# Patient Record
Sex: MALE | Race: WHITE | NOT HISPANIC OR LATINO | Employment: FULL TIME | URBAN - METROPOLITAN AREA
[De-identification: names, ages, dates, MRNs, and addresses within clinical notes are randomized per-mention and may not be internally consistent; named-entity substitution may affect disease eponyms.]

---

## 2017-11-28 ENCOUNTER — HOSPITAL ENCOUNTER (INPATIENT)
Facility: MEDICAL CENTER | Age: 37
LOS: 3 days | DRG: 815 | End: 2017-12-01
Attending: EMERGENCY MEDICINE | Admitting: SURGERY
Payer: COMMERCIAL

## 2017-11-28 ENCOUNTER — RESOLUTE PROFESSIONAL BILLING HOSPITAL PROF FEE (OUTPATIENT)
Dept: HOSPITALIST | Facility: MEDICAL CENTER | Age: 37
End: 2017-11-28
Payer: COMMERCIAL

## 2017-11-28 ENCOUNTER — APPOINTMENT (OUTPATIENT)
Dept: RADIOLOGY | Facility: MEDICAL CENTER | Age: 37
DRG: 815 | End: 2017-11-28
Attending: EMERGENCY MEDICINE
Payer: COMMERCIAL

## 2017-11-28 DIAGNOSIS — D56.3 THALASSEMIA MINOR: ICD-10-CM

## 2017-11-28 DIAGNOSIS — S36.039A LACERATION OF SPLEEN, INITIAL ENCOUNTER: ICD-10-CM

## 2017-11-28 PROBLEM — T14.90XA TRAUMA: Status: ACTIVE | Noted: 2017-11-28

## 2017-11-28 PROBLEM — Z86.2 H/O THALASSEMIA MINOR: Status: ACTIVE | Noted: 2017-11-28

## 2017-11-28 PROBLEM — E87.6 HYPOKALEMIA: Status: ACTIVE | Noted: 2017-11-28

## 2017-11-28 PROBLEM — Z53.09 CONTRAINDICATION TO DEEP VEIN THROMBOSIS (DVT) PROPHYLAXIS: Status: ACTIVE | Noted: 2017-11-28

## 2017-11-28 PROBLEM — R74.8 ABNORMAL LIVER ENZYMES: Status: ACTIVE | Noted: 2017-11-28

## 2017-11-28 LAB
ALBUMIN SERPL BCP-MCNC: 4.4 G/DL (ref 3.2–4.9)
ALBUMIN/GLOB SERPL: 1.6 G/DL
ALP SERPL-CCNC: 53 U/L (ref 30–99)
ALT SERPL-CCNC: 169 U/L (ref 2–50)
ANION GAP SERPL CALC-SCNC: 7 MMOL/L (ref 0–11.9)
APTT PPP: 29.7 SEC (ref 24.7–36)
AST SERPL-CCNC: 135 U/L (ref 12–45)
BASOPHILS # BLD AUTO: 0.5 % (ref 0–1.8)
BASOPHILS # BLD: 0.04 K/UL (ref 0–0.12)
BILIRUB SERPL-MCNC: 3.4 MG/DL (ref 0.1–1.5)
BUN SERPL-MCNC: 14 MG/DL (ref 8–22)
CALCIUM SERPL-MCNC: 9.1 MG/DL (ref 8.5–10.5)
CHLORIDE SERPL-SCNC: 99 MMOL/L (ref 96–112)
CO2 SERPL-SCNC: 27 MMOL/L (ref 20–33)
CREAT SERPL-MCNC: 0.82 MG/DL (ref 0.5–1.4)
EKG IMPRESSION: NORMAL
EOSINOPHIL # BLD AUTO: 0.09 K/UL (ref 0–0.51)
EOSINOPHIL NFR BLD: 1.1 % (ref 0–6.9)
ERYTHROCYTE [DISTWIDTH] IN BLOOD BY AUTOMATED COUNT: 44.4 FL (ref 35.9–50)
GFR SERPL CREATININE-BSD FRML MDRD: >60 ML/MIN/1.73 M 2
GLOBULIN SER CALC-MCNC: 2.7 G/DL (ref 1.9–3.5)
GLUCOSE SERPL-MCNC: 102 MG/DL (ref 65–99)
HCT VFR BLD AUTO: 33.5 % (ref 42–52)
HGB BLD-MCNC: 10.5 G/DL (ref 14–18)
IMM GRANULOCYTES # BLD AUTO: 0.08 K/UL (ref 0–0.11)
IMM GRANULOCYTES NFR BLD AUTO: 1 % (ref 0–0.9)
INR PPP: 1.15 (ref 0.87–1.13)
LYMPHOCYTES # BLD AUTO: 1.78 K/UL (ref 1–4.8)
LYMPHOCYTES NFR BLD: 21.3 % (ref 22–41)
MCH RBC QN AUTO: 22.8 PG (ref 27–33)
MCHC RBC AUTO-ENTMCNC: 31.3 G/DL (ref 33.7–35.3)
MCV RBC AUTO: 72.7 FL (ref 81.4–97.8)
MONOCYTES # BLD AUTO: 0.81 K/UL (ref 0–0.85)
MONOCYTES NFR BLD AUTO: 9.7 % (ref 0–13.4)
NEUTROPHILS # BLD AUTO: 5.54 K/UL (ref 1.82–7.42)
NEUTROPHILS NFR BLD: 66.4 % (ref 44–72)
NRBC # BLD AUTO: 0.08 K/UL
NRBC BLD AUTO-RTO: 1 /100 WBC
PLATELET # BLD AUTO: 181 K/UL (ref 164–446)
POTASSIUM SERPL-SCNC: 3.5 MMOL/L (ref 3.6–5.5)
PROT SERPL-MCNC: 7.1 G/DL (ref 6–8.2)
PROTHROMBIN TIME: 14.4 SEC (ref 12–14.6)
RBC # BLD AUTO: 4.61 M/UL (ref 4.7–6.1)
SODIUM SERPL-SCNC: 133 MMOL/L (ref 135–145)
WBC # BLD AUTO: 8.3 K/UL (ref 4.8–10.8)

## 2017-11-28 PROCEDURE — 85025 COMPLETE CBC W/AUTO DIFF WBC: CPT

## 2017-11-28 PROCEDURE — 700102 HCHG RX REV CODE 250 W/ 637 OVERRIDE(OP): Performed by: NURSE PRACTITIONER

## 2017-11-28 PROCEDURE — 93005 ELECTROCARDIOGRAM TRACING: CPT

## 2017-11-28 PROCEDURE — 85610 PROTHROMBIN TIME: CPT

## 2017-11-28 PROCEDURE — 85730 THROMBOPLASTIN TIME PARTIAL: CPT

## 2017-11-28 PROCEDURE — 700111 HCHG RX REV CODE 636 W/ 250 OVERRIDE (IP): Performed by: EMERGENCY MEDICINE

## 2017-11-28 PROCEDURE — 700101 HCHG RX REV CODE 250: Performed by: NURSE PRACTITIONER

## 2017-11-28 PROCEDURE — 71275 CT ANGIOGRAPHY CHEST: CPT

## 2017-11-28 PROCEDURE — 700105 HCHG RX REV CODE 258: Performed by: NURSE PRACTITIONER

## 2017-11-28 PROCEDURE — 770022 HCHG ROOM/CARE - ICU (200)

## 2017-11-28 PROCEDURE — 93005 ELECTROCARDIOGRAM TRACING: CPT | Performed by: EMERGENCY MEDICINE

## 2017-11-28 PROCEDURE — 74177 CT ABD & PELVIS W/CONTRAST: CPT

## 2017-11-28 PROCEDURE — 99291 CRITICAL CARE FIRST HOUR: CPT

## 2017-11-28 PROCEDURE — 80053 COMPREHEN METABOLIC PANEL: CPT

## 2017-11-28 PROCEDURE — 700117 HCHG RX CONTRAST REV CODE 255: Performed by: EMERGENCY MEDICINE

## 2017-11-28 PROCEDURE — A9270 NON-COVERED ITEM OR SERVICE: HCPCS | Performed by: NURSE PRACTITIONER

## 2017-11-28 PROCEDURE — 36415 COLL VENOUS BLD VENIPUNCTURE: CPT

## 2017-11-28 RX ORDER — BACITRACIN ZINC AND POLYMYXIN B SULFATE 500; 1000 [USP'U]/G; [USP'U]/G
OINTMENT TOPICAL 3 TIMES DAILY
Status: DISCONTINUED | OUTPATIENT
Start: 2017-11-28 | End: 2017-12-01 | Stop reason: HOSPADM

## 2017-11-28 RX ORDER — BISACODYL 10 MG
10 SUPPOSITORY, RECTAL RECTAL
Status: DISCONTINUED | OUTPATIENT
Start: 2017-11-28 | End: 2017-12-01 | Stop reason: HOSPADM

## 2017-11-28 RX ORDER — ONDANSETRON 4 MG/1
4 TABLET, ORALLY DISINTEGRATING ORAL ONCE
Status: DISCONTINUED | OUTPATIENT
Start: 2017-11-28 | End: 2017-11-28

## 2017-11-28 RX ORDER — OXYCODONE HYDROCHLORIDE 10 MG/1
10 TABLET ORAL
Status: DISCONTINUED | OUTPATIENT
Start: 2017-11-28 | End: 2017-12-01 | Stop reason: HOSPADM

## 2017-11-28 RX ORDER — POLYETHYLENE GLYCOL 3350 17 G/17G
1 POWDER, FOR SOLUTION ORAL 2 TIMES DAILY
Status: DISCONTINUED | OUTPATIENT
Start: 2017-11-28 | End: 2017-12-01 | Stop reason: HOSPADM

## 2017-11-28 RX ORDER — CHLORHEXIDINE GLUCONATE ORAL RINSE 1.2 MG/ML
15 SOLUTION DENTAL EVERY 12 HOURS
Status: DISCONTINUED | OUTPATIENT
Start: 2017-11-28 | End: 2017-11-29

## 2017-11-28 RX ORDER — FELODIPINE 5 MG/1
5 TABLET, EXTENDED RELEASE ORAL DAILY
COMMUNITY
End: 2019-03-23

## 2017-11-28 RX ORDER — FAMOTIDINE 20 MG/1
20 TABLET, FILM COATED ORAL 2 TIMES DAILY
Status: DISCONTINUED | OUTPATIENT
Start: 2017-11-28 | End: 2017-11-30

## 2017-11-28 RX ORDER — ONDANSETRON 2 MG/ML
4 INJECTION INTRAMUSCULAR; INTRAVENOUS EVERY 4 HOURS PRN
Status: DISCONTINUED | OUTPATIENT
Start: 2017-11-28 | End: 2017-12-01 | Stop reason: HOSPADM

## 2017-11-28 RX ORDER — ENEMA 19; 7 G/133ML; G/133ML
1 ENEMA RECTAL
Status: DISCONTINUED | OUTPATIENT
Start: 2017-11-28 | End: 2017-12-01 | Stop reason: HOSPADM

## 2017-11-28 RX ORDER — MORPHINE SULFATE 4 MG/ML
2 INJECTION, SOLUTION INTRAMUSCULAR; INTRAVENOUS
Status: DISCONTINUED | OUTPATIENT
Start: 2017-11-28 | End: 2017-12-01 | Stop reason: HOSPADM

## 2017-11-28 RX ORDER — MORPHINE SULFATE 4 MG/ML
4 INJECTION, SOLUTION INTRAMUSCULAR; INTRAVENOUS ONCE
Status: COMPLETED | OUTPATIENT
Start: 2017-11-28 | End: 2017-11-28

## 2017-11-28 RX ORDER — AMOXICILLIN 250 MG
1 CAPSULE ORAL
Status: DISCONTINUED | OUTPATIENT
Start: 2017-11-28 | End: 2017-12-01 | Stop reason: HOSPADM

## 2017-11-28 RX ORDER — OXYCODONE HYDROCHLORIDE 5 MG/1
5 TABLET ORAL
Status: DISCONTINUED | OUTPATIENT
Start: 2017-11-28 | End: 2017-12-01 | Stop reason: HOSPADM

## 2017-11-28 RX ORDER — DOCUSATE SODIUM 100 MG/1
100 CAPSULE, LIQUID FILLED ORAL 2 TIMES DAILY
Status: DISCONTINUED | OUTPATIENT
Start: 2017-11-28 | End: 2017-12-01 | Stop reason: HOSPADM

## 2017-11-28 RX ORDER — SODIUM CHLORIDE, SODIUM LACTATE, POTASSIUM CHLORIDE, CALCIUM CHLORIDE 600; 310; 30; 20 MG/100ML; MG/100ML; MG/100ML; MG/100ML
INJECTION, SOLUTION INTRAVENOUS CONTINUOUS
Status: DISCONTINUED | OUTPATIENT
Start: 2017-11-28 | End: 2017-11-29

## 2017-11-28 RX ORDER — ACETAMINOPHEN 500 MG
1000 TABLET ORAL EVERY 6 HOURS
Status: DISCONTINUED | OUTPATIENT
Start: 2017-11-29 | End: 2017-12-01 | Stop reason: HOSPADM

## 2017-11-28 RX ORDER — AMOXICILLIN 250 MG
1 CAPSULE ORAL NIGHTLY
Status: DISCONTINUED | OUTPATIENT
Start: 2017-11-28 | End: 2017-12-01 | Stop reason: HOSPADM

## 2017-11-28 RX ORDER — LOSARTAN POTASSIUM 100 MG/1
100 TABLET ORAL DAILY
COMMUNITY
End: 2019-03-23

## 2017-11-28 RX ORDER — POTASSIUM CHLORIDE 20 MEQ/1
20 TABLET, EXTENDED RELEASE ORAL DAILY
Status: COMPLETED | OUTPATIENT
Start: 2017-11-29 | End: 2017-11-30

## 2017-11-28 RX ADMIN — ACETAMINOPHEN 1000 MG: 500 TABLET ORAL at 23:39

## 2017-11-28 RX ADMIN — MORPHINE SULFATE 4 MG: 4 INJECTION INTRAVENOUS at 23:40

## 2017-11-28 RX ADMIN — BACITRACIN ZINC AND POLYMYXIN B SULFATE 1 EACH: 500; 10000 OINTMENT TOPICAL at 23:39

## 2017-11-28 RX ADMIN — OXYCODONE HYDROCHLORIDE 5 MG: 5 TABLET ORAL at 22:12

## 2017-11-28 RX ADMIN — IOHEXOL 100 ML: 350 INJECTION, SOLUTION INTRAVENOUS at 20:41

## 2017-11-28 RX ADMIN — SODIUM CHLORIDE, POTASSIUM CHLORIDE, SODIUM LACTATE AND CALCIUM CHLORIDE: 600; 310; 30; 20 INJECTION, SOLUTION INTRAVENOUS at 22:15

## 2017-11-28 RX ADMIN — FAMOTIDINE 20 MG: 20 TABLET, FILM COATED ORAL at 23:39

## 2017-11-28 ASSESSMENT — PAIN SCALES - GENERAL
PAINLEVEL_OUTOF10: 3
PAINLEVEL_OUTOF10: 4
PAINLEVEL_OUTOF10: 7

## 2017-11-28 ASSESSMENT — LIFESTYLE VARIABLES
HOW MANY TIMES IN THE PAST YEAR HAVE YOU HAD 5 OR MORE DRINKS IN A DAY: 1
EVER HAD A DRINK FIRST THING IN THE MORNING TO STEADY YOUR NERVES TO GET RID OF A HANGOVER: NO
AVERAGE NUMBER OF DAYS PER WEEK YOU HAVE A DRINK CONTAINING ALCOHOL: 12
TOTAL SCORE: 1
CONSUMPTION TOTAL: POSITIVE
TOTAL SCORE: 1
HAVE YOU EVER FELT YOU SHOULD CUT DOWN ON YOUR DRINKING: YES
ALCOHOL_USE: YES
EVER FELT BAD OR GUILTY ABOUT YOUR DRINKING: NO
ON A TYPICAL DAY WHEN YOU DRINK ALCOHOL HOW MANY DRINKS DO YOU HAVE: 2
TOTAL SCORE: 1
DO YOU DRINK ALCOHOL: NO
HAVE PEOPLE ANNOYED YOU BY CRITICIZING YOUR DRINKING: NO
EVER_SMOKED: YES

## 2017-11-28 ASSESSMENT — ENCOUNTER SYMPTOMS
NAUSEA: 1
VOMITING: 0

## 2017-11-28 ASSESSMENT — PATIENT HEALTH QUESTIONNAIRE - PHQ9
1. LITTLE INTEREST OR PLEASURE IN DOING THINGS: NOT AT ALL
SUM OF ALL RESPONSES TO PHQ QUESTIONS 1-9: 0
SUM OF ALL RESPONSES TO PHQ9 QUESTIONS 1 AND 2: 0

## 2017-11-28 NOTE — LETTER
[facility]   [address], [place] [address]  Phone: Dept: [phone] - Fax:        [facility] Progress Report and Disability Certification  Date of Service: 11/28/2017   No Show:  No  Date / Time of Next Visit:     Claim Information   Patient Name: River Olson  Claim Number:     Employer: [employer] Date of Injury: 11/21/2017     Insurer / TPA: [employer] ID / SSN:    Occupation:  Diagnosis: Diagnoses of Laceration of spleen, initial encounter and Thalassemia minor were pertinent to this visit.    Medical Information   Related to Industrial Injury? Yes    Subjective Complaints:  Patient presents complaining of left lower chest pain, upper abdominal pain from a fall on Tuesday off of a train. He stated the pain gets significantly worse on Saturday. Patient went to an occupational care where they're concerned about a pulmonary embolism sent the patient to the emergency department for evaluation.   Objective Findings: Patient is tender about the left upper quadrant region lower chest area.   Pre-Existing Condition(s): None   Assessment:   Initial Visit    Status: Additional Care Required  Permanent Disability:No    Plan:   Comments:patient will be admitted to the ICU for observation of care and possible treatment is necessary    Diagnostics: CT    Comments:  Patient has a splenic laceration secondary to the fall occurred on Tuesday.    Disability Information   Status:   Comments:unable to determine at this time    From:     Through:   Restrictions are:     Physical Restrictions   Sitting:    Standing:    Stooping:    Bending:      Squatting:    Walking:    Climbing:    Pushing:      Pulling:    Other:    Reaching Above Shoulder (L):   Reaching Above Shoulder (R):       Reaching Below Shoulder (L):    Reaching Below Shoulder (R):      Not to exceed Weight Limits   Carrying(hrs):   Weight Limit(lb):   Lifting(hrs):   Weight  Limit(lb):     Comments:         Repetitive Actions   Hands: i.e. Fine Manipulations from Grasping:     Feet: i.e. Operating Foot Controls:     Driving / Operate Machinery:     Physician Name: Tyson Hernandez Physician Signature: TYSON Nunes M.D. e-Signature:  , Medical Director   Clinic Name / Location: [address] [address]  [phone]     Clinic Phone Number: Dept: [phone]   Appointment Time:  Visit Start Time:    Check-In Time:  5:47 PM Visit Discharge Time:    Original-Treating Physician or Chiropractor    Page 2-Insurer/TPA    Page 3-Employer    Page 4-Employee

## 2017-11-29 PROBLEM — D62 ANEMIA ASSOCIATED WITH ACUTE BLOOD LOSS: Status: ACTIVE | Noted: 2017-11-29

## 2017-11-29 LAB
ALBUMIN SERPL BCP-MCNC: 3.8 G/DL (ref 3.2–4.9)
ALBUMIN/GLOB SERPL: 1.4 G/DL
ALP SERPL-CCNC: 56 U/L (ref 30–99)
ALT SERPL-CCNC: 140 U/L (ref 2–50)
ANION GAP SERPL CALC-SCNC: 7 MMOL/L (ref 0–11.9)
AST SERPL-CCNC: 111 U/L (ref 12–45)
BASOPHILS # BLD AUTO: 0.3 % (ref 0–1.8)
BASOPHILS # BLD AUTO: 0.5 % (ref 0–1.8)
BASOPHILS # BLD AUTO: 0.7 % (ref 0–1.8)
BASOPHILS # BLD AUTO: 0.7 % (ref 0–1.8)
BASOPHILS # BLD: 0.02 K/UL (ref 0–0.12)
BASOPHILS # BLD: 0.03 K/UL (ref 0–0.12)
BASOPHILS # BLD: 0.04 K/UL (ref 0–0.12)
BASOPHILS # BLD: 0.05 K/UL (ref 0–0.12)
BILIRUB SERPL-MCNC: 4.6 MG/DL (ref 0.1–1.5)
BUN SERPL-MCNC: 10 MG/DL (ref 8–22)
CALCIUM SERPL-MCNC: 8.9 MG/DL (ref 8.5–10.5)
CHLORIDE SERPL-SCNC: 103 MMOL/L (ref 96–112)
CO2 SERPL-SCNC: 27 MMOL/L (ref 20–33)
CREAT SERPL-MCNC: 0.63 MG/DL (ref 0.5–1.4)
EOSINOPHIL # BLD AUTO: 0.08 K/UL (ref 0–0.51)
EOSINOPHIL # BLD AUTO: 0.1 K/UL (ref 0–0.51)
EOSINOPHIL # BLD AUTO: 0.11 K/UL (ref 0–0.51)
EOSINOPHIL # BLD AUTO: 0.12 K/UL (ref 0–0.51)
EOSINOPHIL NFR BLD: 1.1 % (ref 0–6.9)
EOSINOPHIL NFR BLD: 1.7 % (ref 0–6.9)
EOSINOPHIL NFR BLD: 1.9 % (ref 0–6.9)
EOSINOPHIL NFR BLD: 2.1 % (ref 0–6.9)
ERYTHROCYTE [DISTWIDTH] IN BLOOD BY AUTOMATED COUNT: 43.6 FL (ref 35.9–50)
ERYTHROCYTE [DISTWIDTH] IN BLOOD BY AUTOMATED COUNT: 44.7 FL (ref 35.9–50)
ERYTHROCYTE [DISTWIDTH] IN BLOOD BY AUTOMATED COUNT: 45 FL (ref 35.9–50)
ERYTHROCYTE [DISTWIDTH] IN BLOOD BY AUTOMATED COUNT: 45.2 FL (ref 35.9–50)
GFR SERPL CREATININE-BSD FRML MDRD: >60 ML/MIN/1.73 M 2
GLOBULIN SER CALC-MCNC: 2.8 G/DL (ref 1.9–3.5)
GLUCOSE BLD-MCNC: 141 MG/DL (ref 65–99)
GLUCOSE SERPL-MCNC: 101 MG/DL (ref 65–99)
HCT VFR BLD AUTO: 30.3 % (ref 42–52)
HCT VFR BLD AUTO: 30.7 % (ref 42–52)
HCT VFR BLD AUTO: 30.7 % (ref 42–52)
HCT VFR BLD AUTO: 31.8 % (ref 42–52)
HGB BLD-MCNC: 9.5 G/DL (ref 14–18)
HGB BLD-MCNC: 9.6 G/DL (ref 14–18)
HGB BLD-MCNC: 9.6 G/DL (ref 14–18)
HGB BLD-MCNC: 9.9 G/DL (ref 14–18)
IMM GRANULOCYTES # BLD AUTO: 0.04 K/UL (ref 0–0.11)
IMM GRANULOCYTES # BLD AUTO: 0.05 K/UL (ref 0–0.11)
IMM GRANULOCYTES # BLD AUTO: 0.05 K/UL (ref 0–0.11)
IMM GRANULOCYTES # BLD AUTO: 0.07 K/UL (ref 0–0.11)
IMM GRANULOCYTES NFR BLD AUTO: 0.7 % (ref 0–0.9)
IMM GRANULOCYTES NFR BLD AUTO: 0.9 % (ref 0–0.9)
LYMPHOCYTES # BLD AUTO: 1.34 K/UL (ref 1–4.8)
LYMPHOCYTES # BLD AUTO: 1.47 K/UL (ref 1–4.8)
LYMPHOCYTES # BLD AUTO: 1.7 K/UL (ref 1–4.8)
LYMPHOCYTES # BLD AUTO: 1.77 K/UL (ref 1–4.8)
LYMPHOCYTES NFR BLD: 22.9 % (ref 22–41)
LYMPHOCYTES NFR BLD: 23.8 % (ref 22–41)
LYMPHOCYTES NFR BLD: 25.7 % (ref 22–41)
LYMPHOCYTES NFR BLD: 29.5 % (ref 22–41)
MCH RBC QN AUTO: 22.4 PG (ref 27–33)
MCH RBC QN AUTO: 22.7 PG (ref 27–33)
MCH RBC QN AUTO: 22.7 PG (ref 27–33)
MCH RBC QN AUTO: 22.8 PG (ref 27–33)
MCHC RBC AUTO-ENTMCNC: 31.1 G/DL (ref 33.7–35.3)
MCHC RBC AUTO-ENTMCNC: 31.3 G/DL (ref 33.7–35.3)
MCHC RBC AUTO-ENTMCNC: 31.3 G/DL (ref 33.7–35.3)
MCHC RBC AUTO-ENTMCNC: 31.4 G/DL (ref 33.7–35.3)
MCV RBC AUTO: 71.5 FL (ref 81.4–97.8)
MCV RBC AUTO: 72.7 FL (ref 81.4–97.8)
MCV RBC AUTO: 72.8 FL (ref 81.4–97.8)
MCV RBC AUTO: 72.9 FL (ref 81.4–97.8)
MONOCYTES # BLD AUTO: 0.46 K/UL (ref 0–0.85)
MONOCYTES # BLD AUTO: 0.47 K/UL (ref 0–0.85)
MONOCYTES # BLD AUTO: 0.47 K/UL (ref 0–0.85)
MONOCYTES # BLD AUTO: 0.62 K/UL (ref 0–0.85)
MONOCYTES NFR BLD AUTO: 7.8 % (ref 0–13.4)
MONOCYTES NFR BLD AUTO: 8.2 % (ref 0–13.4)
MONOCYTES NFR BLD AUTO: 8.2 % (ref 0–13.4)
MONOCYTES NFR BLD AUTO: 8.3 % (ref 0–13.4)
NEUTROPHILS # BLD AUTO: 3.41 K/UL (ref 1.82–7.42)
NEUTROPHILS # BLD AUTO: 3.58 K/UL (ref 1.82–7.42)
NEUTROPHILS # BLD AUTO: 3.88 K/UL (ref 1.82–7.42)
NEUTROPHILS # BLD AUTO: 4.84 K/UL (ref 1.82–7.42)
NEUTROPHILS NFR BLD: 59.2 % (ref 44–72)
NEUTROPHILS NFR BLD: 62.6 % (ref 44–72)
NEUTROPHILS NFR BLD: 65.2 % (ref 44–72)
NEUTROPHILS NFR BLD: 66.2 % (ref 44–72)
NRBC # BLD AUTO: 0.05 K/UL
NRBC # BLD AUTO: 0.06 K/UL
NRBC # BLD AUTO: 0.08 K/UL
NRBC # BLD AUTO: 0.08 K/UL
NRBC BLD AUTO-RTO: 0.9 /100 WBC
NRBC BLD AUTO-RTO: 1 /100 WBC
NRBC BLD AUTO-RTO: 1.1 /100 WBC
NRBC BLD AUTO-RTO: 1.4 /100 WBC
PLATELET # BLD AUTO: 119 K/UL (ref 164–446)
PLATELET # BLD AUTO: 141 K/UL (ref 164–446)
PLATELET # BLD AUTO: 170 K/UL (ref 164–446)
PLATELET # BLD AUTO: 170 K/UL (ref 164–446)
POTASSIUM SERPL-SCNC: 4.1 MMOL/L (ref 3.6–5.5)
PROT SERPL-MCNC: 6.6 G/DL (ref 6–8.2)
RBC # BLD AUTO: 4.21 M/UL (ref 4.7–6.1)
RBC # BLD AUTO: 4.22 M/UL (ref 4.7–6.1)
RBC # BLD AUTO: 4.24 M/UL (ref 4.7–6.1)
RBC # BLD AUTO: 4.37 M/UL (ref 4.7–6.1)
SODIUM SERPL-SCNC: 137 MMOL/L (ref 135–145)
WBC # BLD AUTO: 5.7 K/UL (ref 4.8–10.8)
WBC # BLD AUTO: 5.8 K/UL (ref 4.8–10.8)
WBC # BLD AUTO: 5.9 K/UL (ref 4.8–10.8)
WBC # BLD AUTO: 7.4 K/UL (ref 4.8–10.8)

## 2017-11-29 PROCEDURE — 770006 HCHG ROOM/CARE - MED/SURG/GYN SEMI*

## 2017-11-29 PROCEDURE — 700111 HCHG RX REV CODE 636 W/ 250 OVERRIDE (IP): Performed by: SURGERY

## 2017-11-29 PROCEDURE — A9270 NON-COVERED ITEM OR SERVICE: HCPCS | Performed by: NURSE PRACTITIONER

## 2017-11-29 PROCEDURE — 700102 HCHG RX REV CODE 250 W/ 637 OVERRIDE(OP): Performed by: NURSE PRACTITIONER

## 2017-11-29 PROCEDURE — 90471 IMMUNIZATION ADMIN: CPT

## 2017-11-29 PROCEDURE — 700112 HCHG RX REV CODE 229: Performed by: NURSE PRACTITIONER

## 2017-11-29 PROCEDURE — 3E0234Z INTRODUCTION OF SERUM, TOXOID AND VACCINE INTO MUSCLE, PERCUTANEOUS APPROACH: ICD-10-PCS | Performed by: SURGERY

## 2017-11-29 PROCEDURE — 80053 COMPREHEN METABOLIC PANEL: CPT

## 2017-11-29 PROCEDURE — 82962 GLUCOSE BLOOD TEST: CPT

## 2017-11-29 PROCEDURE — 700105 HCHG RX REV CODE 258: Performed by: NURSE PRACTITIONER

## 2017-11-29 PROCEDURE — 90732 PPSV23 VACC 2 YRS+ SUBQ/IM: CPT | Performed by: SURGERY

## 2017-11-29 PROCEDURE — 85025 COMPLETE CBC W/AUTO DIFF WBC: CPT | Mod: 91

## 2017-11-29 PROCEDURE — 99233 SBSQ HOSP IP/OBS HIGH 50: CPT | Performed by: SURGERY

## 2017-11-29 PROCEDURE — 94667 MNPJ CHEST WALL 1ST: CPT

## 2017-11-29 PROCEDURE — 700101 HCHG RX REV CODE 250: Performed by: NURSE PRACTITIONER

## 2017-11-29 RX ORDER — ALBUTEROL SULFATE 90 UG/1
2 AEROSOL, METERED RESPIRATORY (INHALATION) EVERY 4 HOURS PRN
Status: DISCONTINUED | OUTPATIENT
Start: 2017-11-29 | End: 2017-12-01 | Stop reason: HOSPADM

## 2017-11-29 RX ADMIN — STANDARDIZED SENNA CONCENTRATE AND DOCUSATE SODIUM 1 TABLET: 8.6; 5 TABLET, FILM COATED ORAL at 19:54

## 2017-11-29 RX ADMIN — PNEUMOCOCCAL VACCINE POLYVALENT 25 MCG
25; 25; 25; 25; 25; 25; 25; 25; 25; 25; 25; 25; 25; 25; 25; 25; 25; 25; 25; 25; 25; 25; 25 INJECTION, SOLUTION INTRAMUSCULAR; SUBCUTANEOUS at 17:26

## 2017-11-29 RX ADMIN — ACETAMINOPHEN 1000 MG: 500 TABLET ORAL at 12:20

## 2017-11-29 RX ADMIN — FAMOTIDINE 20 MG: 20 TABLET, FILM COATED ORAL at 19:54

## 2017-11-29 RX ADMIN — DOCUSATE SODIUM 100 MG: 100 CAPSULE ORAL at 19:54

## 2017-11-29 RX ADMIN — BACITRACIN ZINC AND POLYMYXIN B SULFATE: 500; 10000 OINTMENT TOPICAL at 09:34

## 2017-11-29 RX ADMIN — OXYCODONE HYDROCHLORIDE 10 MG: 10 TABLET ORAL at 16:23

## 2017-11-29 RX ADMIN — POTASSIUM CHLORIDE 20 MEQ: 1500 TABLET, EXTENDED RELEASE ORAL at 09:33

## 2017-11-29 RX ADMIN — ACETAMINOPHEN 1000 MG: 500 TABLET ORAL at 05:58

## 2017-11-29 RX ADMIN — BACITRACIN ZINC AND POLYMYXIN B SULFATE: 500; 10000 OINTMENT TOPICAL at 15:00

## 2017-11-29 RX ADMIN — BACITRACIN ZINC AND POLYMYXIN B SULFATE 1 EACH: 500; 10000 OINTMENT TOPICAL at 21:00

## 2017-11-29 RX ADMIN — OXYCODONE HYDROCHLORIDE 10 MG: 10 TABLET ORAL at 05:59

## 2017-11-29 RX ADMIN — SODIUM CHLORIDE, POTASSIUM CHLORIDE, SODIUM LACTATE AND CALCIUM CHLORIDE: 600; 310; 30; 20 INJECTION, SOLUTION INTRAVENOUS at 09:35

## 2017-11-29 RX ADMIN — ACETAMINOPHEN 1000 MG: 500 TABLET ORAL at 18:09

## 2017-11-29 RX ADMIN — FAMOTIDINE 20 MG: 20 TABLET, FILM COATED ORAL at 09:33

## 2017-11-29 RX ADMIN — OXYCODONE HYDROCHLORIDE 10 MG: 10 TABLET ORAL at 19:54

## 2017-11-29 ASSESSMENT — PAIN SCALES - GENERAL
PAINLEVEL_OUTOF10: 5
PAINLEVEL_OUTOF10: 4
PAINLEVEL_OUTOF10: 3
PAINLEVEL_OUTOF10: 7
PAINLEVEL_OUTOF10: 3
PAINLEVEL_OUTOF10: 1
PAINLEVEL_OUTOF10: 1
PAINLEVEL_OUTOF10: 2
PAINLEVEL_OUTOF10: 1
PAINLEVEL_OUTOF10: 5
PAINLEVEL_OUTOF10: 3
PAINLEVEL_OUTOF10: 6
PAINLEVEL_OUTOF10: 1
PAINLEVEL_OUTOF10: 3
PAINLEVEL_OUTOF10: 7

## 2017-11-29 ASSESSMENT — COPD QUESTIONNAIRES
HAVE YOU SMOKED AT LEAST 100 CIGARETTES IN YOUR ENTIRE LIFE: YES
COPD SCREENING SCORE: 4
DO YOU EVER COUGH UP ANY MUCUS OR PHLEGM?: YES, EVERY DAY
DURING THE PAST 4 WEEKS HOW MUCH DID YOU FEEL SHORT OF BREATH: NONE/LITTLE OF THE TIME

## 2017-11-29 ASSESSMENT — ENCOUNTER SYMPTOMS
FEVER: 0
DIARRHEA: 0
CHILLS: 0
ROS GI COMMENTS: + FLATUS
SHORTNESS OF BREATH: 0
ABDOMINAL PAIN: 1
DIZZINESS: 0
NAUSEA: 0
VOMITING: 0

## 2017-11-29 ASSESSMENT — LIFESTYLE VARIABLES: EVER_SMOKED: YES

## 2017-11-29 NOTE — ED NOTES
River Olson  37 y.o.  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

## 2017-11-29 NOTE — CARE PLAN
Problem: Respiratory:  Goal: Respiratory status will improve    Intervention: Assess and monitor pulmonary status   11/29/17 0347 11/29/17 0400 11/29/17 0700   Vitals   Respiration --  --  13   Pulse Oximetry --  --  98 %   OTHER   O2 (LPM) 0 --  --    Pre/Post Intervention Post Intervention Assessment --  --    RUL Breath Sounds --  Clear --    RML Breath Sounds --  Clear --    RLL Breath Sounds --  Clear --    NEWTON Breath Sounds --  Clear --    LLL Breath Sounds --  Clear --      Intervention: Administer and titrate oxygen therapy  Patient on RA and tolerating well.

## 2017-11-29 NOTE — CARE PLAN
Problem: Safety  Goal: Will remain free from injury    Intervention: Provide assistance with mobility  Call bell with in reach. Pt encouraged to call for help.      Problem: Pain Management  Goal: Pain level will decrease to patient's comfort goal    Intervention: Follow pain managment plan developed in collaboration with patient and Interdisciplinary Team  Pain assessed Q 2 hours

## 2017-11-29 NOTE — ED PROVIDER NOTES
ED Provider Note    Scribed for Tyson Hernandez M.D. by Jesus Manuel Hdz. 11/28/2017, 6:23 PM.    Primary care provider: Jose Tidwell M.D.  Means of arrival: walk in  History obtained from: patient  History limited by: none    CHIEF COMPLAINT  Chief Complaint   Patient presents with   • Sent by MD     Pt went to follow up at the workman's comp office today for increasing pain, states that he fell off a moving [employer] train last week on tuesday, had blood work and CXR done today, was told to come straight to the ER for elevated d-dimer 283 and concern for blood clot in the lung       HPI  River Olson is a 37 y.o. male who presents to the Emergency Department as a referral from his primary for evaluation of elevated D-dimer and complaining of left sided chest wall pain status post fall from train 7 days ago. Patient describes his pain as 9/10 severity that is worse at night. Patient states that his condition began post fall off an [employer] train when he impacted his left side. He reports that his pain out as 4/10 severity that suddenly increased to 9/10 severity today. He reports associated nausea, difficulty breathing. Today he was evaluated by occupational health and referred to the ED for elevated D Dimer and possible pulmonary embolism. Patient denies shortness of breath, vomiting.    REVIEW OF SYSTEMS  Review of Systems   Gastrointestinal: Positive for nausea. Negative for vomiting.   Musculoskeletal:        Positive chest wall pain.    All other systems reviewed and are negative.  C.    PAST MEDICAL HISTORY   has a past medical history of ASTHMA.    SURGICAL HISTORY  patient denies any surgical history    SOCIAL HISTORY  Social History   Substance Use Topics   • Smoking status: Current Some Day Smoker   • Smokeless tobacco: None noted      Comment: quit 4/2011   • Alcohol use None noted        FAMILY HISTORY  None noted    CURRENT MEDICATIONS  No current facility-administered medications for this  "encounter.     Current Outpatient Prescriptions:   •  LORAZEPAM PO, Take 100 mg by mouth., Disp: , Rfl:   •  hydrochlorothiazide (HYDRODIURIL) 25 MG TABS, Take 25 mg by mouth every day., Disp: , Rfl:   •  indomethacin (INDOCIN) 50 MG CAPS, Take 1 Cap by mouth 3 times a day as needed., Disp: 30 Cap, Rfl: 1  •  hydrocodone-acetaminophen (NORCO) 5-325 MG TABS per tablet, Take 1-2 Tabs by mouth every 6 hours as needed., Disp: 20 Tab, Rfl: 0    ALLERGIES  No Known Allergies    PHYSICAL EXAM  VITAL SIGNS: BP (!) 164/96   Pulse (!) 125   Temp 37.1 °C (98.8 °F)   Resp 16   Ht 1.803 m (5' 11\")   Wt 116.6 kg (257 lb 0.9 oz)   SpO2 96%   BMI 35.85 kg/m²     Constitutional: Well developed, Well nourished, No acute distress, Non-toxic appearance.   HENT: Normocephalic, Atraumatic, Bilateral external ears normal, oropharynx moist, No oral exudates, Nose normal.   Eyes: Pupils are equal round and react to light, extraocular motions are intact, conjunctiva is normal, there are no signs of exudate.   Neck: Supple, no meningeal signs.  Lymphatic: No lymphadenopathy noted.   Cardiovascular: Regular rate and rhythm without murmurs gallops or rubs.   Thorax & Lungs: No respiratory distress. Breathing comfortably. Lungs are clear to auscultation bilaterally, there are no wheezes no rales. Left anterior thoracic tenderness  Abdomen: Soft, Mild left upper quadrant tenderness. nondistended. Bowel sounds are present.   Skin: Warm, Dry, No erythema,   Back: No tenderness, No CVA tenderness.  Musculoskeletal: Good range of motion in all major joints. No tenderness to palpation or major deformities noted. Intact distal pulses, no clubbing, no cyanosis, no edema, Negative homans sign  Neurologic: Alert & oriented x 3, Moving all extremities. No gross abnormalities.    Psychiatric: Affect normal, Judgment normal, Mood normal.     LABS  Results for orders placed or performed during the hospital encounter of 11/28/17   CBC w/ Differential "   Result Value Ref Range    WBC 8.3 4.8 - 10.8 K/uL    RBC 4.61 (L) 4.70 - 6.10 M/uL    Hemoglobin 10.5 (L) 14.0 - 18.0 g/dL    Hematocrit 33.5 (L) 42.0 - 52.0 %    MCV 72.7 (L) 81.4 - 97.8 fL    MCH 22.8 (L) 27.0 - 33.0 pg    MCHC 31.3 (L) 33.7 - 35.3 g/dL    RDW 44.4 35.9 - 50.0 fL    Platelet Count 181 164 - 446 K/uL    Neutrophils-Polys 66.40 44.00 - 72.00 %    Lymphocytes 21.30 (L) 22.00 - 41.00 %    Monocytes 9.70 0.00 - 13.40 %    Eosinophils 1.10 0.00 - 6.90 %    Basophils 0.50 0.00 - 1.80 %    Immature Granulocytes 1.00 (H) 0.00 - 0.90 %    Nucleated RBC 1.00 /100 WBC    Neutrophils (Absolute) 5.54 1.82 - 7.42 K/uL    Lymphs (Absolute) 1.78 1.00 - 4.80 K/uL    Monos (Absolute) 0.81 0.00 - 0.85 K/uL    Eos (Absolute) 0.09 0.00 - 0.51 K/uL    Baso (Absolute) 0.04 0.00 - 0.12 K/uL    Immature Granulocytes (abs) 0.08 0.00 - 0.11 K/uL    NRBC (Absolute) 0.08 K/uL   Complete Metabolic Panel (CMP)   Result Value Ref Range    Sodium 133 (L) 135 - 145 mmol/L    Potassium 3.5 (L) 3.6 - 5.5 mmol/L    Chloride 99 96 - 112 mmol/L    Co2 27 20 - 33 mmol/L    Anion Gap 7.0 0.0 - 11.9    Glucose 102 (H) 65 - 99 mg/dL    Bun 14 8 - 22 mg/dL    Creatinine 0.82 0.50 - 1.40 mg/dL    Calcium 9.1 8.5 - 10.5 mg/dL    AST(SGOT) 135 (H) 12 - 45 U/L    ALT(SGPT) 169 (H) 2 - 50 U/L    Alkaline Phosphatase 53 30 - 99 U/L    Total Bilirubin 3.4 (H) 0.1 - 1.5 mg/dL    Albumin 4.4 3.2 - 4.9 g/dL    Total Protein 7.1 6.0 - 8.2 g/dL    Globulin 2.7 1.9 - 3.5 g/dL    A-G Ratio 1.6 g/dL   PT/INR   Result Value Ref Range    PT 14.4 12.0 - 14.6 sec    INR 1.15 (H) 0.87 - 1.13   APTT   Result Value Ref Range    APTT 29.7 24.7 - 36.0 sec   ESTIMATED GFR   Result Value Ref Range    GFR If African American >60 >60 mL/min/1.73 m 2    GFR If Non African American >60 >60 mL/min/1.73 m 2   EKG (ER)   Result Value Ref Range    Report       [facility] Emergency Dept.    Test Date:  2017-11-28  Pt Name:    AMANDA PERAZA                  Department: ER  MRN:        [id number]                      Room:  Gender:     HALLEY                            Technician: [id number]  :        1980                   Requested By:ER TRIAGE PROTOCOL  Order #:    [id number]                    Reading MD:    Measurements  Intervals                                Axis  Rate:       113                          P:          53  PA:         128                          QRS:        24  QRSD:       114                          T:          2  QT:         356  QTc:        489    Interpretive Statements  SINUS TACHYCARDIA  INCOMPLETE RIGHT BUNDLE BRANCH BLOCK  No previous ECG available for comparison     All labs reviewed by me.    EKG  Interpreted by me as above.    RADIOLOGY  CT-CTA CHEST PULMONARY ARTERY W/ RECONS   Final Result      No CT evidence of pulmonary embolism. 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      Findings were discussed with HOLDEN MONTE on 2017 9:24 PM.      CT-ABDOMEN-PELVIS WITH   Final Result      Small perisplenic hematoma is likely secondary to a posterior superior grade 1 or 2 splenic laceration. Small hemoperitoneum layers in the pelvis      Worsening moderate splenomegaly      No CT evidence of mesenteric injury      Severe for age colonic diverticulosis      Cholecystectomy clips      Findings were discussed with HOLDEN MONTE on 2017 9:16 PM.      The radiologist's interpretation of all radiological studies have been reviewed by me.    COURSE & MEDICAL DECISION MAKING  Pertinent Labs & Imaging studies reviewed. (See chart for details)    6:23 PM - Patient seen and examined at bedside. I informed the patient that a simple bruise will elevate his D dimer. I discussed evaluation with radiology to evaluate any possible blunt trauma to his chest. I do not suspect his condition includes blood clots. He will be evaluated in the ED and if negative will be discharged with care instructions. Patient  decline pain medication at this time. Ordered CT CTA chest pulmonary artery, CBC with differential, CMP, PT/INR, APTT to evaluate his symptoms. The differential diagnoses include but are not limited to: pulmonary contusion, rib fracture vs contusion, splenic rupture    Decision Making:  He presents for evaluation. Clinically am not concerned about a pulmonary embolism. However, he was sent here for that evaluation. I'm more concerned about the possibility of a splenic laceration. CT scans were done. The above fashion. At this point he does appear that he has a grade 1/2 splenic laceration with some hemoperitoneum. 3. Possible patient may have suffered a delayed rupture on Saturday when he started having increasing pain. At this point because of his continued pain and his anemia. He is down to a hemoglobin of 10. The patient does have a history of thalassemia minor, which could also be adding to this. His most recent hemoglobin was 12.2 and 2016. This point, I have spoken to Dr. Jansen will admit the patient to the ICU for further treatment and care.    CRITICAL CARE  The very real possibilty of a deterioration of this patient's condition required the highest level of my preparedness for sudden, emergent intervention.  I provided critical care services, which included medication orders, frequent reevaluations of the patient's condition and response to treatment, ordering and reviewing test results, and discussing the case with various consultants.  The critical care time associated with the care of the patient was 33 minutes. Review chart for interventions. This time is exclusive of any other billable procedures.       DISPOSITION:  Admit to ICU Trauma        FINAL IMPRESSION  1. Laceration of spleen, initial encounter    2. Thalassemia minor          IJesus Manuel (Sheyla), am scribing for, and in the presence of, Tyson Hernandez M.D..    Electronically signed by: Jesus Manuel Heaton), 11/28/2017    RENETTA  Tyson Hernandez M.D. personally performed the services described in this documentation, as scribed by Jesus Manuel Hdz in my presence, and it is both accurate and complete.    The note accurately reflects work and decisions made by me.  Tyson Hernandez  11/28/2017  9:55 PM

## 2017-11-29 NOTE — PROGRESS NOTES
"  Trauma/Surgical Progress Note    Author: Ifeoma Lanza / Darrel Echevarria MD Date & Time created: 11/29/2017   2:11 PM     Interval Events:    H/H stable  Tolerating clear liquids  Minimal abdominal pain  tertiary survey completed   SBART completed  Stable for transfer to surgical floor    Review of Systems   Constitutional: Negative for chills and fever.   Respiratory: Negative for shortness of breath.    Cardiovascular: Negative for chest pain.   Gastrointestinal: Positive for abdominal pain (left upper Quad). Negative for diarrhea, nausea and vomiting.        + flatus   Genitourinary: Negative for dysuria.   Musculoskeletal: Positive for joint pain (nonreporducable right shoulder pain with breathing).   Neurological: Negative for dizziness.     Hemodynamics:  Blood pressure (!) 164/96, pulse (!) 105, temperature 36.3 °C (97.4 °F), resp. rate (!) 48, height 1.803 m (5' 11\"), weight 81.6 kg (179 lb 14.3 oz), SpO2 94 %.     Respiratory:    Respiration: (!) 48, Pulse Oximetry: 94 %, O2 Daily Delivery Respiratory : Room Air with O2 Available     PEP/CPT Method: Positive Airway Pressure Device  RUL Breath Sounds: Clear, RML Breath Sounds: Clear, RLL Breath Sounds: Diminished, NEWTON Breath Sounds: Clear, LLL Breath Sounds: Diminished  Fluids:    Intake/Output Summary (Last 24 hours) at 11/29/17 1411  Last data filed at 11/29/17 1200   Gross per 24 hour   Intake             1660 ml   Output              650 ml   Net             1010 ml     Admit Weight: 116.6 kg (257 lb 0.9 oz)  Current Weight: 81.6 kg (179 lb 14.3 oz)    Physical Exam   Constitutional: He is oriented to person, place, and time. He appears well-developed and well-nourished.   HENT:   Head: Normocephalic.   Eyes:   Slight juandice   Cardiovascular: Normal rate and regular rhythm.    Pulmonary/Chest: Effort normal and breath sounds normal. He exhibits no tenderness.   Abdominal: There is tenderness (left side).   Musculoskeletal: Normal range of motion. He " exhibits no edema or tenderness.   Neurological: He is alert and oriented to person, place, and time.   Skin: Skin is warm and dry.   Psychiatric: He has a normal mood and affect. His behavior is normal. Thought content normal.   Nursing note and vitals reviewed.      Medical Decision Making/Problem List:    Active Hospital Problems    Diagnosis   • Splenic laceration [S36.039A]     Priority: High     11/28 CT imaging - Small perisplenic hematoma is likely secondary to a posterior superior grade 1 or 2 splenic laceration.   - Small hemoperitoneum layers in the pelvis.  - Worsening moderate splenomegaly on CT  11/29 hemoglobin stable  11/30 US      • Anemia associated with acute blood loss [D62]     Priority: Medium     Transfuse 1 unit PRBC if Hb < 7.0  Check iron studies and replace as indicated.     • Hypokalemia [E87.6]     Priority: Medium     Supplement and trend     • Abnormal liver enzymes [R74.8]     Priority: Medium     History of   4/12/10 Abdominal ultrasound hepatic steatosis with focal fatty sparing adjacent to the mac hepatis.  6/8/12 Abdominal ultrasound with hepatic fatty infiltration. Hepatosplenomegaly.Previous cholecystectomy without biliary dilation to suggest obstruction.  11/28/17 The liver enhances normally.  Trend laboratory studies     • Essential hypertension, benign [I10]     Priority: Medium     Chronic condition treated with losartan and felodipine.  11/29 resume home medications when clinically indicated       • Trauma [T14.90XA]     Priority: Low     Fell off a moving [employer] train last week on Tuesday.    Went to workman's comp office today for increasing pain, CXR and blood work completed.    Non Trauma activation.     • H/O thalassemia minor [Z86.2]     Priority: Low     Chronic condition. No outpatient treatment.     • Contraindication to deep vein thrombosis (DVT) prophylaxis [Z53.09]     Priority: Low     Systemic anticoagulation contraindicated secondary to elevated bleeding  "risk.  Consider surveillance venous duplex scanning if pharmacological DVT prophylaxis contraindicated greater than 48 hrs from admission  11/30 initiate lovenox pending AM CBC       Core Measures & Quality Metrics:  Labs reviewed and Medications reviewed  Eckert catheter: No Eckert      DVT Prophylaxis: Contraindicated - High bleeding risk  DVT prophylaxis - mechanical: SCDs  Ulcer prophylaxis: Yes    Assessed for rehab: Patient returned to prior level of function, rehabilitation not indicated at this time    Total Score: 2  ETOH Screening  CAGE Score: 1  Intervention complete date: 11/29/2017  Patient response to intervention: drinks \"two\" drinks a day, denies any concern over abuse potential, chews tobacco, denies drug.   Plan of care: education, tobacco cessation    has not been contacted.Follow up with: PCP  Total ETOH intervention time: 15 - 30 mintues    Discussed patient condition with RN, Patient and trauma surgery. Dr. NOAH Echevarria     Patient seen, data reviewed and discussed.  Agree with assessment and plan.  CHERYL    "

## 2017-11-29 NOTE — H&P
DATE OF ADMISSION:  11/28/2017.    IDENTIFICATION:  A 37-year-old male.    HISTORY OF PRESENT ILLNESS:  Patient was in his usual state of health until a   week ago when he was working.  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  He was doing okay and started having increasing   left-sided chest pain over the last 2 days.  He went to workmen's compensation   office today where he had increasing pain.  He was sent to the emergency room   for evaluation and he was found to have on CT scan a grade II splenic   laceration with a small amount of hemoperitoneum.  He was being admitted at   this time for treatment.    PAST MEDICAL HISTORY AND ILLNESSES:  History of thalassemia minor as well as   hypertension and asthma.    PAST SURGICAL HISTORY:  None.    MEDICATIONS:  Losartan and Plendil.    ALLERGIES:  None.    SOCIAL HISTORY:  He has a history of smoking, but stopped several years ago.    REVIEW OF SYSTEMS:  Otherwise not obtainable due to his trauma.    PHYSICAL EXAMINATION:  VITAL SIGNS:  His blood pressure was 149/82, heart rate in the 90s.  GENERAL:  He is alert and cooperative.  HEENT:  Head is without evidence of trauma.  Pupils are 4 mm.  NECK:  Not in a C-collar.  Trachea is midline.  CHEST:  Nontender.  ABDOMEN:  Soft except for his left upper quadrant, which is tender, but he has   no peritoneal signs.  EXTREMITIES:  Without evidence of deformity.  NEUROLOGIC:  GCS of 15.    LABORATORY DATA:  His hemoglobin is 10 on admission; however, with his prior   hemoglobin a year ago was 12.  Chemistries demonstrate sodium of 133, BUN 14,   bicarbonate of 27.  Liver functions are mildly elevated at 135 and 168.  INR   is 1.15.    IMPRESSION:  A 29-year-old male status post a fall 1 week ago with a grade II   splenic injury and underlying thalassemia minor.    PLAN:  Will be admission to ICU.  We will do serial hemoglobins and monitor   his blood pressure.  We will keep him n.p.o. currently  and if his hemoglobin   stabilizes, we will start him on diet in the morning.       ____________________________________     MD SHEELA CAMARILLO / DESIRAE    DD:  11/28/2017 23:00:56  DT:  11/29/2017 00:14:18    D#:  [id number]  Job#:  [id number]

## 2017-11-29 NOTE — ED NOTES
Med rec updated and complete.  Allergies reviewed.  Pt has not taken his medications since   Nov 21 2017 as he is currently out of refills.    Pt denies antibiotic use in last 30 days.

## 2017-11-30 ENCOUNTER — APPOINTMENT (OUTPATIENT)
Dept: RADIOLOGY | Facility: MEDICAL CENTER | Age: 37
DRG: 815 | End: 2017-11-30
Attending: NURSE PRACTITIONER
Payer: COMMERCIAL

## 2017-11-30 PROBLEM — E87.6 HYPOKALEMIA: Status: RESOLVED | Noted: 2017-11-28 | Resolved: 2017-11-30

## 2017-11-30 LAB
ALBUMIN SERPL BCP-MCNC: 3.8 G/DL (ref 3.2–4.9)
ALBUMIN/GLOB SERPL: 1.5 G/DL
ALP SERPL-CCNC: 52 U/L (ref 30–99)
ALT SERPL-CCNC: 122 U/L (ref 2–50)
ANION GAP SERPL CALC-SCNC: 8 MMOL/L (ref 0–11.9)
AST SERPL-CCNC: 82 U/L (ref 12–45)
BILIRUB SERPL-MCNC: 3.7 MG/DL (ref 0.1–1.5)
BUN SERPL-MCNC: 9 MG/DL (ref 8–22)
CALCIUM SERPL-MCNC: 8.7 MG/DL (ref 8.5–10.5)
CHLORIDE SERPL-SCNC: 100 MMOL/L (ref 96–112)
CO2 SERPL-SCNC: 28 MMOL/L (ref 20–33)
CREAT SERPL-MCNC: 0.67 MG/DL (ref 0.5–1.4)
ERYTHROCYTE [DISTWIDTH] IN BLOOD BY AUTOMATED COUNT: 45.1 FL (ref 35.9–50)
GFR SERPL CREATININE-BSD FRML MDRD: >60 ML/MIN/1.73 M 2
GLOBULIN SER CALC-MCNC: 2.6 G/DL (ref 1.9–3.5)
GLUCOSE SERPL-MCNC: 94 MG/DL (ref 65–99)
HCT VFR BLD AUTO: 33.5 % (ref 42–52)
HGB BLD-MCNC: 10.2 G/DL (ref 14–18)
HGB RETIC QN AUTO: 22.6 PG/CELL (ref 29–35)
IMM RETICS NFR: 32.3 % (ref 9.3–17.4)
IRON SATN MFR SERPL: 29 % (ref 15–55)
IRON SERPL-MCNC: 77 UG/DL (ref 50–180)
MAGNESIUM SERPL-MCNC: 2.3 MG/DL (ref 1.5–2.5)
MCH RBC QN AUTO: 22.3 PG (ref 27–33)
MCHC RBC AUTO-ENTMCNC: 30.4 G/DL (ref 33.7–35.3)
MCV RBC AUTO: 73.3 FL (ref 81.4–97.8)
PHOSPHATE SERPL-MCNC: 4.1 MG/DL (ref 2.5–4.5)
PLATELET # BLD AUTO: 164 K/UL (ref 164–446)
POTASSIUM SERPL-SCNC: 4.2 MMOL/L (ref 3.6–5.5)
PROT SERPL-MCNC: 6.4 G/DL (ref 6–8.2)
RBC # BLD AUTO: 4.57 M/UL (ref 4.7–6.1)
RETICS # AUTO: 0.21 M/UL (ref 0.04–0.06)
RETICS/RBC NFR: 4.5 % (ref 0.8–2.1)
SODIUM SERPL-SCNC: 136 MMOL/L (ref 135–145)
TIBC SERPL-MCNC: 267 UG/DL (ref 250–450)
WBC # BLD AUTO: 6.4 K/UL (ref 4.8–10.8)

## 2017-11-30 PROCEDURE — A9270 NON-COVERED ITEM OR SERVICE: HCPCS | Performed by: NURSE PRACTITIONER

## 2017-11-30 PROCEDURE — 83735 ASSAY OF MAGNESIUM: CPT

## 2017-11-30 PROCEDURE — 85027 COMPLETE CBC AUTOMATED: CPT

## 2017-11-30 PROCEDURE — 36415 COLL VENOUS BLD VENIPUNCTURE: CPT

## 2017-11-30 PROCEDURE — 700112 HCHG RX REV CODE 229: Performed by: NURSE PRACTITIONER

## 2017-11-30 PROCEDURE — 76705 ECHO EXAM OF ABDOMEN: CPT

## 2017-11-30 PROCEDURE — 85046 RETICYTE/HGB CONCENTRATE: CPT

## 2017-11-30 PROCEDURE — 700111 HCHG RX REV CODE 636 W/ 250 OVERRIDE (IP): Performed by: NURSE PRACTITIONER

## 2017-11-30 PROCEDURE — 83550 IRON BINDING TEST: CPT

## 2017-11-30 PROCEDURE — 84100 ASSAY OF PHOSPHORUS: CPT

## 2017-11-30 PROCEDURE — 700101 HCHG RX REV CODE 250: Performed by: NURSE PRACTITIONER

## 2017-11-30 PROCEDURE — 700102 HCHG RX REV CODE 250 W/ 637 OVERRIDE(OP): Performed by: NURSE PRACTITIONER

## 2017-11-30 PROCEDURE — 83540 ASSAY OF IRON: CPT

## 2017-11-30 PROCEDURE — 80053 COMPREHEN METABOLIC PANEL: CPT

## 2017-11-30 PROCEDURE — 770006 HCHG ROOM/CARE - MED/SURG/GYN SEMI*

## 2017-11-30 RX ADMIN — OXYCODONE HYDROCHLORIDE 10 MG: 10 TABLET ORAL at 22:22

## 2017-11-30 RX ADMIN — DOCUSATE SODIUM 100 MG: 100 CAPSULE ORAL at 08:24

## 2017-11-30 RX ADMIN — ACETAMINOPHEN 1000 MG: 500 TABLET ORAL at 11:07

## 2017-11-30 RX ADMIN — ACETAMINOPHEN 1000 MG: 500 TABLET ORAL at 06:22

## 2017-11-30 RX ADMIN — OXYCODONE HYDROCHLORIDE 5 MG: 5 TABLET ORAL at 13:04

## 2017-11-30 RX ADMIN — OXYCODONE HYDROCHLORIDE 10 MG: 10 TABLET ORAL at 06:22

## 2017-11-30 RX ADMIN — FAMOTIDINE 20 MG: 20 TABLET, FILM COATED ORAL at 08:25

## 2017-11-30 RX ADMIN — POTASSIUM CHLORIDE 20 MEQ: 1500 TABLET, EXTENDED RELEASE ORAL at 08:25

## 2017-11-30 RX ADMIN — MAGNESIUM HYDROXIDE 30 ML: 400 SUSPENSION ORAL at 08:24

## 2017-11-30 RX ADMIN — BACITRACIN ZINC AND POLYMYXIN B SULFATE: 500; 10000 OINTMENT TOPICAL at 09:00

## 2017-11-30 RX ADMIN — ACETAMINOPHEN 1000 MG: 500 TABLET ORAL at 17:28

## 2017-11-30 RX ADMIN — OXYCODONE HYDROCHLORIDE 10 MG: 10 TABLET ORAL at 00:01

## 2017-11-30 RX ADMIN — ACETAMINOPHEN 1000 MG: 500 TABLET ORAL at 22:22

## 2017-11-30 RX ADMIN — POLYETHYLENE GLYCOL 3350 1 PACKET: 17 POWDER, FOR SOLUTION ORAL at 08:24

## 2017-11-30 RX ADMIN — ENOXAPARIN SODIUM 30 MG: 100 INJECTION SUBCUTANEOUS at 20:52

## 2017-11-30 RX ADMIN — OXYCODONE HYDROCHLORIDE 10 MG: 10 TABLET ORAL at 17:28

## 2017-11-30 RX ADMIN — ACETAMINOPHEN 1000 MG: 500 TABLET ORAL at 00:00

## 2017-11-30 ASSESSMENT — PAIN SCALES - GENERAL
PAINLEVEL_OUTOF10: 4
PAINLEVEL_OUTOF10: 8
PAINLEVEL_OUTOF10: 7
PAINLEVEL_OUTOF10: 5
PAINLEVEL_OUTOF10: 2
PAINLEVEL_OUTOF10: 8

## 2017-11-30 ASSESSMENT — ENCOUNTER SYMPTOMS
NAUSEA: 1
ABDOMINAL PAIN: 1
EYES NEGATIVE: 1
SHORTNESS OF BREATH: 0
FEVER: 0
DIZZINESS: 0
CHILLS: 0
DIARRHEA: 0
VOMITING: 0

## 2017-11-30 NOTE — CARE PLAN
Problem: Bowel/Gastric:  Goal: Normal bowel function is maintained or improved  Bowel protocol in place     Problem: Knowledge Deficit  Goal: Knowledge of disease process/condition, treatment plan, diagnostic tests, and medications will improve  Outcome: PROGRESSING AS EXPECTED  Pt educated on the function of the spleen

## 2017-11-30 NOTE — PROGRESS NOTES
"  Trauma/Surgical Progress Note    Author: Maria Isabel Eckert Date & Time created: 11/30/2017   3:04 PM     Interval Events:  Transfer from ICU to [facility]  Hgb stable - Lovenox initiated  Splenic US reviewed with Dr. Jansen, up at [facility]  Advance diet to regular as tolerated  Adequate pain control, some nausea overnight, consider changing pain medication if it persists  Approaching discharge    Review of Systems   Constitutional: Negative for chills and fever.   HENT: Negative.    Eyes: Negative.    Respiratory: Negative for shortness of breath.    Cardiovascular: Negative for chest pain.   Gastrointestinal: Positive for abdominal pain (mild LUQ) and nausea. Negative for diarrhea and vomiting.   Genitourinary: Negative for dysuria.        Voiding   Skin: Negative.    Neurological: Negative for dizziness.     Hemodynamics:  Blood pressure 133/86, pulse 98, temperature 36.7 °C (98.1 °F), resp. rate 17, height 1.803 m (5' 10.98\"), weight 111.6 kg (246 lb 0.5 oz), SpO2 90 %.     Respiratory:    Respiration: 17, Pulse Oximetry: 90 %        RUL Breath Sounds: Clear, RML Breath Sounds: Clear, RLL Breath Sounds: Clear, NEWTON Breath Sounds: Clear, LLL Breath Sounds: Clear  Fluids:    Intake/Output Summary (Last 24 hours) at 11/30/17 1504  Last data filed at 11/30/17 1123   Gross per 24 hour   Intake             1420 ml   Output             1100 ml   Net              320 ml     Admit Weight: 116.6 kg (257 lb 0.9 oz)  Current Weight: 111.6 kg (246 lb 0.5 oz)    Physical Exam   Constitutional: He is oriented to person, place, and time. He appears well-developed and well-nourished.   HENT:   Head: Normocephalic.   Eyes: Conjunctivae are normal.   Neck: Neck supple.   Cardiovascular: Normal rate.    Pulmonary/Chest: Effort normal and breath sounds normal. He exhibits no tenderness.   Room air   Abdominal: Soft. Bowel sounds are normal. He exhibits no distension. There is tenderness (Mild LUQ).   Musculoskeletal: Normal range of motion. He " exhibits no edema or tenderness.   Neurological: He is alert and oriented to person, place, and time.   Skin: Skin is warm and dry. He is not diaphoretic.   Psychiatric: He has a normal mood and affect. His behavior is normal. Thought content normal.   Nursing note and vitals reviewed.      Medical Decision Making/Problem List:    Active Hospital Problems    Diagnosis   • Splenic laceration [S36.039A]     Priority: High     11/28 CT imaging - Small perisplenic hematoma is likely secondary to a posterior superior grade 1 or 2 splenic laceration.    - Small hemoperitoneum layers in the pelvis.   - Worsening moderate splenomegaly on CT  11/29 - Hemoglobin stable  11/30 - US shows Splenomegaly. Trace perisplenic fluid. Hematoma seen on CT is not appreciated.  Laboratory studies and abdominal exams stable     • Anemia associated with acute blood loss [D62]     Priority: Medium     Transfuse 1 unit PRBC if Hb < 7.0  Check iron studies and replace as indicated.      • Abnormal liver enzymes [R74.8]     Priority: Medium     History of abnormal liver enzymes  4/12/10 - Abdominal ultrasound hepatic steatosis with focal fatty sparing adjacent to the mac hepatis.  6/8/12 - Abdominal ultrasound with hepatic fatty infiltration. Hepatosplenomegaly.Previous cholecystectomy without biliary dilation to suggest obstruction.  11/28/17 - The liver enhances normally.  Trend laboratory studies     • Essential hypertension, benign [I10]     Priority: Medium     Chronic condition treated with losartan and felodipine.  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    Went to workman's comp office today for increasing pain, CXR and blood work completed.    Non Trauma activation.     • H/O thalassemia minor [Z86.2]     Priority: Low     Chronic condition. No outpatient treatment.     • Contraindication to deep vein thrombosis  "(DVT) prophylaxis [Z53.09]     Priority: Low     Systemic anticoagulation initially contraindicated secondary to elevated bleeding risk.  RAP score 2  11/30 - Prophylactic Lovenox initiated  Lower extremity duplex if clinically indicated       Core Measures & Quality Metrics:  Labs reviewed and Medications reviewed  Eckert catheter: No Eckert      DVT Prophylaxis: Enoxaparin (Lovenox)  DVT prophylaxis - mechanical: SCDs  Ulcer prophylaxis: Yes    Assessed for rehab: Patient returned to prior level of function, rehabilitation not indicated at this time    Total Score: 2     ETOH Screening  CAGE Score: 1  Intervention complete date: 11/29/2017  Patient response to intervention: drinks \"two\" drinks a day, denies any concern over abuse potential, chews tobacco, denies drug.   Plan of care: education, tobacco cessation    has not been contacted.Follow up with: PCP  Total ETOH intervention time: 15 - 30 mintues    Discussed patient condition with RN, Patient and trauma surgery. Dr. Jansen        "

## 2017-11-30 NOTE — PROGRESS NOTES
Bedside report received.  Assessment complete.  A&O x 4. Patient calls appropriately.  Denies numbness and tingling. Moves all extremities.   Patient up with no assist. Bed alarm refused. Pt educated.   Patient declines pain.  Denies N&V. Tolerating clear liquid diet.  + void, + flatus  Patient denies SOB.  SCD's refused. Pt edcuated.  Patient inquiring into the continuation of his at home BP mediations and advancment of diet. Education given.   Review plan with of care with patient. Call light and personal belongings with in reach. Hourly rounding in place. All needs met at this time.

## 2017-11-30 NOTE — PROGRESS NOTES
IV Iron Per Pharmacy Note    Lab Results   Component Value Date/Time    WBC 6.4 11/30/2017 02:51 AM    RBC 4.57 (L) 11/30/2017 02:51 AM    HEMOGLOBIN 10.2 (L) 11/30/2017 02:51 AM    HEMATOCRIT 33.5 (L) 11/30/2017 02:51 AM    MCV 73.3 (L) 11/30/2017 02:51 AM    MCH 22.3 (L) 11/30/2017 02:51 AM    MCHC 30.4 (L) 11/30/2017 02:51 AM       Recent Labs      11/30/17   0251   IRON  77         Recent Labs      11/30/17   0251   CREATININE  0.67          Assessment/Plan:  1. IV Iron not indicated according to current labs.     Katie Holman, BrockD

## 2017-11-30 NOTE — PROGRESS NOTES
Pt arrived to unit via wheelchair. Pt is alert and oriented. Ambulates independently with steady gait. Skin is intact and no bruising noted. Pt has pain and tenderness to left side, has pillow to splint if he needs to cough. Will monitor. Call light and side table in reach.

## 2017-11-30 NOTE — DISCHARGE PLANNING
Care Transition Team Assessment    Information Source  Orientation : Oriented x 4  Information Given By: Patient  Who is responsible for making decisions for patient? : Patient    Readmission Evaluation  Is this a readmission?: No    Elopement Risk  Legal Hold: No  Ambulatory or Self Mobile in Wheelchair: Yes  Disoriented: No  Psychiatric Symptoms: None  History of Wandering: No  Elopement this Admit: No  Vocalizing Wanting to Leave: No  Displays Behaviors, Body Language Wanting to Leave: No-Not at Risk for Elopement  Elopement Risk: Not at Risk for Elopement    Interdisciplinary Discharge Planning  Does Admitting Nurse Feel This Could be a Complex Discharge?: No  Primary Care Physician: Gareth floyd  Lives with - Patient's Self Care Capacity: Child Less than 18 Years of Age, Significant Other  Patient or legal guardian wants to designate a caregiver (see row info): No  Support Systems: Family Member(s), Friends / Neighbors  Housing / Facility: [address] [place] [address]  Do You Take your Prescribed Medications Regularly: Yes  Able to Return to Previous ADL's: Yes  Mobility Issues: No  Prior Services: None  Patient Expects to be Discharged to:: home  Assistance Needed: No  Durable Medical Equipment: Not Applicable    Discharge Preparedness  What is your plan after discharge?: Home with help  What are your discharge supports?: Child, Spouse  Prior Functional Level: Independent with Activities of Daily Living, Independent with Medication Management  Difficulity with ADLs: None  Difficulity with IADLs: None    Functional Assesment  Prior Functional Level: Independent with Activities of Daily Living, Independent with Medication Management    Finances  Financial Barriers to Discharge: No  Prescription Coverage: Yes    Vision / Hearing Impairment  Vision Impairment : No  Hearing Impairment : No    Values / Beliefs / Concerns  Values / Beliefs Concerns : No    Advance Directive  Advance Directive?: None    Domestic Abuse  Have you ever  been the victim of abuse or violence?: No  Physical Abuse or Sexual Abuse: No  Verbal Abuse or Emotional Abuse: No  Possible Abuse Reported to:: Not Applicable    Psychological Assessment  History of Substance Abuse: None  History of Psychiatric Problems: No  Non-compliant with Treatment: No  Newly Diagnosed Illness: No    Discharge Risks or Barriers  Discharge risks or barriers?: No    Anticipated Discharge Information  Anticipated discharge disposition: Home

## 2017-11-30 NOTE — DIETARY
"Nutrition Services    Pt seen per nutrition admit screen trigger: poor po PTA     Pt is a 36 yo male with adm dx: s/p fall and per CT scan found to have a grade II splenic laceration with a small amount of hemoperitoneum  Past Medical History:   Diagnosis Date   • ASTHMA    • Hypertension 2014   • Infectious disease age 6    chicken pox   • Thalassanemia 1979     Spoke w/pt at bedside, he appears well nourished. He reports he has a good appetite but he states he usually does not eat a lot.  Pt stated he just does not have a very large appetite. He denies having any issues chewing or swallowing at this time. Offered snacks, pt refused at this time.     Discussed wt hx, pt reports his wt has been around 109 kg (240#) recently.  Obtained a stand up scale wt today; wt was 111.6 kg which is close to pt's usual body weight.     Current diet: Full liquid - per ADLs, consumed % of breakfast and observed % of lunch consumed.   Ht: 60\"  Wt: (11/30) 111.6 kg via stand up scale   Body mass index is 34.33 kg/m². - obese, class I   Pertinent labs: reviewed   Pertinent meds: reviewed   Fluids: no IVF at this time   Skin: no skin breakdown noted at this time   GI: per ADLs, last BM 11/28 - pt denies having any abd pain, discomfort or difficulties moving his bowels     Recommendations:   · Encourage po intake of meals   · Advance diet past FL as pt is able per SLP   · Record percentage of meals conusmed in ADLs to help monitor po adequacy  · Monitor wt and lab trends     RD following       "

## 2017-11-30 NOTE — CARE PLAN
Problem: Safety  Goal: Will remain free from injury    Intervention: Provide assistance with mobility  Call bell with in reach. Pt encouraged to call for help before getting out of bed.      Problem: Pain Management  Goal: Pain level will decrease to patient's comfort goal    Intervention: Follow pain managment plan developed in collaboration with patient and Interdisciplinary Team  Pain assessed Q 2 hours

## 2017-12-01 VITALS
RESPIRATION RATE: 16 BRPM | HEART RATE: 96 BPM | DIASTOLIC BLOOD PRESSURE: 90 MMHG | SYSTOLIC BLOOD PRESSURE: 142 MMHG | HEIGHT: 71 IN | TEMPERATURE: 97.8 F | BODY MASS INDEX: 34.44 KG/M2 | OXYGEN SATURATION: 92 % | WEIGHT: 246.03 LBS

## 2017-12-01 LAB
ANION GAP SERPL CALC-SCNC: 10 MMOL/L (ref 0–11.9)
BASOPHILS # BLD AUTO: 0.6 % (ref 0–1.8)
BASOPHILS # BLD: 0.04 K/UL (ref 0–0.12)
BUN SERPL-MCNC: 13 MG/DL (ref 8–22)
CALCIUM SERPL-MCNC: 8.9 MG/DL (ref 8.5–10.5)
CHLORIDE SERPL-SCNC: 101 MMOL/L (ref 96–112)
CO2 SERPL-SCNC: 27 MMOL/L (ref 20–33)
CREAT SERPL-MCNC: 0.69 MG/DL (ref 0.5–1.4)
EOSINOPHIL # BLD AUTO: 0.11 K/UL (ref 0–0.51)
EOSINOPHIL NFR BLD: 1.7 % (ref 0–6.9)
ERYTHROCYTE [DISTWIDTH] IN BLOOD BY AUTOMATED COUNT: 44.3 FL (ref 35.9–50)
GFR SERPL CREATININE-BSD FRML MDRD: >60 ML/MIN/1.73 M 2
GLUCOSE SERPL-MCNC: 104 MG/DL (ref 65–99)
HCT VFR BLD AUTO: 31.7 % (ref 42–52)
HGB BLD-MCNC: 9.8 G/DL (ref 14–18)
IMM GRANULOCYTES # BLD AUTO: 0.04 K/UL (ref 0–0.11)
IMM GRANULOCYTES NFR BLD AUTO: 0.6 % (ref 0–0.9)
LYMPHOCYTES # BLD AUTO: 1.73 K/UL (ref 1–4.8)
LYMPHOCYTES NFR BLD: 27.2 % (ref 22–41)
MCH RBC QN AUTO: 22.6 PG (ref 27–33)
MCHC RBC AUTO-ENTMCNC: 30.9 G/DL (ref 33.7–35.3)
MCV RBC AUTO: 73.2 FL (ref 81.4–97.8)
MONOCYTES # BLD AUTO: 0.53 K/UL (ref 0–0.85)
MONOCYTES NFR BLD AUTO: 8.3 % (ref 0–13.4)
NEUTROPHILS # BLD AUTO: 3.9 K/UL (ref 1.82–7.42)
NEUTROPHILS NFR BLD: 61.6 % (ref 44–72)
NRBC # BLD AUTO: 0.03 K/UL
NRBC BLD AUTO-RTO: 0.5 /100 WBC
PLATELET # BLD AUTO: 168 K/UL (ref 164–446)
POTASSIUM SERPL-SCNC: 4.3 MMOL/L (ref 3.6–5.5)
RBC # BLD AUTO: 4.33 M/UL (ref 4.7–6.1)
SODIUM SERPL-SCNC: 138 MMOL/L (ref 135–145)
WBC # BLD AUTO: 6.4 K/UL (ref 4.8–10.8)

## 2017-12-01 PROCEDURE — 80048 BASIC METABOLIC PNL TOTAL CA: CPT

## 2017-12-01 PROCEDURE — 36415 COLL VENOUS BLD VENIPUNCTURE: CPT

## 2017-12-01 PROCEDURE — 700111 HCHG RX REV CODE 636 W/ 250 OVERRIDE (IP): Performed by: NURSE PRACTITIONER

## 2017-12-01 PROCEDURE — 85025 COMPLETE CBC W/AUTO DIFF WBC: CPT

## 2017-12-01 RX ORDER — BACITRACIN ZINC AND POLYMYXIN B SULFATE 500; 1000 [USP'U]/G; [USP'U]/G
1 OINTMENT TOPICAL 3 TIMES DAILY
Qty: 1 TUBE | Refills: 0 | COMMUNITY
Start: 2017-12-01 | End: 2019-03-23

## 2017-12-01 RX ORDER — ACETAMINOPHEN 500 MG
1000 TABLET ORAL EVERY 6 HOURS PRN
COMMUNITY
Start: 2017-12-01 | End: 2019-03-23

## 2017-12-01 RX ORDER — OXYCODONE HYDROCHLORIDE 5 MG/1
5 TABLET ORAL EVERY 4 HOURS PRN
Qty: 20 TAB | Refills: 0 | Status: SHIPPED | OUTPATIENT
Start: 2017-12-01 | End: 2019-03-23

## 2017-12-01 RX ADMIN — ONDANSETRON 4 MG: 2 INJECTION INTRAMUSCULAR; INTRAVENOUS at 07:51

## 2017-12-01 RX ADMIN — ENOXAPARIN SODIUM 30 MG: 100 INJECTION SUBCUTANEOUS at 07:51

## 2017-12-01 ASSESSMENT — ENCOUNTER SYMPTOMS
NAUSEA: 1
SHORTNESS OF BREATH: 0
DIZZINESS: 0
CHILLS: 0
ABDOMINAL PAIN: 1
BLURRED VISION: 0
DIARRHEA: 0
HEADACHES: 0
VOMITING: 0
FEVER: 0

## 2017-12-01 ASSESSMENT — PAIN SCALES - GENERAL
PAINLEVEL_OUTOF10: 4

## 2017-12-01 NOTE — DISCHARGE INSTRUCTIONS
- Call or seek medical attention for questions or concerns   - No blows to the abdomen, no contact sports   - Follow up with Dr. Jansen in 1 weeks time   - Follow up with primary care provider within one weeks time   - Resume regular diet   - May take over the counter acetaminophen as needed for pain   - Continue daily over the counter stool softener while on narcotics   - No operation of machinery or motorized vehicles while under the influence of narcotics   - No alcohol use while under the influence of narcotics   - No swimming, hot tubs, baths or wound submersion until cleared by outpatient provider. May shower   - No contact sports, strenuous activities, or heavy lifting until cleared by outpatient provider   - If respiratory decompensation, increased pain, or signs or symptoms of infection occur seek medical attention    Discharge Instructions    Discharged to home by car with self. Discharged via walking, hospital escort: Yes.  Special equipment needed: Not Applicable    Be sure to schedule a follow-up appointment with your primary care doctor or any specialists as instructed.     Discharge Plan:   Smoking Cessation Offered: Patient Counseled  Pneumococcal Vaccine Given - only chart on this line when given: Given (See MAR)  Influenza Vaccine Indication: Not indicated: Previously immunized this influenza season and > 8 years of age    I understand that a diet low in cholesterol, fat, and sodium is recommended for good health. Unless I have been given specific instructions below for another diet, I accept this instruction as my diet prescription.   Other diet: regular    Special Instructions: None    · Is patient discharged on Warfarin / Coumadin?   No     · Is patient Post Blood Transfusion?  No    Depression / Suicide Risk    As you are discharged from this [facility] facility, it is important to learn how to keep safe from harming yourself.    Recognize the warning signs:  · Abrupt changes in personality,  positive or negative- including increase in energy   · Giving away possessions  · Change in eating patterns- significant weight changes-  positive or negative  · Change in sleeping patterns- unable to sleep or sleeping all the time   · Unwillingness or inability to communicate  · Depression  · Unusual sadness, discouragement and loneliness  · Talk of wanting to die  · Neglect of personal appearance   · Rebelliousness- reckless behavior  · Withdrawal from people/activities they love  · Confusion- inability to concentrate     If you or a loved one observes any of these behaviors or has concerns about self-harm, here's what you can do:  · Talk about it- your feelings and reasons for harming yourself  · Remove any means that you might use to hurt yourself (examples: pills, rope, extension cords, firearm)  · Get professional help from the community (Mental Health, Substance Abuse, psychological counseling)  · Do not be alone:Call your Safe Contact- someone whom you trust who will be there for you.  · Call your local CRISIS HOTLINE 807-6992 or 762-361-6976  · Call your local Children's Mobile Crisis Response Team [place] (141) 278-2976 or www[employer]  · Call the toll free National Suicide Prevention Hotlines   · National Suicide Prevention Lifeline 350-451-GMTA (1649)  · National Hope Line Network 800-SUICIDE (132-7690)      Splenic Injury  A splenic injury is an injury of the spleen. The spleen is an organ located in the upper left area of your abdomen, just under your ribs. Your spleen filters and cleans your blood. It also stores blood cells and destroys cells that are worn out. Your spleen is also important for fighting disease.   Splenic injuries can vary. In some cases, the spleen may only be bruised with some bleeding inside the covering and around the spleen. Splenic injuries may also cause a deep tear or cut into the spleen (lacerated spleen). Some splenic injuries can cause the spleen to break  open (rupture).  CAUSES   Splenic injuries can be caused by a direct blow (blunt trauma) from:  · Car accidents.  · Contact sports.  · Falls.  Gunshot wounds or knife wounds (penetrating injuries) can also cause a splenic injury.  RISK FACTORS  You may be at greater risk for a splenic injury if you have a disease that can cause the spleen to become enlarged. These include:  · Alcoholic liver disease.  · Viral infections, especially mononucleosis.  SIGNS AND SYMPTOMS   A minor splenic injury often causes no symptoms or only minor abdominal pain. If the injury causes severe bleeding, your blood pressure may rapidly decrease. This may cause:  · Dizziness or light-headedness.  · Rapid heart rate.  · Difficulty breathing.  · Fainting.  · Sweating with clammy skin.  Other signs and symptoms of a splenic injury can include:  · Very bad abdominal pain.  · Pain in the left shoulder.  · Pain when the abdomen is pressed (tenderness).  · Nausea.  · Swelling or bruising of the abdomen.  DIAGNOSIS   Your health care provider may suspect a splenic injury based on your signs and symptoms, especially if you were recently in an accident or you recently got hurt. Your health care provider will do a physical exam. Imaging tests may be done to confirm the diagnosis. These may include:  · Ultrasound.  · CT scan.  You may have frequent blood tests for a few days after the injury to monitor your condition.   TREATMENT   Treatment depends on the type of splenic injury you have and how bad it is. Your health care provider will develop a treatment plan specific to your needs.  · Less severe injuries may be treated with:  ¨ Observation.  ¨ Interventional radiology. This involves using flexible tubes (catheters) to stop the bleeding from inside the blood vessel.    · More severe injuries may require hospitalization in the intensive care unit (ICU). While you are in the ICU:    ¨ Your fluid and blood levels will be monitored closely.  ¨ You will  get fluids through an IV tube as needed.    ¨ You may need follow-up scans to check whether your spleen is able to heal itself. If the injury is getting worse, you may need surgery.  ¨ You may receive donated blood (transfusion).  ¨ You may have a long needle inserted into your abdomen to remove any blood that has collected inside the spleen (hematoma).  · Surgery. If your blood pressure is too low, you may need emergency surgery. This may include:  ¨ Repairing a laceration.  ¨ Removing part of the spleen.  ¨ Removing the entire spleen (splenectomy).  HOME CARE INSTRUCTIONS  · Take medicines only as directed by your health care provider.  · Rest at home.  · Do not participate in any strenuous activity until your health care provider says it is safe to do so.  · Do not lift anything that is heavier than 10 lb (4.5 kg).  · Do not participate in contact sports until your health care provider says it is safe to do so.  SEEK MEDICAL CARE IF:  · You have a fever.  · You have new or increasing pain in your abdomen or in your left shoulder.  SEEK IMMEDIATE MEDICAL CARE IF:  · You have signs or symptoms of internal bleeding. Watch for:  ¨ Sweating.  ¨ Dizziness.  ¨ Weakness.  ¨ Cold and clammy skin.  ¨ Fainting.  · You have chest pain or difficulty breathing.      This information is not intended to replace advice given to you by your health care provider. Make sure you discuss any questions you have with your health care provider.     Document Released: 10/09/2007 Document Revised: 01/08/2016 Document Reviewed: 09/02/2015  [employer] Interactive Patient Education ©2016 [employer]

## 2017-12-01 NOTE — CARE PLAN
Problem: Venous Thromboembolism (VTW)/Deep Vein Thrombosis (DVT) Prevention:  Goal: Patient will participate in Venous Thrombosis (VTE)/Deep Vein Thrombosis (DVT)Prevention Measures  Outcome: PROGRESSING AS EXPECTED  Pt is ambulatory. Pt ambulates self to hallway often. Steady gait noted. Lovenox given per MAR.    Problem: Pain Management  Goal: Pain level will decrease to patient's comfort goal  Outcome: PROGRESSING AS EXPECTED  Pain is reported at 8/10. Medicated per MAR with positive results.

## 2017-12-01 NOTE — PROGRESS NOTES
Bedside report received.  Assessment complete.  A&O x 4. Patient calls appropriately.  Denies numbness and tingling. Moves all extremities.   Patient up with no assist. Bed alarm refused. Pt educated.   Patient has 1/10 pain with no movement and 4/10 when moving. Pt is guarding and grimacing with movement.   + for nausea, - for emesis. Tolerating regular diet.  + void, + flatus  Patient denies SOB at rest. + for dyspnea upon exertion. IS at 2000. 2500 peak.   SCD's refused. Pt educated.  Review plan with of care with patient. Call light and personal belongings with in reach. Hourly rounding in place. All needs met at this time.

## 2017-12-01 NOTE — PROGRESS NOTES
"  Trauma/Surgical Progress Note    Author: Raisa Shine Date & Time created: 12/1/2017   11:06 AM     Interval Events:  Hemoglobin stable  Pain control adequate  Tolerating diet  Nausea improved  Discharge home today  Counseled    Review of Systems   Constitutional: Negative for chills and fever.   Eyes: Negative for blurred vision.   Respiratory: Negative for shortness of breath.    Cardiovascular: Negative for chest pain.   Gastrointestinal: Positive for abdominal pain (mild LUQ) and nausea. Negative for diarrhea and vomiting.   Genitourinary: Negative for dysuria.        Voiding   Neurological: Negative for dizziness and headaches.     Hemodynamics:  Blood pressure 142/90, pulse 96, temperature 36.6 °C (97.8 °F), resp. rate 16, height 1.803 m (5' 10.98\"), weight 111.6 kg (246 lb 0.5 oz), SpO2 92 %.     Respiratory:    Respiration: 16, Pulse Oximetry: 92 %           Fluids:    Intake/Output Summary (Last 24 hours) at 12/01/17 1106  Last data filed at 12/01/17 0320   Gross per 24 hour   Intake             1460 ml   Output                0 ml   Net             1460 ml     Admit Weight: 116.6 kg (257 lb 0.9 oz)  Current Weight: 111.6 kg (246 lb 0.5 oz)    Physical Exam   Constitutional: He is oriented to person, place, and time. He appears well-developed. No distress.   HENT:   Head: Normocephalic.   Eyes: Conjunctivae are normal.   Neck: Neck supple.   Cardiovascular: Normal rate.    Pulmonary/Chest: Effort normal. He exhibits no tenderness.   Abdominal: Soft. Bowel sounds are normal. He exhibits no distension. There is tenderness (Mild LUQ).   Musculoskeletal: He exhibits no edema or tenderness.   Neurological: He is alert and oriented to person, place, and time.   Skin: Skin is warm and dry.   Psychiatric: He has a normal mood and affect. His behavior is normal.   Nursing note and vitals reviewed.      Medical Decision Making/Problem List:    Active Hospital Problems    Diagnosis   • Splenic laceration " [S36.039A]     Priority: High     11/28 CT imaging - Small perisplenic hematoma is likely secondary to a posterior superior grade 1 or 2 splenic laceration.    - Small hemoperitoneum layers in the pelvis.   - Worsening moderate splenomegaly on CT  11/29 - Hemoglobin stable  11/30 - US shows Splenomegaly. Trace perisplenic fluid. Hematoma seen on CT is not appreciated.  12/1 - Hemoglobin stable     • Anemia associated with acute blood loss [D62]     Priority: Medium     Transfuse 1 unit PRBC if Hb < 7.0  Check iron studies and replace as indicated.      • Abnormal liver enzymes [R74.8]     Priority: Medium     History of abnormal liver enzymes  4/12/10 - Abdominal ultrasound hepatic steatosis with focal fatty sparing adjacent to the mac hepatis.  6/8/12 - Abdominal ultrasound with hepatic fatty infiltration. Hepatosplenomegaly.Previous cholecystectomy without biliary dilation to suggest obstruction.  11/28/17 - The liver enhances normally.  11/30 - Trend down     • Essential hypertension, benign [I10]     Priority: Medium     Chronic condition treated with losartan and felodipine.  11/29 - Resume home medications when clinically indicated        • Trauma [T14.90XA]     Priority: Low     Fell off a [employer] train last week on Tuesday.    Went to workman's comp office today for increasing pain, CXR and blood work completed.    Non Trauma activation.     • H/O thalassemia minor [Z86.2]     Priority: Low     Chronic condition. No outpatient treatment.     • Contraindication to deep vein thrombosis (DVT) prophylaxis [Z53.09]     Priority: Low     Systemic anticoagulation initially contraindicated secondary to elevated bleeding risk.  RAP score 2  11/30 - Prophylactic Lovenox initiated  Lower extremity duplex if clinically indicated       Core Measures & Quality Metrics:  Labs reviewed and Medications reviewed  Eckert catheter: No Eckert      DVT Prophylaxis: Enoxaparin (Lovenox)  DVT prophylaxis - mechanical:  "SCDs  Ulcer prophylaxis: Yes    Assessed for rehab: Patient returned to prior level of function, rehabilitation not indicated at this time    Total Score: 2       ETOH Screening  CAGE Score: 1  Intervention complete date: 11/29/2017  Patient response to intervention: drinks \"two\" drinks a day, denies any concern over abuse potential, chews tobacco, denies drug.   Plan of care: education, tobacco cessation    has not been contacted.Follow up with: PCP  Total ETOH intervention time: 15 - 30 mintues    Discussed patient condition with RN, Patient and trauma surgery, Dr. Joy.      Patient seen, data reviewed and discussed.  Agree with assessment and plan.   Hb stable, ok for d/c home. No activities which will could result in a blow to the abdomen or flank for 2 months.    Ben Joy MD  [phone]    "

## 2017-12-01 NOTE — DISCHARGE PLANNING
Medical Social Work    Anticipated D/C plan is home with no needs from case managment when medically cleared.

## 2017-12-01 NOTE — PROGRESS NOTES
Pt discharged by self via own tranportation. Pt not on any narcotics. Prescriptions were given x 1. D/C instructions signed and placed in chart. Chart given to [facility]. Charge notified. Medications from pts drawer returned to pharmacy

## 2017-12-01 NOTE — PROGRESS NOTES
"Received report from day shift RN.   Pt A&O x 4.   Pain reported at 4/10. Pt declines interventions at this time. Pt stating \"When I am resting, my pain is 1/10. When I cough, my pain gets up to 4/10. It is very sharp stabbing pain.\"  Pt denies nausea, vomiting, chest pain, shortness of breath at this time.  +multiple loose BM today. +void. Pt tolerating diet well.  Pt is ambulatory, steady gait noted.  Call light within reach. Bed locked and in lowest position.  All needs are met at this time. Plan of care discussed with pt.   "

## 2017-12-02 NOTE — DISCHARGE SUMMARY
DATE OF ADMISSION:  11/28/2017    DATE OF DISCHARGE:  12/01/2017    ATTENDING PHYSICIAN:  Dr. Amrita Jansen, critical care trauma services.    CONSULTING PHYSICIAN:  No consultations during this hospital course.    DISCHARGE DIAGNOSES:  1.  Splenic laceration.  2.  Anemia associated with acute blood loss.  3.  Abnormal liver enzymes.  4.  Hypokalemia.  5.  Contraindication to DVT prophylaxis.  6.  Essential hypertension.  7.  History of thalassemia minor.  8.  Trauma.    PROCEDURES:  No procedures during this hospital course.    HISTORY OF PRESENT ILLNESS:  Patient is a 37-year-old male who according to   review of records fell off an [employer] train 1 week prior to admission.  He   presented to workman's compensation office related to increased pain.  He was   sent to the emergency room for evaluation and was found to have a grade II   splenic laceration with a small amount of hemoperitoneum.  He was triaged as a   non-trauma and workup in the emergency department resulted in a trauma   consult.    HOSPITAL COURSE:  On arrival, the patient underwent extensive radiographic and   laboratory studies and was admitted to the critical care team under the   direction and supervision of Dr. Amrita Jansen.  He sustained the above   injuries and incurred the above diagnoses during his stay.    He transferred from the emergency department to the intensive care unit for   serial hemoglobins and close monitoring.    He was noted to sustain a grade II splenic laceration.  CT demonstrated a   small perisplenic hematoma with small hemoperitoneum.  He underwent an   abdominal ultrasound on November 30th, which demonstrated splenomegaly with   trace perisplenic fluid hematoma seen on CT was not appreciated.  His   hemoglobins were trended and were noted to be stable.    He was noted to have anemia associated with acute blood loss.  His iron   studies were checked and iron replacement was not indicated.  He has a history   of abnormal  liver enzymes, his liver enzymes were noted to be elevated upon   admission.  They were trending down on the day of discharge.  He was noted to   have electrolyte abnormalities, specifically hypokalemia.  His potassium was   repleted and trended.  On the day of discharge, his potassium has normalized.    He has a past medical history significant for hypertension and thalassemia   minor.  His home medications were resumed upon discharge.    He had a contraindication to DVT prophylaxis, initially he was started on   prophylactic Lovenox on November 30th.    He transferred from the intensive care unit to the general surgical floor   where a tertiary exam was completed.  On the day of discharge, he reports   adequate pain control.  He is tolerating a regular diet.  He is ambulating   well.  His hemoglobin is stable.    DISCHARGE PHYSICAL EXAMINATION:  Please see EPIC physical exam dated   12/01/2017.    DISCHARGE MEDICATIONS:  Narcotics check was completed as provided by [facility].  1.  Oxycodone immediate release 5 mg, take 1 tab by mouth every 4 hours as   needed for pain, dispensed 20, no refills.  2.  Acetaminophen 500 mg, take 2 tabs by mouth every 6 hours as needed.  3.  The patient is to resume his home dose of losartan and felodipine.    DISPOSITION:  The patient will be discharged home in good condition under the   care and supervision of his family on 12/01/2017.  He will follow up with Dr. Jansen in 1 week.  He will follow up with his primary care provider in 1 week.    The patient has been extensively counseled and all questions have been   answered.  Special attention was paid to signs and symptoms of increased   bleeding, increased pain, and to seek immediate medical attention if these   develop.  The patient demonstrates understanding and gives verbal compliance   with discharge instructions.    Time spent on discharge is 40 minutes.       ____________________________________      Raisa Shine NP    [facility]    DD:  12/01/2017 16:09:53  DT:  12/02/2017 11:42:38    D#:  [id number]  Job#:  [id number]    cc: LIZBETH BARRIGA MD, _____ _____

## 2019-03-23 ENCOUNTER — HOSPITAL ENCOUNTER (OUTPATIENT)
Dept: RADIOLOGY | Facility: MEDICAL CENTER | Age: 39
End: 2019-03-23
Attending: NURSE PRACTITIONER
Payer: COMMERCIAL

## 2019-03-23 ENCOUNTER — OFFICE VISIT (OUTPATIENT)
Dept: URGENT CARE | Facility: PHYSICIAN GROUP | Age: 39
End: 2019-03-23
Payer: COMMERCIAL

## 2019-03-23 VITALS
HEIGHT: 71 IN | OXYGEN SATURATION: 96 % | HEART RATE: 110 BPM | RESPIRATION RATE: 18 BRPM | DIASTOLIC BLOOD PRESSURE: 76 MMHG | WEIGHT: 243 LBS | SYSTOLIC BLOOD PRESSURE: 144 MMHG | BODY MASS INDEX: 34.02 KG/M2 | TEMPERATURE: 98.4 F

## 2019-03-23 DIAGNOSIS — R05.9 COUGH: ICD-10-CM

## 2019-03-23 DIAGNOSIS — J45.901 REACTIVE AIRWAY DISEASE WITH ACUTE EXACERBATION, UNSPECIFIED ASTHMA SEVERITY, UNSPECIFIED WHETHER PERSISTENT: ICD-10-CM

## 2019-03-23 PROBLEM — D62 ANEMIA ASSOCIATED WITH ACUTE BLOOD LOSS: Status: RESOLVED | Noted: 2017-11-29 | Resolved: 2019-03-23

## 2019-03-23 PROCEDURE — 71046 X-RAY EXAM CHEST 2 VIEWS: CPT

## 2019-03-23 PROCEDURE — 94640 AIRWAY INHALATION TREATMENT: CPT | Performed by: NURSE PRACTITIONER

## 2019-03-23 PROCEDURE — 99204 OFFICE O/P NEW MOD 45 MIN: CPT | Mod: 25 | Performed by: NURSE PRACTITIONER

## 2019-03-23 PROCEDURE — 94760 N-INVAS EAR/PLS OXIMETRY 1: CPT | Performed by: NURSE PRACTITIONER

## 2019-03-23 RX ORDER — GUAIFENESIN 600 MG/1
600 TABLET, EXTENDED RELEASE ORAL EVERY 12 HOURS
COMMUNITY
End: 2021-06-01

## 2019-03-23 RX ORDER — IPRATROPIUM BROMIDE AND ALBUTEROL SULFATE 2.5; .5 MG/3ML; MG/3ML
3 SOLUTION RESPIRATORY (INHALATION) ONCE
Status: COMPLETED | OUTPATIENT
Start: 2019-03-23 | End: 2019-03-23

## 2019-03-23 RX ORDER — ALBUTEROL SULFATE 90 UG/1
2 AEROSOL, METERED RESPIRATORY (INHALATION) EVERY 6 HOURS PRN
Qty: 8.5 G | Refills: 0 | Status: SHIPPED | OUTPATIENT
Start: 2019-03-23 | End: 2021-03-04

## 2019-03-23 RX ORDER — BENZONATATE 100 MG/1
100 CAPSULE ORAL 3 TIMES DAILY PRN
Qty: 60 CAP | Refills: 0 | Status: SHIPPED | OUTPATIENT
Start: 2019-03-23 | End: 2021-03-04

## 2019-03-23 RX ORDER — PREDNISONE 10 MG/1
20 TABLET ORAL 2 TIMES DAILY
Qty: 20 TAB | Refills: 0 | Status: SHIPPED | OUTPATIENT
Start: 2019-03-23 | End: 2019-03-28

## 2019-03-23 RX ADMIN — IPRATROPIUM BROMIDE AND ALBUTEROL SULFATE 3 ML: 2.5; .5 SOLUTION RESPIRATORY (INHALATION) at 14:11

## 2019-03-23 NOTE — PROGRESS NOTES
Chief Complaint   Patient presents with   • Cough     C/o productive cough, wheezy respirations, chest congesiton x1 mo. Afebrile       HISTORY OF PRESENT ILLNESS: Patient is a 38 y.o. male who presents to urgent care today with complaints of a cough. Notes that for the past month he has had a productive cough, both clear and green sputum. Admits to associated wheezing. Denies chest pain, shortness of breath, fever, chills, leg pain, leg swelling, malaise, fatigue.  Notes he otherwise feels well.  Admits to mild runny nose as well.  He is a former smoker, quit smoking in October.  Admits to history of illness induced asthma, has been prescribed an inhaler before but has run out of the medication.  He was taken over-the-counter cough medication for symptom relief.        Patient Active Problem List    Diagnosis Date Noted   • Splenic laceration 11/28/2017     Priority: High   • Anemia associated with acute blood loss 11/29/2017     Priority: Medium   • Abnormal liver enzymes 11/28/2017     Priority: Medium   • Essential hypertension, benign 12/01/2016     Priority: Medium   • Trauma 11/28/2017     Priority: Low   • H/O thalassemia minor 11/28/2017     Priority: Low   • Contraindication to deep vein thrombosis (DVT) prophylaxis 11/28/2017     Priority: Low   • Chest pain 12/01/2016       Allergies:Patient has no known allergies.    Current Outpatient Prescriptions Ordered in [facility]   Medication Sig Dispense Refill   • guaiFENesin LA (MUCINEX) 600 MG TABLET SR 12 HR Take 600 mg by mouth every 12 hours.     • DiphenhydrAMINE HCl (ZZZQUIL) 50 MG/30ML Liquid Take  by mouth.     • Pseudoephedrine-APAP-DM (DAYQUIL PO) Take  by mouth.       No current Epic-ordered facility-administered medications on file.        Past Medical History:   Diagnosis Date   • ASTHMA    • Hypertension 2014   • Infectious disease age 6    chicken pox   • Thalassanemia 1979       Social History   Substance Use Topics   • Smoking status: Former Smoker  "    Packs/day: 0.25     Years: 17.00     Types: Cigarettes     Start date: 10/10/2000     Quit date: 10/10/2017   • Smokeless tobacco: Current User     Types: Chew      Comment: quit 4/2011   • Alcohol use Yes      Comment: 2 shoots per day liquer vodka       No family status information on file.   History reviewed. No pertinent family history.    ROS:  Review of Systems   Constitutional: Negative for fever, chills, weight loss, malaise, and fatigue.   HENT: Positive for intermittent rhinitis.  Negative for ear pain, nosebleeds, congestion, sore throat and neck pain.    Eyes: Negative for vision changes.   Neuro: Negative for headache, sensory changes, weakness, seizure, LOC.   Cardiovascular: Negative for chest pain, palpitations, orthopnea and leg swelling.   Respiratory: Positive for cough, sputum production, and wheezing.   Gastrointestinal: Negative for abdominal pain, nausea, vomiting or diarrhea.   Genitourinary: Negative for dysuria, urgency and frequency.  Musculoskeletal: Negative for falls, neck pain, back pain, joint pain, myalgias.   Skin: Negative for rash, diaphoresis.     Exam:  Blood pressure 144/76, pulse (!) 112, temperature 36.9 °C (98.4 °F), temperature source Oral, resp. rate 18, height 1.803 m (5' 11\"), weight 110.2 kg (243 lb), SpO2 95 %.  General: well-nourished, well-developed male in NAD  Head: normocephalic, atraumatic  Eyes: PERRLA, no conjunctival injection, acuity grossly intact, lids normal.  Ears: normal shape and symmetry, no tenderness, no discharge. External canals are without any significant edema or erythema. Tympanic membranes are without any inflammation, no effusion. Gross auditory acuity is intact.  Nose: symmetrical without tenderness, no discharge.  Mouth/Throat: reasonable hygiene, no erythema, exudates or tonsillar enlargement.  Neck: no masses, range of motion within normal limits, no tracheal deviation. No obvious thyroid enlargement.   Lymph: no cervical adenopathy. " "No supraclavicular adenopathy.   Neuro: alert and oriented. Cranial nerves 1-12 grossly intact. No sensory deficit.   Cardiovascular: Tachycardic rate (auscultated at 105), regular rhythm. No edema.  Pulmonary: no distress. Chest is symmetrical with respiration, no crackles or rhonchi.  Expiratory wheezes throughout.  Musculoskeletal: no clubbing, appropriate muscle tone, gait is stable.  Skin: warm, dry, intact, no clubbing, no cyanosis, no rashes.   Psych: appropriate mood, affect, judgement.         Assessment/Plan:  1. Reactive airway disease with acute exacerbation, unspecified asthma severity, unspecified whether persistent  ipratropium-albuterol (DUONEB) nebulizer solution    albuterol 108 (90 Base) MCG/ACT Aero Soln inhalation aerosol    Spacer/Aero Chamber Mouthpiece Misc    predniSONE (DELTASONE) 10 MG Tab   2. Cough  DX-CHEST-2 VIEWS    benzonatate (TESSALON) 100 MG Cap       DX chest reviewed by myself, radiology reading \"No acute cardiopulmonary process is identified.\"        X-rays negative for acute process today.  Patient is given a a DuoNeb in clinic, less wheezes auscultated, reported improvement.  His oxygen improved to 96%, his pulse decreased to 110.  At this time I will provide a prescription for prednisone and albuterol for home use.  As well as Tessalon Perles for cough.  Supportive care, differential diagnoses, and indications for immediate follow-up discussed with patient.   Pathogenesis of diagnosis discussed including typical length and natural progression.   Instructed to return to clinic or nearest emergency department for any change in condition, further concerns, or worsening of symptoms.  Patient states understanding of the plan of care and discharge instructions.  Instructed to make an appointment, for follow up, with his primary care provider.        Please note that this dictation was created using voice recognition software. I have made every reasonable attempt to correct obvious " errors, but I expect that there are errors of grammar and possibly content that I did not discover before finalizing the note.      EDMUND Valles.

## 2019-07-12 ENCOUNTER — HOSPITAL ENCOUNTER (OUTPATIENT)
Dept: LAB | Facility: MEDICAL CENTER | Age: 39
End: 2019-07-12
Attending: INTERNAL MEDICINE
Payer: COMMERCIAL

## 2019-07-12 LAB
25(OH)D3 SERPL-MCNC: 16 NG/ML (ref 30–100)
ALBUMIN SERPL BCP-MCNC: 4.1 G/DL (ref 3.2–4.9)
ALBUMIN/GLOB SERPL: 1.5 G/DL
ALP SERPL-CCNC: 46 U/L (ref 30–99)
ALT SERPL-CCNC: 128 U/L (ref 2–50)
AMYLASE SERPL-CCNC: 32 U/L (ref 20–103)
ANION GAP SERPL CALC-SCNC: 11 MMOL/L (ref 0–11.9)
ANISOCYTOSIS BLD QL SMEAR: ABNORMAL
AST SERPL-CCNC: 107 U/L (ref 12–45)
BASOPHILS # BLD AUTO: 0.8 % (ref 0–1.8)
BASOPHILS # BLD: 0.05 K/UL (ref 0–0.12)
BILIRUB SERPL-MCNC: 4.4 MG/DL (ref 0.1–1.5)
BUN SERPL-MCNC: 27 MG/DL (ref 8–22)
CALCIUM SERPL-MCNC: 8.6 MG/DL (ref 8.5–10.5)
CHLORIDE SERPL-SCNC: 101 MMOL/L (ref 96–112)
CO2 SERPL-SCNC: 24 MMOL/L (ref 20–33)
COMMENT 1642: NORMAL
CREAT SERPL-MCNC: 3.07 MG/DL (ref 0.5–1.4)
CREAT UR-MCNC: 69.6 MG/DL
DACRYOCYTES BLD QL SMEAR: NORMAL
EOSINOPHIL # BLD AUTO: 0.09 K/UL (ref 0–0.51)
EOSINOPHIL NFR BLD: 1.4 % (ref 0–6.9)
ERYTHROCYTE [DISTWIDTH] IN BLOOD BY AUTOMATED COUNT: 47.8 FL (ref 35.9–50)
EST. AVERAGE GLUCOSE BLD GHB EST-MCNC: 100 MG/DL
FASTING STATUS PATIENT QL REPORTED: NORMAL
GLOBULIN SER CALC-MCNC: 2.8 G/DL (ref 1.9–3.5)
GLUCOSE SERPL-MCNC: 102 MG/DL (ref 65–99)
HBA1C MFR BLD: 5.1 % (ref 0–5.6)
HCT VFR BLD AUTO: 33.7 % (ref 42–52)
HGB BLD-MCNC: 9.9 G/DL (ref 14–18)
IMM GRANULOCYTES # BLD AUTO: 0.03 K/UL (ref 0–0.11)
IMM GRANULOCYTES NFR BLD AUTO: 0.5 % (ref 0–0.9)
IRON SATN MFR SERPL: 22 % (ref 15–55)
IRON SERPL-MCNC: 58 UG/DL (ref 50–180)
LIPASE SERPL-CCNC: 30 U/L (ref 11–82)
LYMPHOCYTES # BLD AUTO: 1.42 K/UL (ref 1–4.8)
LYMPHOCYTES NFR BLD: 22.6 % (ref 22–41)
MCH RBC QN AUTO: 22.1 PG (ref 27–33)
MCHC RBC AUTO-ENTMCNC: 29.4 G/DL (ref 33.7–35.3)
MCV RBC AUTO: 75.4 FL (ref 81.4–97.8)
MICROCYTES BLD QL SMEAR: ABNORMAL
MONOCYTES # BLD AUTO: 0.62 K/UL (ref 0–0.85)
MONOCYTES NFR BLD AUTO: 9.9 % (ref 0–13.4)
MORPHOLOGY BLD-IMP: NORMAL
NEUTROPHILS # BLD AUTO: 4.07 K/UL (ref 1.82–7.42)
NEUTROPHILS NFR BLD: 64.8 % (ref 44–72)
NRBC # BLD AUTO: 0.06 K/UL
NRBC BLD-RTO: 1 /100 WBC
PLATELET # BLD AUTO: 139 K/UL (ref 164–446)
PLATELET BLD QL SMEAR: NORMAL
POIKILOCYTOSIS BLD QL SMEAR: NORMAL
POLYCHROMASIA BLD QL SMEAR: NORMAL
POTASSIUM SERPL-SCNC: 4 MMOL/L (ref 3.6–5.5)
PROT SERPL-MCNC: 6.9 G/DL (ref 6–8.2)
PROT UR-MCNC: 41.8 MG/DL (ref 0–15)
PROT/CREAT UR: 601 MG/G (ref 15–68)
RBC # BLD AUTO: 4.47 M/UL (ref 4.7–6.1)
RBC BLD AUTO: PRESENT
SCHISTOCYTES BLD QL SMEAR: NORMAL
SODIUM SERPL-SCNC: 136 MMOL/L (ref 135–145)
T4 FREE SERPL-MCNC: 0.86 NG/DL (ref 0.53–1.43)
TIBC SERPL-MCNC: 262 UG/DL (ref 250–450)
TSH SERPL DL<=0.005 MIU/L-ACNC: 3.02 UIU/ML (ref 0.38–5.33)
URATE SERPL-MCNC: 12.1 MG/DL (ref 2.5–8.3)
VIT B12 SERPL-MCNC: 458 PG/ML (ref 211–911)
WBC # BLD AUTO: 6.3 K/UL (ref 4.8–10.8)

## 2019-07-12 PROCEDURE — 82570 ASSAY OF URINE CREATININE: CPT

## 2019-07-12 PROCEDURE — 82607 VITAMIN B-12: CPT

## 2019-07-12 PROCEDURE — 84550 ASSAY OF BLOOD/URIC ACID: CPT

## 2019-07-12 PROCEDURE — 82306 VITAMIN D 25 HYDROXY: CPT

## 2019-07-12 PROCEDURE — 82150 ASSAY OF AMYLASE: CPT

## 2019-07-12 PROCEDURE — 83540 ASSAY OF IRON: CPT

## 2019-07-12 PROCEDURE — 36415 COLL VENOUS BLD VENIPUNCTURE: CPT

## 2019-07-12 PROCEDURE — 84443 ASSAY THYROID STIM HORMONE: CPT

## 2019-07-12 PROCEDURE — 84156 ASSAY OF PROTEIN URINE: CPT

## 2019-07-12 PROCEDURE — 80053 COMPREHEN METABOLIC PANEL: CPT

## 2019-07-12 PROCEDURE — 85025 COMPLETE CBC W/AUTO DIFF WBC: CPT

## 2019-07-12 PROCEDURE — 83690 ASSAY OF LIPASE: CPT

## 2019-07-12 PROCEDURE — 83550 IRON BINDING TEST: CPT

## 2019-07-12 PROCEDURE — 84439 ASSAY OF FREE THYROXINE: CPT

## 2019-07-12 PROCEDURE — 83036 HEMOGLOBIN GLYCOSYLATED A1C: CPT

## 2019-07-22 ENCOUNTER — HOSPITAL ENCOUNTER (OUTPATIENT)
Dept: LAB | Facility: MEDICAL CENTER | Age: 39
End: 2019-07-22
Attending: INTERNAL MEDICINE
Payer: COMMERCIAL

## 2019-07-22 LAB
ALBUMIN SERPL BCP-MCNC: 4.2 G/DL (ref 3.2–4.9)
BASOPHILS # BLD AUTO: 1 % (ref 0–1.8)
BASOPHILS # BLD: 0.06 K/UL (ref 0–0.12)
BUN SERPL-MCNC: 17 MG/DL (ref 8–22)
CALCIUM SERPL-MCNC: 9.1 MG/DL (ref 8.4–10.2)
CHLORIDE SERPL-SCNC: 102 MMOL/L (ref 96–112)
CO2 SERPL-SCNC: 27 MMOL/L (ref 20–33)
CREAT SERPL-MCNC: 1.19 MG/DL (ref 0.5–1.4)
EOSINOPHIL # BLD AUTO: 0.12 K/UL (ref 0–0.51)
EOSINOPHIL NFR BLD: 2 % (ref 0–6.9)
ERYTHROCYTE [DISTWIDTH] IN BLOOD BY AUTOMATED COUNT: 39.8 FL (ref 35.9–50)
GLUCOSE SERPL-MCNC: 112 MG/DL (ref 65–99)
HBV SURFACE AG SER QL: NEGATIVE
HCT VFR BLD AUTO: 34.1 % (ref 42–52)
HGB BLD-MCNC: 10.5 G/DL (ref 14–18)
IMM GRANULOCYTES # BLD AUTO: 0.03 K/UL (ref 0–0.11)
IMM GRANULOCYTES NFR BLD AUTO: 0.5 % (ref 0–0.9)
LYMPHOCYTES # BLD AUTO: 2.01 K/UL (ref 1–4.8)
LYMPHOCYTES NFR BLD: 32.7 % (ref 22–41)
MCH RBC QN AUTO: 22.2 PG (ref 27–33)
MCHC RBC AUTO-ENTMCNC: 30.8 G/DL (ref 33.7–35.3)
MCV RBC AUTO: 72.1 FL (ref 81.4–97.8)
MONOCYTES # BLD AUTO: 0.5 K/UL (ref 0–0.85)
MONOCYTES NFR BLD AUTO: 8.1 % (ref 0–13.4)
NEUTROPHILS # BLD AUTO: 3.43 K/UL (ref 1.82–7.42)
NEUTROPHILS NFR BLD: 55.7 % (ref 44–72)
NRBC # BLD AUTO: 0.02 K/UL
NRBC BLD-RTO: 0.3 /100 WBC
PHOSPHATE SERPL-MCNC: 3.6 MG/DL (ref 2.5–4.5)
PLATELET # BLD AUTO: 215 K/UL (ref 164–446)
POTASSIUM SERPL-SCNC: 4.7 MMOL/L (ref 3.6–5.5)
RBC # BLD AUTO: 4.73 M/UL (ref 4.7–6.1)
SODIUM SERPL-SCNC: 138 MMOL/L (ref 135–145)
WBC # BLD AUTO: 6.2 K/UL (ref 4.8–10.8)

## 2019-07-22 PROCEDURE — 80069 RENAL FUNCTION PANEL: CPT

## 2019-07-22 PROCEDURE — 85025 COMPLETE CBC W/AUTO DIFF WBC: CPT

## 2019-07-22 PROCEDURE — 86038 ANTINUCLEAR ANTIBODIES: CPT

## 2019-07-22 PROCEDURE — 87340 HEPATITIS B SURFACE AG IA: CPT

## 2019-07-22 PROCEDURE — 36415 COLL VENOUS BLD VENIPUNCTURE: CPT

## 2019-07-23 LAB — NUCLEAR IGG SER QL IA: NORMAL

## 2019-10-14 ENCOUNTER — HOSPITAL ENCOUNTER (OUTPATIENT)
Dept: RADIOLOGY | Facility: MEDICAL CENTER | Age: 39
End: 2019-10-14
Attending: FAMILY MEDICINE
Payer: COMMERCIAL

## 2019-10-14 ENCOUNTER — OFFICE VISIT (OUTPATIENT)
Dept: URGENT CARE | Facility: PHYSICIAN GROUP | Age: 39
End: 2019-10-14
Payer: COMMERCIAL

## 2019-10-14 VITALS
RESPIRATION RATE: 20 BRPM | HEART RATE: 95 BPM | DIASTOLIC BLOOD PRESSURE: 84 MMHG | SYSTOLIC BLOOD PRESSURE: 132 MMHG | BODY MASS INDEX: 32.34 KG/M2 | HEIGHT: 71 IN | TEMPERATURE: 98.8 F | WEIGHT: 231 LBS | OXYGEN SATURATION: 97 %

## 2019-10-14 DIAGNOSIS — S93.491A SPRAIN OF ANTERIOR TALOFIBULAR LIGAMENT OF RIGHT ANKLE, INITIAL ENCOUNTER: ICD-10-CM

## 2019-10-14 PROCEDURE — 73610 X-RAY EXAM OF ANKLE: CPT | Mod: RT

## 2019-10-14 PROCEDURE — 99213 OFFICE O/P EST LOW 20 MIN: CPT | Performed by: FAMILY MEDICINE

## 2019-10-14 RX ORDER — LOSARTAN POTASSIUM 100 MG/1
100 TABLET ORAL DAILY
COMMUNITY
End: 2021-05-25

## 2019-10-14 RX ORDER — FELODIPINE 10 MG/1
10 TABLET, EXTENDED RELEASE ORAL DAILY
COMMUNITY
End: 2021-06-01

## 2019-10-14 RX ORDER — IBUPROFEN 200 MG
200 TABLET ORAL EVERY 6 HOURS PRN
COMMUNITY
End: 2022-07-20

## 2019-10-14 ASSESSMENT — ENCOUNTER SYMPTOMS
NUMBNESS: 0
LOSS OF SENSATION: 0
TINGLING: 0
INABILITY TO BEAR WEIGHT: 0
MUSCLE WEAKNESS: 0
LOSS OF MOTION: 0

## 2019-10-14 NOTE — PROGRESS NOTES
"Subjective:   River Olson is a 39 y.o. male who presents for Ankle Injury (twisted R ankle  x 5 days)     Ankle Injury    The incident occurred 3 to 5 days ago. The incident occurred in the yard. The injury mechanism was a fall. The pain is present in the right ankle. The quality of the pain is described as aching. The pain is severe. The pain has been constant since onset. Pertinent negatives include no inability to bear weight, loss of motion, loss of sensation, muscle weakness, numbness or tingling.     Review of Systems   Neurological: Negative for tingling and numbness.      Objective:   /84 (BP Location: Left arm, Patient Position: Sitting, BP Cuff Size: Adult)   Pulse 95   Temp 37.1 °C (98.8 °F) (Temporal)   Resp 20   Ht 1.803 m (5' 11\")   Wt 104.8 kg (231 lb)   SpO2 97%   BMI 32.22 kg/m²   Physical Exam   Constitutional: He is oriented to person, place, and time. He appears well-developed and well-nourished. No distress.   HENT:   Head: Normocephalic and atraumatic.   Eyes: Pupils are equal, round, and reactive to light. Conjunctivae and EOM are normal.   Cardiovascular: Normal rate and regular rhythm.   No murmur heard.  Pulmonary/Chest: Effort normal and breath sounds normal. No respiratory distress.   Abdominal: Soft. He exhibits no distension. There is no tenderness.   Musculoskeletal:        Right ankle: He exhibits decreased range of motion, swelling and ecchymosis. He exhibits no deformity. Tenderness. Lateral malleolus and AITFL tenderness found.   Neurological: He is alert and oriented to person, place, and time. He has normal reflexes. No sensory deficit.   Skin: Skin is warm and dry.   Psychiatric: He has a normal mood and affect.        Assessment/Plan:   1. Sprain of anterior talofibular ligament of right ankle, initial encounter  - DX-ANKLE 3+ VIEWS RIGHT; Future    Other orders  - felodipine (PLENDIL) 10 MG TABLET SR 24 HR; Take 10 mg by mouth every day.  - losartan (COZAAR) 100 " MG Tab; Take 100 mg by mouth every day.  - ibuprofen (MOTRIN) 200 MG Tab; Take 200 mg by mouth every 6 hours as needed.    Differential diagnosis, natural history, supportive care, and indications for immediate follow-up discussed.  Use over-the-counter pain reliever, such as acetaminophen (Tylenol), ibuprofen (Advil, Motrin) or naproxen (Aleve) as needed; follow package directions for dosing.

## 2021-03-04 ENCOUNTER — HOSPITAL ENCOUNTER (OUTPATIENT)
Dept: RADIOLOGY | Facility: MEDICAL CENTER | Age: 41
End: 2021-03-04
Attending: FAMILY MEDICINE
Payer: COMMERCIAL

## 2021-03-04 ENCOUNTER — OFFICE VISIT (OUTPATIENT)
Dept: URGENT CARE | Facility: PHYSICIAN GROUP | Age: 41
End: 2021-03-04
Payer: COMMERCIAL

## 2021-03-04 ENCOUNTER — HOSPITAL ENCOUNTER (OUTPATIENT)
Facility: MEDICAL CENTER | Age: 41
End: 2021-03-04
Attending: FAMILY MEDICINE
Payer: COMMERCIAL

## 2021-03-04 VITALS
SYSTOLIC BLOOD PRESSURE: 162 MMHG | RESPIRATION RATE: 20 BRPM | HEIGHT: 71 IN | OXYGEN SATURATION: 92 % | HEART RATE: 101 BPM | BODY MASS INDEX: 32.9 KG/M2 | WEIGHT: 235 LBS | TEMPERATURE: 97.6 F | DIASTOLIC BLOOD PRESSURE: 82 MMHG

## 2021-03-04 DIAGNOSIS — J45.31 MILD PERSISTENT ASTHMA WITH EXACERBATION: ICD-10-CM

## 2021-03-04 DIAGNOSIS — R05.9 COUGH: ICD-10-CM

## 2021-03-04 DIAGNOSIS — R03.0 ELEVATED BLOOD PRESSURE READING: ICD-10-CM

## 2021-03-04 PROCEDURE — 99214 OFFICE O/P EST MOD 30 MIN: CPT | Performed by: FAMILY MEDICINE

## 2021-03-04 PROCEDURE — U0003 INFECTIOUS AGENT DETECTION BY NUCLEIC ACID (DNA OR RNA); SEVERE ACUTE RESPIRATORY SYNDROME CORONAVIRUS 2 (SARS-COV-2) (CORONAVIRUS DISEASE [COVID-19]), AMPLIFIED PROBE TECHNIQUE, MAKING USE OF HIGH THROUGHPUT TECHNOLOGIES AS DESCRIBED BY CMS-2020-01-R: HCPCS

## 2021-03-04 PROCEDURE — 71046 X-RAY EXAM CHEST 2 VIEWS: CPT

## 2021-03-04 PROCEDURE — U0005 INFEC AGEN DETEC AMPLI PROBE: HCPCS

## 2021-03-04 RX ORDER — DOXYCYCLINE HYCLATE 100 MG
100 TABLET ORAL 2 TIMES DAILY
Qty: 10 TABLET | Refills: 0 | Status: SHIPPED | OUTPATIENT
Start: 2021-03-04 | End: 2021-03-09

## 2021-03-04 RX ORDER — PREDNISONE 20 MG/1
40 TABLET ORAL DAILY
Qty: 10 TABLET | Refills: 0 | Status: SHIPPED | OUTPATIENT
Start: 2021-03-04 | End: 2021-03-09

## 2021-03-04 RX ORDER — ALBUTEROL SULFATE 90 UG/1
2 AEROSOL, METERED RESPIRATORY (INHALATION) EVERY 4 HOURS PRN
Qty: 1 EACH | Refills: 0 | Status: SHIPPED | OUTPATIENT
Start: 2021-03-04 | End: 2023-08-08

## 2021-03-04 RX ORDER — PROMETHAZINE HYDROCHLORIDE AND CODEINE PHOSPHATE 6.25; 1 MG/5ML; MG/5ML
5 SYRUP ORAL 4 TIMES DAILY PRN
Qty: 120 ML | Refills: 0 | Status: SHIPPED | OUTPATIENT
Start: 2021-03-04 | End: 2021-03-11

## 2021-03-04 ASSESSMENT — FIBROSIS 4 INDEX: FIB4 SCORE: 1.8

## 2021-03-04 ASSESSMENT — ENCOUNTER SYMPTOMS
EYE DISCHARGE: 0
WEIGHT LOSS: 0
EYE REDNESS: 0
NAUSEA: 0
MYALGIAS: 0
VOMITING: 0

## 2021-03-05 DIAGNOSIS — R05.9 COUGH: ICD-10-CM

## 2021-03-05 DIAGNOSIS — J45.31 MILD PERSISTENT ASTHMA WITH EXACERBATION: ICD-10-CM

## 2021-03-05 LAB
COVID ORDER STATUS COVID19: NORMAL
SARS-COV-2 RNA RESP QL NAA+PROBE: NOTDETECTED
SPECIMEN SOURCE: NORMAL

## 2021-03-05 NOTE — PROGRESS NOTES
"Subjective:      River Olson is a 40 y.o. male who presents with Congestion (chest congestion (1x week; wheexzing cough))            1 week productive cough without blood in sputum. No fever. SOB/wheeze. +PMH asthma. No PMH pneumonia. +nasal congestion. Loss of smell. No HA. No myalgia. OTC dayquil. No other aggravating or alleviating factors.        Review of Systems   Constitutional: Negative for malaise/fatigue and weight loss.   Eyes: Negative for discharge and redness.   Gastrointestinal: Negative for nausea and vomiting.   Musculoskeletal: Negative for joint pain and myalgias.   Skin: Negative for itching and rash.   .  Medications, Allergies, and current problem list reviewed today in Epic         Objective:     BP (!) 162/82 (BP Location: Left arm, Patient Position: Sitting, BP Cuff Size: Adult)   Pulse (!) 101   Temp 36.4 °C (97.6 °F) (Temporal)   Resp 20   Ht 1.803 m (5' 11\")   Wt 107 kg (235 lb)   SpO2 92%   BMI 32.78 kg/m²      Physical Exam  Constitutional:       General: He is not in acute distress.     Appearance: He is well-developed.   HENT:      Head: Normocephalic and atraumatic.   Eyes:      Conjunctiva/sclera: Conjunctivae normal.   Cardiovascular:      Rate and Rhythm: Normal rate and regular rhythm.      Heart sounds: Normal heart sounds. No murmur.   Pulmonary:      Effort: Pulmonary effort is normal.      Breath sounds: Wheezing and rhonchi present.   Skin:     General: Skin is warm and dry.      Findings: No rash.   Neurological:      Mental Status: He is alert and oriented to person, place, and time.                 Assessment/Plan:       CXR: no acute cardiopulmonary process per radiology    1. Elevated blood pressure reading     2. Cough  DX-CHEST-2 VIEWS    COVID/SARS CoV-2 PCR    promethazine-codeine (PHENERGAN-CODEINE) 6.25-10 MG/5ML Syrup    doxycycline (VIBRAMYCIN) 100 MG Tab   3. Mild persistent asthma with exacerbation  DX-CHEST-2 VIEWS    COVID/SARS CoV-2 PCR    " predniSONE (DELTASONE) 20 MG Tab    albuterol 108 (90 Base) MCG/ACT Aero Soln inhalation aerosol     Differential diagnosis, natural history, supportive care, and indications for immediate follow-up discussed at length.     Contingent antibiotic prescription given to patient to fill upon meeting criteria of guidelines discussed.     Home blood pressure monitoring and follow-up with primary care.

## 2021-05-25 ENCOUNTER — OFFICE VISIT (OUTPATIENT)
Dept: URGENT CARE | Facility: CLINIC | Age: 41
End: 2021-05-25
Payer: COMMERCIAL

## 2021-05-25 VITALS
HEIGHT: 71 IN | RESPIRATION RATE: 18 BRPM | WEIGHT: 253 LBS | TEMPERATURE: 99.5 F | HEART RATE: 116 BPM | BODY MASS INDEX: 35.42 KG/M2 | OXYGEN SATURATION: 96 % | DIASTOLIC BLOOD PRESSURE: 96 MMHG | SYSTOLIC BLOOD PRESSURE: 162 MMHG

## 2021-05-25 DIAGNOSIS — M70.22 OLECRANON BURSITIS OF LEFT ELBOW: ICD-10-CM

## 2021-05-25 DIAGNOSIS — I10 ESSENTIAL HYPERTENSION: ICD-10-CM

## 2021-05-25 PROCEDURE — 99214 OFFICE O/P EST MOD 30 MIN: CPT | Performed by: PHYSICIAN ASSISTANT

## 2021-05-25 RX ORDER — LOSARTAN POTASSIUM 100 MG/1
100 TABLET ORAL DAILY
Qty: 30 TABLET | Refills: 0 | Status: SHIPPED | OUTPATIENT
Start: 2021-05-25 | End: 2021-06-28

## 2021-05-25 RX ORDER — CEPHALEXIN 500 MG/1
500 CAPSULE ORAL 4 TIMES DAILY
Qty: 28 CAPSULE | Refills: 0 | Status: SHIPPED | OUTPATIENT
Start: 2021-05-25 | End: 2021-06-01 | Stop reason: SDUPTHER

## 2021-05-25 RX ORDER — SULFAMETHOXAZOLE AND TRIMETHOPRIM 800; 160 MG/1; MG/1
1 TABLET ORAL 2 TIMES DAILY
Qty: 14 TABLET | Refills: 0 | Status: SHIPPED | OUTPATIENT
Start: 2021-05-25 | End: 2021-06-01 | Stop reason: SDUPTHER

## 2021-05-25 ASSESSMENT — ENCOUNTER SYMPTOMS
SORE THROAT: 0
VOMITING: 0
SHORTNESS OF BREATH: 0
FEVER: 1
HEADACHES: 0
EYE DISCHARGE: 0
COUGH: 0
NAUSEA: 0
EYE REDNESS: 0

## 2021-05-25 ASSESSMENT — FIBROSIS 4 INDEX: FIB4 SCORE: 1.8

## 2021-05-26 ENCOUNTER — TELEPHONE (OUTPATIENT)
Dept: SCHEDULING | Facility: IMAGING CENTER | Age: 41
End: 2021-05-26

## 2021-05-26 NOTE — PROGRESS NOTES
Subjective:      River Olson is a 40 y.o. male who presents with Elbow Pain ([id number], left elbow pain and swelling)            HPI  This is a new problem.  The patient presents to clinic complaining of left elbow pain and swelling x10 days.  The patient reports no recent injury or trauma to his left elbow.  The patient states the pain and swelling to his left elbow waxes and wanes.  The patient reports no associated redness or increased warmth.  The patient states he developed a recent fever x2 days ago with a max temp of 101.  The patient states his fever is now resolved.  The patient is taken OTC ibuprofen for his current symptoms.  He reports no history of same.      PMH:  has a past medical history of ASTHMA, Hypertension (2014), Infectious disease (age 6), and Thalassanemia (1979). He also has no past medical history of Diabetes or Type II or unspecified type diabetes mellitus without mention of complication, not stated as uncontrolled.  MEDS:   Current Outpatient Medications:   •  albuterol 108 (90 Base) MCG/ACT Aero Soln inhalation aerosol, Inhale 2 Puffs every four hours as needed for Shortness of Breath. (Patient not taking: Reported on 5/25/2021), Disp: 1 Each, Rfl: 0  •  felodipine (PLENDIL) 10 MG TABLET SR 24 HR, Take 10 mg by mouth every day. (Patient not taking: Reported on 5/25/2021), Disp: , Rfl:   •  losartan (COZAAR) 100 MG Tab, Take 100 mg by mouth every day. (Patient not taking: Reported on 5/25/2021), Disp: , Rfl:   •  ibuprofen (MOTRIN) 200 MG Tab, Take 200 mg by mouth every 6 hours as needed. (Patient not taking: Reported on 5/25/2021), Disp: , Rfl:   •  guaiFENesin LA (MUCINEX) 600 MG TABLET SR 12 HR, Take 600 mg by mouth every 12 hours. (Patient not taking: Reported on 5/25/2021), Disp: , Rfl:   •  DiphenhydrAMINE HCl (ZZZQUIL) 50 MG/30ML Liquid, Take  by mouth. (Patient not taking: Reported on 5/25/2021), Disp: , Rfl:   •  Pseudoephedrine-APAP-DM (DAYQUIL PO), Take  by mouth. (Patient not  "taking: Reported on 5/25/2021), Disp: , Rfl:   ALLERGIES: No Known Allergies  SURGHX:   Past Surgical History:   Procedure Laterality Date   • OTHER  greater than 10 years    gall bladder removed     SOCHX:  reports that he quit smoking about 3 years ago. His smoking use included cigarettes. He started smoking about 20 years ago. He has a 4.25 pack-year smoking history. His smokeless tobacco use includes chew. He reports current alcohol use. He reports that he does not use drugs.  FH: Family history was reviewed, no pertinent findings to report      Review of Systems   Constitutional: Positive for fever.   HENT: Negative for congestion, ear pain and sore throat.    Eyes: Negative for discharge and redness.   Respiratory: Negative for cough and shortness of breath.    Cardiovascular: Negative for chest pain and leg swelling.   Gastrointestinal: Negative for nausea and vomiting.   Musculoskeletal: Positive for joint pain (left elbow pain and swelling).   Skin: Negative for rash.   Neurological: Negative for headaches.          Objective:     BP (!) 162/96 (BP Location: Left arm, Patient Position: Sitting, BP Cuff Size: Adult)   Pulse (!) 116   Temp 37.5 °C (99.5 °F) (Temporal)   Resp 18   Ht 1.803 m (5' 11\")   Wt 115 kg (253 lb)   SpO2 96%   BMI 35.29 kg/m²      Physical Exam  Constitutional:       General: He is not in acute distress.     Appearance: Normal appearance. He is well-developed. He is not ill-appearing.   HENT:      Head: Normocephalic and atraumatic.      Right Ear: External ear normal.      Left Ear: External ear normal.   Eyes:      Extraocular Movements: Extraocular movements intact.      Conjunctiva/sclera: Conjunctivae normal.   Cardiovascular:      Rate and Rhythm: Tachycardia present.   Pulmonary:      Effort: Pulmonary effort is normal.   Musculoskeletal:      Cervical back: Normal range of motion and neck supple.      Comments:   Left Elbow:   Tenderness overlying the olecranon process " with localized swelling, consistent with bursitis.  Trace erythema to the left elbow with slight increased warmth.  No signs of lymphangitis.  No open wounds/lesions.  No discharge/weeping.  ROM intact -the patient demonstrates full active range of motion of the left elbow  Neurovascular intact distally  Strength 5/5 -flexion/extension of the left elbow against resistance   Skin:     General: Skin is warm and dry.   Neurological:      General: No focal deficit present.      Mental Status: He is alert and oriented to person, place, and time.              Progress:  Recheck:   HR: 110     Applied an Ace wrap to the left elbow to provide mild compression.     Assessment/Plan:          1. Olecranon bursitis of left elbow  - sulfamethoxazole-trimethoprim (BACTRIM DS) 800-160 MG tablet; Take 1 tablet by mouth 2 times a day for 7 days.  Dispense: 14 tablet; Refill: 0  - cephALEXin (KEFLEX) 500 MG Cap; Take 1 capsule by mouth 4 times a day for 7 days.  Dispense: 28 capsule; Refill: 0    The patient's presenting symptoms and physical exam findings are consistent with prolonged bursitis of the left elbow.  On physical exam, the patient had tenderness overlying the ulnar acromion process with localized swelling, consistent with acute bursitis.  Trace erythema was present to the left elbow with slight increased warmth.  No signs of lymphangitis were appreciated.  No open wounds/lesions or discharge/weeping was noted.  The patient demonstrates full active range range of motion of the left elbow, as well as full and equal strength.  Based on the patient's presenting symptoms and physical exam findings, it is likely the patient's left elbow pain and swelling is related to acute bursitis.  Given the patient's recent fever, as well as the associated erythema and slight increased warmth to the left elbow appreciated on exam, and concerned about possible infectious bursitis.  The patient is currently not experiencing any signs or  symptoms worrisome for septic arthritis.  Will prescribe the patient Keflex and Bactrim for a presumed infectious bursitis.  Applied an Ace wrap to the left elbow to provide mild compression.  Advised patient to monitor for worsening signs and her symptoms.  Recommend OTC medications and supportive care for symptomatic management.  Recommend the patient follow-up with PCP as needed.  Discussed STRICT ED precautions with the patient, and he verbalized understanding.    Differential diagnoses, supportive care, and indications for immediate follow-up discussed with patient.   Instructed to return to clinic or nearest emergency department for any change in condition, further concerns, or worsening of symptoms.    OTC Tylenol or Motrin for fever/discomfort.  RICE  Monitor worsening signs of infection  Follow-up with PCP  Return to clinic or go to the ED if symptoms worsen or fail to improve, or if the patient should have worsening/increasing/persistent pain to the left elbow, swelling, increased redness or warmth, signs of lymphangitis, decreased range of motion, fever/chills, secondary signs of infection, and/or any concerning symptoms.    2. Essential hypertension  - AMB REFERRAL TO ESTABLISH WITH [facility]  - losartan (COZAAR) 100 MG Tab; Take 1 tablet by mouth every day.  Dispense: 30 tablet; Refill: 0  - Comp Metabolic Panel; Future    The patient had an elevated blood pressure reading today in clinic at 162/96.  The patient states this is his normal blood pressure.  The patient reports a history of hypertension, but states he has been off of his high blood pressure medications for the past 5 months.  The patient was previously prescribed Losartan 100 mg daily.  The patient reports no associated symptoms related to his elevated blood pressure.  He reports no associated chest pain, shortness of breath, headache, vision changes, nausea/vomiting, numbness, tingling, weakness of the extremities, or lower extremity  swelling.  The patient is interested in restarting his high blood pressure medication at this time.  Will re-prescribe the patient Losartan 100 mg daily, as this is what he was previously taking for his high blood pressure.  Will order a CMP to evaluate the patient's renal function since we are restarting his antihypertensive medication at this time.  Will place an urgent referral to [facility] for management of the patient's hypertension.  Advised the patient to monitor his blood pressure at home and record a blood pressure log.  Recommend the patient follow-up with primary care as indicated above.  Discussed strict ED precautions with the patient, and he verbalized understanding.    Differential diagnoses, supportive care, and indications for immediate follow-up discussed with patient.   Instructed to return to clinic or nearest emergency department for any change in condition, further concerns, or worsening of symptoms.    Losartan as prescribed  Monitor blood pressure at home  Record blood pressure log  Referral to primary care  Return to clinic or go to the ED if symptoms worsen or fail to improve, or if the patient develop worsening/increasing/persistent elevated blood pressure, headache, vision changes, nausea/vomiting, numbness, tingling, weakness of the extremities, chest pain, shortness of breath, lower extremity swelling, fever/chills, and/or any concerning symptoms.    Discussed plan with the patient, and he agrees to the above.    I personally reviewed prior external notes and test results pertinent to today's visit.  I have independently reviewed and interpreted all diagnostics ordered during this urgent care visit.     Time spent evaluating this patient was at least 30 minutes and includes preparing for visit, obtaining history, exam and evaluation, ordering labs/tests/procedures/medications, independent interpretation, and counseling/education.    Please note that this dictation was created  using voice recognition software. I have made every reasonable attempt to correct obvious errors, but I expect that there may be errors of grammar and possibly content that I did not discover before finalizing the note.     This note was electronically signed by Cheyanne Webster PA-C

## 2021-05-27 ENCOUNTER — HOSPITAL ENCOUNTER (OUTPATIENT)
Dept: LAB | Facility: MEDICAL CENTER | Age: 41
End: 2021-05-27
Attending: PHYSICIAN ASSISTANT
Payer: COMMERCIAL

## 2021-05-27 DIAGNOSIS — I10 ESSENTIAL HYPERTENSION: ICD-10-CM

## 2021-05-27 LAB
ALBUMIN SERPL BCP-MCNC: 4.2 G/DL (ref 3.2–4.9)
ALBUMIN/GLOB SERPL: 1.4 G/DL
ALP SERPL-CCNC: 66 U/L (ref 30–99)
ALT SERPL-CCNC: 107 U/L (ref 2–50)
ANION GAP SERPL CALC-SCNC: 15 MMOL/L (ref 7–16)
AST SERPL-CCNC: 139 U/L (ref 12–45)
BILIRUB SERPL-MCNC: 2.2 MG/DL (ref 0.1–1.5)
BUN SERPL-MCNC: 9 MG/DL (ref 8–22)
CALCIUM SERPL-MCNC: 8.7 MG/DL (ref 8.5–10.5)
CHLORIDE SERPL-SCNC: 99 MMOL/L (ref 96–112)
CO2 SERPL-SCNC: 21 MMOL/L (ref 20–33)
CREAT SERPL-MCNC: 0.82 MG/DL (ref 0.5–1.4)
FASTING STATUS PATIENT QL REPORTED: NORMAL
GLOBULIN SER CALC-MCNC: 3.1 G/DL (ref 1.9–3.5)
GLUCOSE SERPL-MCNC: 129 MG/DL (ref 65–99)
POTASSIUM SERPL-SCNC: 3.7 MMOL/L (ref 3.6–5.5)
PROT SERPL-MCNC: 7.3 G/DL (ref 6–8.2)
SODIUM SERPL-SCNC: 135 MMOL/L (ref 135–145)

## 2021-05-27 PROCEDURE — 36415 COLL VENOUS BLD VENIPUNCTURE: CPT

## 2021-05-27 PROCEDURE — 80053 COMPREHEN METABOLIC PANEL: CPT

## 2021-05-28 ENCOUNTER — TELEPHONE (OUTPATIENT)
Dept: URGENT CARE | Facility: CLINIC | Age: 41
End: 2021-05-28

## 2021-05-28 NOTE — TELEPHONE ENCOUNTER
5/28/2021 @ 3:32PM    Attempted to call the patient with results of his CMP.  The patient did not answer.  Left a voice message informing the patient that his kidney function appeared within normal limits.  I recommend the patient start the prescribed Losartan at this time.  Advised the patient to return my call with any questions or concerns.  Recommend the patient follow-up with primary care as indicated at his clinic visit.

## 2021-06-01 ENCOUNTER — OFFICE VISIT (OUTPATIENT)
Dept: MEDICAL GROUP | Facility: PHYSICIAN GROUP | Age: 41
End: 2021-06-01
Payer: COMMERCIAL

## 2021-06-01 VITALS
BODY MASS INDEX: 35.22 KG/M2 | SYSTOLIC BLOOD PRESSURE: 164 MMHG | TEMPERATURE: 97.7 F | WEIGHT: 251.6 LBS | HEART RATE: 119 BPM | OXYGEN SATURATION: 99 % | HEIGHT: 71 IN | RESPIRATION RATE: 18 BRPM | DIASTOLIC BLOOD PRESSURE: 96 MMHG

## 2021-06-01 DIAGNOSIS — M70.22 OLECRANON BURSITIS OF LEFT ELBOW: ICD-10-CM

## 2021-06-01 DIAGNOSIS — Z86.2 H/O THALASSEMIA MINOR: ICD-10-CM

## 2021-06-01 DIAGNOSIS — R73.9 HYPERGLYCEMIA: ICD-10-CM

## 2021-06-01 DIAGNOSIS — R06.83 SNORES: ICD-10-CM

## 2021-06-01 DIAGNOSIS — I10 ESSENTIAL HYPERTENSION, BENIGN: ICD-10-CM

## 2021-06-01 DIAGNOSIS — R74.8 ABNORMAL LIVER ENZYMES: ICD-10-CM

## 2021-06-01 PROBLEM — T14.90XA TRAUMA: Status: RESOLVED | Noted: 2017-11-28 | Resolved: 2021-06-01

## 2021-06-01 PROBLEM — S36.039A LACERATION OF SPLEEN: Status: RESOLVED | Noted: 2017-11-28 | Resolved: 2021-06-01

## 2021-06-01 PROCEDURE — 99214 OFFICE O/P EST MOD 30 MIN: CPT | Performed by: FAMILY MEDICINE

## 2021-06-01 RX ORDER — SULFAMETHOXAZOLE AND TRIMETHOPRIM 800; 160 MG/1; MG/1
1 TABLET ORAL 2 TIMES DAILY
Qty: 6 TABLET | Refills: 0 | Status: SHIPPED | OUTPATIENT
Start: 2021-06-01 | End: 2021-06-04

## 2021-06-01 RX ORDER — CEPHALEXIN 500 MG/1
500 CAPSULE ORAL 4 TIMES DAILY
Qty: 12 CAPSULE | Refills: 0 | Status: SHIPPED | OUTPATIENT
Start: 2021-06-01 | End: 2021-06-04

## 2021-06-01 RX ORDER — HYDROCHLOROTHIAZIDE 12.5 MG/1
12.5 CAPSULE, GELATIN COATED ORAL DAILY
Qty: 30 CAPSULE | Refills: 0 | Status: SHIPPED | OUTPATIENT
Start: 2021-06-01 | End: 2021-06-28

## 2021-06-01 ASSESSMENT — FIBROSIS 4 INDEX: FIB4 SCORE: 2.56

## 2021-06-01 ASSESSMENT — PATIENT HEALTH QUESTIONNAIRE - PHQ9: CLINICAL INTERPRETATION OF PHQ2 SCORE: 0

## 2021-06-01 NOTE — PROGRESS NOTES
Subjective:     CC:   Chief Complaint   Patient presents with   • Establish Care       HPI:   River presents today to establish care.  Patient was seen in urgent care infection to his left elbow he was running fever with this.  Patient has been on almost 7 days of antibiotics area still hurts at times but patient has no problems moving his elbow.  Patient also has elevated blood pressures he may not have been taking his losartan.  Patient also admits to drinking alcohol he has decrease his drinking.  Subjective:     CC:   Chief Complaint   Patient presents with   • Establish Care       Past Medical History:   Diagnosis Date   • ASTHMA    • Hypertension 2014   • Infectious disease age 6    chicken pox   • Thalassanemia 1979       Social History     Tobacco Use   • Smoking status: Former Smoker     Packs/day: 0.25     Years: 17.00     Pack years: 4.25     Types: Cigarettes     Start date: 10/10/2000     Quit date: 10/10/2017     Years since quitting: 3.6   • Smokeless tobacco: Current User     Types: Chew   • Tobacco comment: quit 4/2011   Vaping Use   • Vaping Use: Never used   Substance Use Topics   • Alcohol use: Yes     Comment: 2 shoots per day liquer vodka   • Drug use: No       Current Outpatient Medications Ordered in Epic   Medication Sig Dispense Refill   • hydrochlorothiazide (MICROZIDE) 12.5 MG capsule Take 1 capsule by mouth every day. 30 capsule 0   • cephALEXin (KEFLEX) 500 MG Cap Take 1 capsule by mouth 4 times a day for 3 days. 12 capsule 0   • sulfamethoxazole-trimethoprim (BACTRIM DS) 800-160 MG tablet Take 1 tablet by mouth 2 times a day for 3 days. 6 tablet 0   • losartan (COZAAR) 100 MG Tab Take 1 tablet by mouth every day. 30 tablet 0   • albuterol 108 (90 Base) MCG/ACT Aero Soln inhalation aerosol Inhale 2 Puffs every four hours as needed for Shortness of Breath. 1 Each 0   • ibuprofen (MOTRIN) 200 MG Tab Take 200 mg by mouth every 6 hours as needed.       No current Epic-ordered  "facility-administered medications on file.       Allergies:  Patient has no known allergies.    Health Maintenance: Completed    ROS:  Gen: no fevers/chills, no changes in weight  Eyes: no changes in vision  ENT: no sore throat, no hearing loss, no bloody nose  Pulm: no sob, no cough, states he does snore loudly at night  CV: no chest pain, no palpitations  GI: no nausea/vomiting, patient denies any black or bloody stools  : no dysuria  MSk: no myalgias  Skin: no rash  Neuro: no headaches, no numbness/tingling  Heme/Lymph: no easy bruising    Objective:     Exam:  BP (!) 164/96   Pulse (!) 119   Temp 36.5 °C (97.7 °F)   Resp 18   Ht 1.803 m (5' 11\")   Wt 114 kg (251 lb 9.6 oz)   SpO2 99%   BMI 35.09 kg/m²  Body mass index is 35.09 kg/m².    Gen: Alert and oriented, No apparent distress.  Skin: Warm and dry.  No obvious lesions.  Eyes: Sclera wnl Pupils normal in size  Lungs: Normal effort, CTA bilaterally, no wheezes, rhonchi, or rales  CV: Regular rate and rhythm. No murmurs, rubs, or gallops.  ABD: Soft non-tender no organomegaly  Musculoskeletal: Normal gait.  On examination of patient's left elbow he does have what appears to be bursitis he has good range of motion with no discomfort at all  Neuro: Oriented to person, place and time  Psych: Mood is wnl       Labs: Ordered lab work patient to get this done fasting patient given a listing of all the [facility] labs he can go to    Assessment & Plan:     40 y.o. male with the following -     1. Essential hypertension, benign  Patient was on a old calcium channel blocker along with losartan will add instead a diuretic.  Patient to follow-up with me in 1 week this is a chronic problem  - CBC WITH DIFFERENTIAL; Future  - Comp Metabolic Panel; Future  - Lipid Profile; Future  - TSH; Future  - FREE THYROXINE; Future    2. Abnormal liver enzymes  We will recheck his lab work on concern over his liver test patient states he has cut down on his alcohol intake this is " a chronic problem.  Patient is not interested in seeking help at this time he thinks he can do it on his own.  - Comp Metabolic Panel; Future  - HEPATITIS PANEL ACUTE(4 COMPONENTS); Future  - LIPASE; Future  - Prothrombin Time; Future  - APTT; Future    3. H/O thalassemia minor  - CBC WITH DIFFERENTIAL; Future    4. Olecranon bursitis of left elbow  We will extend his antibiotics for 3 more days we will have him refer to Ortho patient was very agreeable with this plan this is a acute problem  - cephALEXin (KEFLEX) 500 MG Cap; Take 1 capsule by mouth 4 times a day for 3 days.  Dispense: 12 capsule; Refill: 0  - sulfamethoxazole-trimethoprim (BACTRIM DS) 800-160 MG tablet; Take 1 tablet by mouth 2 times a day for 3 days.  Dispense: 6 tablet; Refill: 0  - URIC ACID; Future    5. Hyperglycemia  His last glucose was elevated this was nonfasting we will just go ahead and do a hemoglobin A1c this is a acute problem  - CBC WITH DIFFERENTIAL; Future  - HEMOGLOBIN A1C; Future    6. Snores  We will write a referral to sleep clinic this is a chronic problem  - REFERRAL TO PULMONARY AND SLEEP MEDICINE    Other orders  - hydrochlorothiazide (MICROZIDE) 12.5 MG capsule; Take 1 capsule by mouth every day.  Dispense: 30 capsule; Refill: 0       Return in about 1 week (around 6/8/2021).    Please note that this dictation was created using voice recognition software. I have made every reasonable attempt to correct obvious errors, but I expect that there are errors of grammar and possibly content that I did not discover before finalizing the note.

## 2021-06-04 ENCOUNTER — HOSPITAL ENCOUNTER (OUTPATIENT)
Dept: LAB | Facility: MEDICAL CENTER | Age: 41
End: 2021-06-04
Attending: FAMILY MEDICINE
Payer: COMMERCIAL

## 2021-06-04 DIAGNOSIS — R73.9 HYPERGLYCEMIA: ICD-10-CM

## 2021-06-04 DIAGNOSIS — I10 ESSENTIAL HYPERTENSION, BENIGN: ICD-10-CM

## 2021-06-04 DIAGNOSIS — R74.8 ABNORMAL LIVER ENZYMES: ICD-10-CM

## 2021-06-04 DIAGNOSIS — M70.22 OLECRANON BURSITIS OF LEFT ELBOW: ICD-10-CM

## 2021-06-04 DIAGNOSIS — Z86.2 H/O THALASSEMIA MINOR: ICD-10-CM

## 2021-06-04 LAB
ALBUMIN SERPL BCP-MCNC: 4.7 G/DL (ref 3.2–4.9)
ALBUMIN/GLOB SERPL: 1.5 G/DL
ALP SERPL-CCNC: 71 U/L (ref 30–99)
ALT SERPL-CCNC: 172 U/L (ref 2–50)
ANION GAP SERPL CALC-SCNC: 12 MMOL/L (ref 7–16)
AST SERPL-CCNC: 163 U/L (ref 12–45)
BASOPHILS # BLD AUTO: 1.2 % (ref 0–1.8)
BASOPHILS # BLD: 0.07 K/UL (ref 0–0.12)
BILIRUB SERPL-MCNC: 3.3 MG/DL (ref 0.1–1.5)
BUN SERPL-MCNC: 15 MG/DL (ref 8–22)
CALCIUM SERPL-MCNC: 9.3 MG/DL (ref 8.5–10.5)
CHLORIDE SERPL-SCNC: 98 MMOL/L (ref 96–112)
CHOLEST SERPL-MCNC: 140 MG/DL (ref 100–199)
CO2 SERPL-SCNC: 24 MMOL/L (ref 20–33)
COMMENT 1642: NORMAL
CREAT SERPL-MCNC: 1.06 MG/DL (ref 0.5–1.4)
DACRYOCYTES BLD QL SMEAR: NORMAL
EOSINOPHIL # BLD AUTO: 0.15 K/UL (ref 0–0.51)
EOSINOPHIL NFR BLD: 2.5 % (ref 0–6.9)
ERYTHROCYTE [DISTWIDTH] IN BLOOD BY AUTOMATED COUNT: 44.5 FL (ref 35.9–50)
EST. AVERAGE GLUCOSE BLD GHB EST-MCNC: 103 MG/DL
FASTING STATUS PATIENT QL REPORTED: NORMAL
GLOBULIN SER CALC-MCNC: 3.2 G/DL (ref 1.9–3.5)
GLUCOSE SERPL-MCNC: 120 MG/DL (ref 65–99)
HAV IGM SERPL QL IA: NORMAL
HBA1C MFR BLD: 5.2 % (ref 4–5.6)
HBV CORE IGM SER QL: NORMAL
HBV SURFACE AG SER QL: NORMAL
HCT VFR BLD AUTO: 39.4 % (ref 42–52)
HCV AB SER QL: NORMAL
HDLC SERPL-MCNC: 37 MG/DL
HGB BLD-MCNC: 11.8 G/DL (ref 14–18)
IMM GRANULOCYTES # BLD AUTO: 0.04 K/UL (ref 0–0.11)
IMM GRANULOCYTES NFR BLD AUTO: 0.7 % (ref 0–0.9)
INR PPP: 1.15 (ref 0.87–1.13)
LDLC SERPL CALC-MCNC: 74 MG/DL
LIPASE SERPL-CCNC: 33 U/L (ref 11–82)
LYMPHOCYTES # BLD AUTO: 1.72 K/UL (ref 1–4.8)
LYMPHOCYTES NFR BLD: 29.2 % (ref 22–41)
MCH RBC QN AUTO: 22.4 PG (ref 27–33)
MCHC RBC AUTO-ENTMCNC: 29.9 G/DL (ref 33.7–35.3)
MCV RBC AUTO: 74.9 FL (ref 81.4–97.8)
MONOCYTES # BLD AUTO: 0.55 K/UL (ref 0–0.85)
MONOCYTES NFR BLD AUTO: 9.3 % (ref 0–13.4)
MORPHOLOGY BLD-IMP: NORMAL
NEUTROPHILS # BLD AUTO: 3.36 K/UL (ref 1.82–7.42)
NEUTROPHILS NFR BLD: 57.1 % (ref 44–72)
NRBC # BLD AUTO: 0.03 K/UL
NRBC BLD-RTO: 0.5 /100 WBC
PLATELET # BLD AUTO: 245 K/UL (ref 164–446)
PLATELET BLD QL SMEAR: NORMAL
POIKILOCYTOSIS BLD QL SMEAR: NORMAL
POTASSIUM SERPL-SCNC: 4.8 MMOL/L (ref 3.6–5.5)
PROT SERPL-MCNC: 7.9 G/DL (ref 6–8.2)
PROTHROMBIN TIME: 15.1 SEC (ref 12–14.6)
RBC # BLD AUTO: 5.26 M/UL (ref 4.7–6.1)
RBC BLD AUTO: PRESENT
SODIUM SERPL-SCNC: 134 MMOL/L (ref 135–145)
T4 FREE SERPL-MCNC: 1.19 NG/DL (ref 0.93–1.7)
TRIGL SERPL-MCNC: 144 MG/DL (ref 0–149)
TSH SERPL DL<=0.005 MIU/L-ACNC: 2.67 UIU/ML (ref 0.38–5.33)
URATE SERPL-MCNC: 9.1 MG/DL (ref 2.5–8.3)
WBC # BLD AUTO: 5.9 K/UL (ref 4.8–10.8)

## 2021-06-04 PROCEDURE — 85610 PROTHROMBIN TIME: CPT

## 2021-06-04 PROCEDURE — 84550 ASSAY OF BLOOD/URIC ACID: CPT

## 2021-06-04 PROCEDURE — 84439 ASSAY OF FREE THYROXINE: CPT

## 2021-06-04 PROCEDURE — 83036 HEMOGLOBIN GLYCOSYLATED A1C: CPT

## 2021-06-04 PROCEDURE — 80061 LIPID PANEL: CPT

## 2021-06-04 PROCEDURE — 80074 ACUTE HEPATITIS PANEL: CPT

## 2021-06-04 PROCEDURE — 36415 COLL VENOUS BLD VENIPUNCTURE: CPT

## 2021-06-04 PROCEDURE — 84443 ASSAY THYROID STIM HORMONE: CPT

## 2021-06-04 PROCEDURE — 80053 COMPREHEN METABOLIC PANEL: CPT

## 2021-06-04 PROCEDURE — 83690 ASSAY OF LIPASE: CPT

## 2021-06-04 PROCEDURE — 85025 COMPLETE CBC W/AUTO DIFF WBC: CPT

## 2021-06-05 ENCOUNTER — HOSPITAL ENCOUNTER (OUTPATIENT)
Dept: LAB | Facility: MEDICAL CENTER | Age: 41
End: 2021-06-05
Attending: FAMILY MEDICINE
Payer: COMMERCIAL

## 2021-06-05 LAB — APTT PPP: 33 SEC (ref 24.7–36)

## 2021-06-05 PROCEDURE — 36415 COLL VENOUS BLD VENIPUNCTURE: CPT

## 2021-06-05 PROCEDURE — 85730 THROMBOPLASTIN TIME PARTIAL: CPT

## 2021-06-28 ENCOUNTER — OFFICE VISIT (OUTPATIENT)
Dept: MEDICAL GROUP | Facility: PHYSICIAN GROUP | Age: 41
End: 2021-06-28
Payer: COMMERCIAL

## 2021-06-28 VITALS
TEMPERATURE: 98.2 F | OXYGEN SATURATION: 98 % | DIASTOLIC BLOOD PRESSURE: 86 MMHG | WEIGHT: 254 LBS | SYSTOLIC BLOOD PRESSURE: 140 MMHG | HEART RATE: 117 BPM | BODY MASS INDEX: 35.56 KG/M2 | RESPIRATION RATE: 18 BRPM | HEIGHT: 71 IN

## 2021-06-28 DIAGNOSIS — I10 ESSENTIAL HYPERTENSION, BENIGN: ICD-10-CM

## 2021-06-28 DIAGNOSIS — M10.9 GOUT OF MULTIPLE SITES, UNSPECIFIED CAUSE, UNSPECIFIED CHRONICITY: ICD-10-CM

## 2021-06-28 DIAGNOSIS — Z86.2 H/O THALASSEMIA MINOR: ICD-10-CM

## 2021-06-28 DIAGNOSIS — R74.8 ABNORMAL LIVER ENZYMES: ICD-10-CM

## 2021-06-28 PROCEDURE — 99214 OFFICE O/P EST MOD 30 MIN: CPT | Performed by: FAMILY MEDICINE

## 2021-06-28 RX ORDER — ALLOPURINOL 100 MG/1
100 TABLET ORAL DAILY
Qty: 90 TABLET | Refills: 0 | Status: SHIPPED | OUTPATIENT
Start: 2021-06-28 | End: 2021-11-18

## 2021-06-28 RX ORDER — LOSARTAN POTASSIUM AND HYDROCHLOROTHIAZIDE 12.5; 1 MG/1; MG/1
1 TABLET ORAL DAILY
Qty: 90 TABLET | Refills: 1 | Status: SHIPPED | OUTPATIENT
Start: 2021-06-28 | End: 2022-02-14

## 2021-06-28 ASSESSMENT — FIBROSIS 4 INDEX: FIB4 SCORE: 2.08

## 2021-06-28 NOTE — PROGRESS NOTES
Subjective:     CC:   Chief Complaint   Patient presents with   • Follow-Up       HPI:   River presents today for follow-up.  Patient did see Ortho.  Patient states he is slowing down on his drinking of alcohol patient admits that he has had issues with alcohol drinking in the past.    Past Medical History:   Diagnosis Date   • ASTHMA    • Hypertension 2014   • Infectious disease age 6    chicken pox   • Thalassanemia 1979       Social History     Tobacco Use   • Smoking status: Former Smoker     Packs/day: 0.25     Years: 17.00     Pack years: 4.25     Types: Cigarettes     Start date: 10/10/2000     Quit date: 10/10/2017     Years since quitting: 3.7   • Smokeless tobacco: Former User     Types: Chew   • Tobacco comment: quit 4/2011   Vaping Use   • Vaping Use: Never used   Substance Use Topics   • Alcohol use: Yes     Comment: 2 shoots per day liquer vodka   • Drug use: No       Current Outpatient Medications Ordered in Epic   Medication Sig Dispense Refill   • losartan-hydrochlorothiazide (HYZAAR) 100-12.5 MG per tablet Take 1 tablet by mouth every day for 90 days. 90 tablet 1   • allopurinol (ZYLOPRIM) 100 MG Tab Take 1 tablet by mouth every day. 90 tablet 0   • albuterol 108 (90 Base) MCG/ACT Aero Soln inhalation aerosol Inhale 2 Puffs every four hours as needed for Shortness of Breath. 1 Each 0   • ibuprofen (MOTRIN) 200 MG Tab Take 200 mg by mouth every 6 hours as needed.       No current Epic-ordered facility-administered medications on file.       Allergies:  Patient has no known allergies.    Health Maintenance: Completed    ROS:  Gen: no fevers/chills  Eyes: no changes in vision  ENT: no sore throat, no hearing loss, no bloody nose  Pulm: no sob, no cough  CV: no chest pain, no palpitations  GI: no nausea/vomiting, no diarrhea  : no dysuria  MSk: no myalgias  Skin: no rash  Neuro: no headaches, no numbness/tingling  Heme/Lymph: no easy bruising    Objective:     Exam:  /86   Pulse (!) 117    "Temp 36.8 °C (98.2 °F)   Resp 18   Ht 1.803 m (5' 11\")   Wt 115 kg (254 lb)   SpO2 98%   BMI 35.43 kg/m²  Body mass index is 35.43 kg/m².    Gen: Alert and oriented, No apparent distress.  Skin: Warm and dry.  No obvious lesions.  Eyes: Sclera wnl Pupils normal in size  Lungs: Normal effort, CTA bilaterally, no wheezes, rhonchi, or rales  CV: Regular rate and rhythm. No murmurs, rubs, or gallops.  ABD: Soft non-tender no organomegaly  Musculoskeletal: Normal gait. No extremity cyanosis, clubbing, or edema.  Neuro: Oriented to person, place and time  Psych: Mood is wnl         Assessment & Plan:     40 y.o. male with the following -     1. Essential hypertension, benign  Adding the diuretic appears to have helped his blood pressure we will continue on present medications this is a chronic problem  2. Gout of multiple sites, unspecified cause, unspecified chronicity  Patient's uric acid is above normal will start patient on low-dose allopurinol I would recommend repeating this in 1 month patient to avoid alcohol patient expressed understanding this this is a chronic problem  3. Abnormal liver enzymes  Patient liver enzymes are still elevated his hepatitis panel was negative patient has a history of this showed him his labs in the past discussed the need to avoid drinking alcohol we will repeat this again in 1 month this is a chronic problem    4. H/O thalassemia minor  Patient does have some red blood cell abnormalities due to his thalassemia this is a chronic problem    Other orders  - losartan-hydrochlorothiazide (HYZAAR) 100-12.5 MG per tablet; Take 1 tablet by mouth every day for 90 days.  Dispense: 90 tablet; Refill: 1       Return in about 4 weeks (around 7/26/2021).    Please note that this dictation was created using voice recognition software. I have made every reasonable attempt to correct obvious errors, but I expect that there are errors of grammar and possibly content that I did not discover before " finalizing the note.

## 2021-11-18 RX ORDER — ALLOPURINOL 100 MG/1
100 TABLET ORAL DAILY
Qty: 90 TABLET | Refills: 0 | Status: SHIPPED | OUTPATIENT
Start: 2021-11-18 | End: 2022-06-08

## 2022-01-17 ENCOUNTER — HOSPITAL ENCOUNTER (OUTPATIENT)
Dept: RADIOLOGY | Facility: MEDICAL CENTER | Age: 42
End: 2022-01-17
Attending: EMERGENCY MEDICINE
Payer: COMMERCIAL

## 2022-01-17 ENCOUNTER — OFFICE VISIT (OUTPATIENT)
Dept: URGENT CARE | Facility: PHYSICIAN GROUP | Age: 42
End: 2022-01-17
Payer: COMMERCIAL

## 2022-01-17 ENCOUNTER — HOSPITAL ENCOUNTER (OUTPATIENT)
Facility: MEDICAL CENTER | Age: 42
End: 2022-01-17
Attending: EMERGENCY MEDICINE
Payer: COMMERCIAL

## 2022-01-17 VITALS
HEIGHT: 71 IN | WEIGHT: 235 LBS | HEART RATE: 97 BPM | TEMPERATURE: 97.9 F | SYSTOLIC BLOOD PRESSURE: 142 MMHG | BODY MASS INDEX: 32.9 KG/M2 | DIASTOLIC BLOOD PRESSURE: 70 MMHG | OXYGEN SATURATION: 99 % | RESPIRATION RATE: 16 BRPM

## 2022-01-17 DIAGNOSIS — R05.9 COUGH: ICD-10-CM

## 2022-01-17 DIAGNOSIS — J45.21 MILD INTERMITTENT ASTHMA WITH ACUTE EXACERBATION: ICD-10-CM

## 2022-01-17 DIAGNOSIS — J98.8 RTI (RESPIRATORY TRACT INFECTION): ICD-10-CM

## 2022-01-17 PROCEDURE — 71046 X-RAY EXAM CHEST 2 VIEWS: CPT

## 2022-01-17 PROCEDURE — 99214 OFFICE O/P EST MOD 30 MIN: CPT | Mod: CS | Performed by: EMERGENCY MEDICINE

## 2022-01-17 PROCEDURE — U0005 INFEC AGEN DETEC AMPLI PROBE: HCPCS

## 2022-01-17 PROCEDURE — U0003 INFECTIOUS AGENT DETECTION BY NUCLEIC ACID (DNA OR RNA); SEVERE ACUTE RESPIRATORY SYNDROME CORONAVIRUS 2 (SARS-COV-2) (CORONAVIRUS DISEASE [COVID-19]), AMPLIFIED PROBE TECHNIQUE, MAKING USE OF HIGH THROUGHPUT TECHNOLOGIES AS DESCRIBED BY CMS-2020-01-R: HCPCS

## 2022-01-17 RX ORDER — INHALER, ASSIST DEVICES
1 SPACER (EA) MISCELLANEOUS ONCE
Qty: 1 EACH | Refills: 0 | Status: SHIPPED | OUTPATIENT
Start: 2022-01-17 | End: 2022-01-17

## 2022-01-17 RX ORDER — DOXYCYCLINE HYCLATE 100 MG
100 TABLET ORAL 2 TIMES DAILY
Qty: 10 TABLET | Refills: 0 | Status: SHIPPED | OUTPATIENT
Start: 2022-01-17 | End: 2022-01-22

## 2022-01-17 RX ORDER — ALBUTEROL SULFATE 90 UG/1
2 AEROSOL, METERED RESPIRATORY (INHALATION) EVERY 6 HOURS PRN
Qty: 8.5 G | Refills: 1 | Status: SHIPPED | OUTPATIENT
Start: 2022-01-17 | End: 2022-07-20

## 2022-01-17 RX ORDER — FLUTICASONE PROPIONATE 220 UG/1
1 AEROSOL, METERED RESPIRATORY (INHALATION) 2 TIMES DAILY
Qty: 12 G | Refills: 0 | Status: SHIPPED | OUTPATIENT
Start: 2022-01-17 | End: 2022-07-20

## 2022-01-17 RX ORDER — PREDNISONE 20 MG/1
TABLET ORAL
Qty: 16 TABLET | Refills: 0 | Status: SHIPPED | OUTPATIENT
Start: 2022-01-17 | End: 2022-07-20

## 2022-01-17 ASSESSMENT — ENCOUNTER SYMPTOMS
SORE THROAT: 0
WHEEZING: 0
SPUTUM PRODUCTION: 1
HEMOPTYSIS: 0
VOMITING: 0
DIARRHEA: 0
LOSS OF CONSCIOUSNESS: 0
NAUSEA: 0
SHORTNESS OF BREATH: 1
COUGH: 1
HEARTBURN: 0
DIZZINESS: 1

## 2022-01-17 ASSESSMENT — FIBROSIS 4 INDEX: FIB4 SCORE: 2.13

## 2022-01-17 NOTE — LETTER
[facility] [address] [id number]     January 17, 2022    Patient: River Olson   YOB: 1980   Date of Visit: 1/17/2022       To Whom It May Concern:    River Olson is unable to attend work January 17 through 20, 2022 for medical reasons.  Sincerely,     Lavell Contreras M.D.

## 2022-01-17 NOTE — PROGRESS NOTES
"Subjective     River Olson is a 41 y.o. male who presents with Cough (x 2 weeks with SOB and dizziness and lightheaded)            Cough  This is a new problem. Episode onset: 2 weeks. The cough is productive of sputum. Associated symptoms include nasal congestion and shortness of breath. Pertinent negatives include no chest pain, heartburn, hemoptysis, sore throat or wheezing. He has tried a beta-agonist inhaler for the symptoms. The treatment provided mild relief. His past medical history is significant for asthma and environmental allergies.   Onset of illness associated with vigorous exertion event at work.  Subsequently noted congestion, fever, nasal congestion, sore throat which have improved.  Notes continued coughing events, lightheadedness.    Review of Systems   Constitutional: Positive for malaise/fatigue.   HENT: Positive for congestion. Negative for nosebleeds and sore throat.    Respiratory: Positive for cough, sputum production and shortness of breath. Negative for hemoptysis and wheezing.    Cardiovascular: Negative for chest pain.   Gastrointestinal: Negative for diarrhea, heartburn, nausea and vomiting.   Neurological: Positive for dizziness. Negative for loss of consciousness.   Endo/Heme/Allergies: Positive for environmental allergies.              Objective     /70 (BP Location: Left arm, Patient Position: Sitting, BP Cuff Size: Adult long)   Pulse 97   Temp 36.6 °C (97.9 °F) (Temporal)   Resp 16   Ht 1.803 m (5' 11\")   Wt 107 kg (235 lb)   SpO2 99%   BMI 32.78 kg/m²      Physical Exam  Constitutional:       General: He is not in acute distress.     Appearance: He is well-developed. He is not ill-appearing or toxic-appearing.   HENT:      Head: Normocephalic.      Jaw: No trismus.      Nose: Mucosal edema present. No rhinorrhea.      Right Sinus: No maxillary sinus tenderness or frontal sinus tenderness.      Left Sinus: No maxillary sinus tenderness or frontal sinus tenderness.    "   Mouth/Throat:      Pharynx: Uvula midline. No oropharyngeal exudate or posterior oropharyngeal erythema.   Eyes:      Conjunctiva/sclera: Conjunctivae normal.   Neck:      Vascular: No JVD.      Trachea: Trachea normal.   Cardiovascular:      Rate and Rhythm: Normal rate and regular rhythm.  No extrasystoles are present.     Heart sounds: Normal heart sounds. No murmur heard.      Pulmonary:      Effort: No tachypnea, accessory muscle usage or prolonged expiration.      Breath sounds: Decreased breath sounds, wheezing and rhonchi present.   Musculoskeletal:      Cervical back: Neck supple.      Right lower leg: No edema.      Left lower leg: No edema.   Lymphadenopathy:      Cervical: No cervical adenopathy.   Skin:     General: Skin is warm and dry.   Neurological:      Mental Status: He is alert.   Psychiatric:         Behavior: Behavior is cooperative.                             Assessment & Plan        1. Mild intermittent asthma with acute exacerbation  Rx albuterol, AeroChamber, Flovent, prednisone  Recheck if not improving in 2 to 3 days.  Follow-up PCP as needed  2. RTI (respiratory tract infection)  - SARS-CoV-2 PCR (24 hour In-House): Collect NP swab in VTM; Future  Rx doxycycline  3. Cough  - DX-CHEST-2 VIEWS; Interpretation per radiologist:   1.  Ill-defined right middle lobe hazy opacity could be due to atelectasis or pneumonitis.  Own Tessalon Perles as needed

## 2022-01-18 DIAGNOSIS — J98.8 RTI (RESPIRATORY TRACT INFECTION): ICD-10-CM

## 2022-01-18 LAB — COVID ORDER STATUS COVID19: NORMAL

## 2022-01-19 LAB
SARS-COV-2 RNA RESP QL NAA+PROBE: DETECTED
SPECIMEN SOURCE: ABNORMAL

## 2022-06-08 DIAGNOSIS — R74.8 ABNORMAL LIVER ENZYMES: ICD-10-CM

## 2022-06-08 DIAGNOSIS — M10.9 GOUT OF MULTIPLE SITES, UNSPECIFIED CAUSE, UNSPECIFIED CHRONICITY: ICD-10-CM

## 2022-06-08 DIAGNOSIS — I10 ESSENTIAL HYPERTENSION, BENIGN: ICD-10-CM

## 2022-06-09 RX ORDER — LOSARTAN POTASSIUM AND HYDROCHLOROTHIAZIDE 12.5; 1 MG/1; MG/1
1 TABLET ORAL
Qty: 90 TABLET | Refills: 0 | Status: SHIPPED | OUTPATIENT
Start: 2022-06-09 | End: 2023-05-17

## 2022-07-01 ENCOUNTER — HOSPITAL ENCOUNTER (OUTPATIENT)
Dept: LAB | Facility: MEDICAL CENTER | Age: 42
End: 2022-07-01
Attending: FAMILY MEDICINE
Payer: COMMERCIAL

## 2022-07-01 DIAGNOSIS — M10.9 GOUT OF MULTIPLE SITES, UNSPECIFIED CAUSE, UNSPECIFIED CHRONICITY: ICD-10-CM

## 2022-07-01 DIAGNOSIS — I10 ESSENTIAL HYPERTENSION, BENIGN: ICD-10-CM

## 2022-07-01 DIAGNOSIS — R74.8 ABNORMAL LIVER ENZYMES: ICD-10-CM

## 2022-07-01 LAB
ALBUMIN SERPL BCP-MCNC: 4.8 G/DL (ref 3.2–4.9)
ALBUMIN/GLOB SERPL: 1.8 G/DL
ALP SERPL-CCNC: 61 U/L (ref 30–99)
ALT SERPL-CCNC: 106 U/L (ref 2–50)
ANION GAP SERPL CALC-SCNC: 14 MMOL/L (ref 7–16)
AST SERPL-CCNC: 102 U/L (ref 12–45)
BASOPHILS # BLD AUTO: 0.6 % (ref 0–1.8)
BASOPHILS # BLD: 0.04 K/UL (ref 0–0.12)
BILIRUB SERPL-MCNC: 4.8 MG/DL (ref 0.1–1.5)
BUN SERPL-MCNC: 14 MG/DL (ref 8–22)
CALCIUM SERPL-MCNC: 9.2 MG/DL (ref 8.5–10.5)
CHLORIDE SERPL-SCNC: 99 MMOL/L (ref 96–112)
CHOLEST SERPL-MCNC: 121 MG/DL (ref 100–199)
CO2 SERPL-SCNC: 23 MMOL/L (ref 20–33)
CREAT SERPL-MCNC: 0.9 MG/DL (ref 0.5–1.4)
EOSINOPHIL # BLD AUTO: 0.02 K/UL (ref 0–0.51)
EOSINOPHIL NFR BLD: 0.3 % (ref 0–6.9)
ERYTHROCYTE [DISTWIDTH] IN BLOOD BY AUTOMATED COUNT: 43.5 FL (ref 35.9–50)
FASTING STATUS PATIENT QL REPORTED: NORMAL
GFR SERPLBLD CREATININE-BSD FMLA CKD-EPI: 109 ML/MIN/1.73 M 2
GLOBULIN SER CALC-MCNC: 2.7 G/DL (ref 1.9–3.5)
GLUCOSE SERPL-MCNC: 106 MG/DL (ref 65–99)
HCT VFR BLD AUTO: 38.6 % (ref 42–52)
HDLC SERPL-MCNC: 41 MG/DL
HGB BLD-MCNC: 12.2 G/DL (ref 14–18)
IMM GRANULOCYTES # BLD AUTO: 0.04 K/UL (ref 0–0.11)
IMM GRANULOCYTES NFR BLD AUTO: 0.6 % (ref 0–0.9)
LDLC SERPL CALC-MCNC: 59 MG/DL
LYMPHOCYTES # BLD AUTO: 1.28 K/UL (ref 1–4.8)
LYMPHOCYTES NFR BLD: 17.8 % (ref 22–41)
MCH RBC QN AUTO: 23.3 PG (ref 27–33)
MCHC RBC AUTO-ENTMCNC: 31.6 G/DL (ref 33.7–35.3)
MCV RBC AUTO: 73.7 FL (ref 81.4–97.8)
MONOCYTES # BLD AUTO: 0.5 K/UL (ref 0–0.85)
MONOCYTES NFR BLD AUTO: 6.9 % (ref 0–13.4)
NEUTROPHILS # BLD AUTO: 5.33 K/UL (ref 1.82–7.42)
NEUTROPHILS NFR BLD: 73.8 % (ref 44–72)
NRBC # BLD AUTO: 0.02 K/UL
NRBC BLD-RTO: 0.3 /100 WBC
PLATELET # BLD AUTO: 152 K/UL (ref 164–446)
POTASSIUM SERPL-SCNC: 4 MMOL/L (ref 3.6–5.5)
PROT SERPL-MCNC: 7.5 G/DL (ref 6–8.2)
RBC # BLD AUTO: 5.24 M/UL (ref 4.7–6.1)
SODIUM SERPL-SCNC: 136 MMOL/L (ref 135–145)
TRIGL SERPL-MCNC: 105 MG/DL (ref 0–149)
URATE SERPL-MCNC: 6 MG/DL (ref 2.5–8.3)
WBC # BLD AUTO: 7.2 K/UL (ref 4.8–10.8)

## 2022-07-01 PROCEDURE — 80061 LIPID PANEL: CPT

## 2022-07-01 PROCEDURE — 80053 COMPREHEN METABOLIC PANEL: CPT

## 2022-07-01 PROCEDURE — 85025 COMPLETE CBC W/AUTO DIFF WBC: CPT

## 2022-07-01 PROCEDURE — 84550 ASSAY OF BLOOD/URIC ACID: CPT

## 2022-07-01 PROCEDURE — 36415 COLL VENOUS BLD VENIPUNCTURE: CPT

## 2022-07-20 ENCOUNTER — OCCUPATIONAL MEDICINE (OUTPATIENT)
Dept: OCCUPATIONAL MEDICINE | Facility: OTHER | Age: 42
End: 2022-07-20
Payer: COMMERCIAL

## 2022-07-20 VITALS
TEMPERATURE: 97.9 F | BODY MASS INDEX: 31.36 KG/M2 | DIASTOLIC BLOOD PRESSURE: 80 MMHG | HEIGHT: 71 IN | SYSTOLIC BLOOD PRESSURE: 138 MMHG | HEART RATE: 112 BPM | OXYGEN SATURATION: 95 % | WEIGHT: 224 LBS

## 2022-07-20 DIAGNOSIS — R94.31 ABNORMAL ECG DURING EXERCISE STRESS TEST: ICD-10-CM

## 2022-07-20 PROCEDURE — 99204 OFFICE O/P NEW MOD 45 MIN: CPT | Performed by: FAMILY MEDICINE

## 2022-07-20 ASSESSMENT — ENCOUNTER SYMPTOMS
RESPIRATORY NEGATIVE: 1
CARDIOVASCULAR NEGATIVE: 1
GASTROINTESTINAL NEGATIVE: 1
CONSTITUTIONAL NEGATIVE: 1

## 2022-07-20 ASSESSMENT — FIBROSIS 4 INDEX: FIB4 SCORE: 2.74

## 2022-07-20 NOTE — PROGRESS NOTES
Subjective:   River Olson is a 41 y.o. male here for the evaluation and management of Follow-Up (Failed stress EKG 07/19/22)    Abnormal EKG  Annual stress test with wide-complex tachycardia diagnosed at approximately 4 minutes of exercise using a Airas protocol.    Patient reports no current cardiac symptoms.  He has been working on therapeutic lifestyle changes with intermittent fasting.  He reports moderate alcohol consumption typically at night.  No family history of heart disease  Problem   Abnormal Ecg During Exercise Stress Test       Review of Systems   Constitutional: Negative.    Respiratory: Negative.    Cardiovascular: Negative.    Gastrointestinal: Negative.    Genitourinary: Negative.        Current Outpatient Medications   Medication Sig Dispense Refill   • losartan-hydrochlorothiazide (HYZAAR) 100-12.5 MG per tablet Take 1 Tablet by mouth every day. 90 Tablet 0   • allopurinol (ZYLOPRIM) 100 MG Tab TAKE 1 TABLET BY MOUTH EVERY DAY 60 Tablet 0   • albuterol 108 (90 Base) MCG/ACT Aero Soln inhalation aerosol Inhale 2 Puffs every four hours as needed for Shortness of Breath. 1 Each 0     No current facility-administered medications for this visit.     Allergies  Patient has no known allergies.    Past Medical History:   Diagnosis Date   • ASTHMA    • Hypertension 2014   • Infectious disease age 6    chicken pox   • Thalassanemia 1979     Patient Active Problem List    Diagnosis Date Noted   • Abnormal ECG during exercise stress test 07/20/2022   • Gout of multiple sites 06/28/2021   • Snores 06/01/2021   • H/O thalassemia minor 11/28/2017   • Contraindication to deep vein thrombosis (DVT) prophylaxis 11/28/2017   • Abnormal liver enzymes 11/28/2017   • Essential hypertension, benign 12/01/2016       Past Surgical History  Past Surgical History:   Procedure Laterality Date   • OTHER  greater than 10 years    gall bladder removed        Objective:     Vitals:    07/20/22 1345   BP: 138/80   BP  "Location: Left arm   Patient Position: Sitting   Pulse: (!) 112   Temp: 36.6 °C (97.9 °F)   SpO2: 95%   Weight: 102 kg (224 lb)   Height: 1.803 m (5' 11\")     Body mass index is 31.24 kg/m².     Physical Exam  Constitutional:       Appearance: Normal appearance.   HENT:      Head: Normocephalic.   Eyes:      Extraocular Movements: Extraocular movements intact.      Conjunctiva/sclera: Conjunctivae normal.   Cardiovascular:      Rate and Rhythm: Normal rate and regular rhythm.      Heart sounds: Normal heart sounds.   Pulmonary:      Effort: Pulmonary effort is normal.      Breath sounds: Normal breath sounds.   Skin:     General: Skin is warm and dry.   Neurological:      Mental Status: He is alert and oriented to person, place, and time. Mental status is at baseline.   Psychiatric:         Mood and Affect: Mood normal.         Behavior: Behavior normal.         Assessment and Plan:   River Olson is a 41 y.o. male with a Follow-Up (Failed stress EKG 07/19/22)     The following was discussed with the patient today.    Problem List Items Addressed This Visit     Abnormal ECG during exercise stress test    Relevant Orders    EC-ECHOCARDIOGRAM COMPLETE W/ CONT    REFERRAL TO CARDIOLOGY        Reviewed and discussed recent stress testing with identification of wide-complex tachycardia at approximately 4 minutes of exercise using a Arias treadmill, laboratory studies reviewed and discussed with elevation in liver function testing consistent with alcohol use, CBC was mildly abnormal with microcytosis likely secondary to thalassemia which she reports by history.  Recommend cardiac consultation with echocardiogram  Followup: No follow-ups on file.    Tyson Smith M.D.    Please note that this dictation was created using voice recognition software. I have made every reasonable attempt to correct obvious errors, but I expect that there are errors of grammar and possibly content that I did not discover before " finalizing the note.

## 2022-07-20 NOTE — LETTER
2022    Patient: River Olson  : 1980  Provider: Tyson Smith M.D.    RETURN TO WORK    BODY PART: HEART  EMPLOYER: [employer]  DOI: 22    It is the injured worker's responsibility to inform the employer of current work status.    CURRENT RESTRICTIONS: LIGHT DUTY  LIMIT EXERTION TO KEEP HEART RATE BELOW 140    CONDITION STABLE: NO  CONDITION RATABLE: NO    RETURN VISIT: No follow-ups on file.    Electronically Signed: Tyson Smith M.D.

## 2022-08-25 ENCOUNTER — OFFICE VISIT (OUTPATIENT)
Dept: MEDICAL GROUP | Facility: OTHER | Age: 42
End: 2022-08-25
Payer: COMMERCIAL

## 2022-08-25 VITALS
TEMPERATURE: 99 F | HEIGHT: 71 IN | OXYGEN SATURATION: 95 % | BODY MASS INDEX: 29.82 KG/M2 | HEART RATE: 92 BPM | WEIGHT: 213 LBS | DIASTOLIC BLOOD PRESSURE: 84 MMHG | SYSTOLIC BLOOD PRESSURE: 140 MMHG

## 2022-08-25 DIAGNOSIS — R94.31 ABNORMAL ECG DURING EXERCISE STRESS TEST: ICD-10-CM

## 2022-08-25 PROCEDURE — 99212 OFFICE O/P EST SF 10 MIN: CPT | Performed by: FAMILY MEDICINE

## 2022-08-25 ASSESSMENT — FIBROSIS 4 INDEX: FIB4 SCORE: 2.74

## 2022-08-25 NOTE — PROGRESS NOTES
Subjective:   River Olson is a 41 y.o. male here today to discuss renewal of his light duty restrictions at work.  Patient was previously seen for abnormal stress test and referred to cardiology at his last visit.  He has an appointment to see cardiology on , and has been on light duty ever since.  He needs a redo on his light duty paperwork as it is required every 30 days.  He denies chest pain, shortness of breath, headache.  He has not been exercising regularly at home and has been on light duty at work.    ROS:  See HPI    Patient Active Problem List   Diagnosis    Essential hypertension, benign    H/O thalassemia minor    Contraindication to deep vein thrombosis (DVT) prophylaxis    Abnormal liver enzymes    Snores    Gout of multiple sites    Abnormal ECG during exercise stress test       Current medicines (including changes today)  Current Outpatient Medications   Medication Sig Dispense Refill    losartan-hydrochlorothiazide (HYZAAR) 100-12.5 MG per tablet Take 1 Tablet by mouth every day. 90 Tablet 0    allopurinol (ZYLOPRIM) 100 MG Tab TAKE 1 TABLET BY MOUTH EVERY DAY 60 Tablet 0    albuterol 108 (90 Base) MCG/ACT Aero Soln inhalation aerosol Inhale 2 Puffs every four hours as needed for Shortness of Breath. 1 Each 0     No current facility-administered medications for this visit.     Social History     Tobacco Use    Smoking status: Former     Packs/day: 0.25     Years: 17.00     Pack years: 4.25     Types: Cigarettes     Start date: 10/10/2000     Quit date: 10/10/2017     Years since quittin.8    Smokeless tobacco: Former     Types: Chew    Tobacco comments:     quit 2011   Vaping Use    Vaping Use: Never used   Substance Use Topics    Alcohol use: Yes     Comment: 2 shoots per day liquer vodka    Drug use: No     family history includes Alcohol abuse in his mother; Diabetes in his maternal grandfather; Heart Disease in his maternal grandfather.     Objective:     Vitals:  "   08/25/22 1421   BP: (!) 140/84   BP Location: Right arm   Patient Position: Sitting   Pulse: 92   Temp: 37.2 °C (99 °F)   SpO2: 95%   Weight: 96.6 kg (213 lb)   Height: 1.803 m (5' 11\")     Body mass index is 29.71 kg/m².     Physical Exam:  Gen: Well developed, well nourished in no acute distress.   Lungs: Effort is normal. Clear to auscultation bilaterally with good excursion.  CV: regular rate and rhythm without murmur          Assessment and Plan:   The following treatment plan was discussed:   1. Abnormal ECG during exercise stress test  New letter provided to the patient for light duty through his visit with cardiology on September 16.  Follow-up as needed.        Followup: No follow-ups on file.      Please note that this dictation was created using voice recognition software. I have worked with consultants from the vendor as well as technical experts from [facility] [employer] to optimize the interface. I have made every reasonable attempt to correct obvious errors, but I expect that there are errors of grammar and possibly content that I did not discover before finalizing the note.               "

## 2022-08-25 NOTE — LETTER
2022    Patient: River Olson  : 1980  Provider: Ifeoma Herman M.D.      RETURN TO WORK:22    BODY PART: Cardiac  EMPLOYER: [employer]  DATE OF INJURY:    It is the injured worker's responsibility to inform the employer of current work status.    CURRENT RESTRICTIONS: Light duty.  Keep heart rate under 140.  Remains in effect until evaluated by cardiologist on 22.      CONDITION STABLE: NO  CONDITION RATABLE: NO    RETURN VISIT: As needed.  Follow up with cardiology 22.    Electronically Signed: Ifeoma Herman M.D.

## 2022-09-16 ENCOUNTER — NON-PROVIDER VISIT (OUTPATIENT)
Dept: CARDIOLOGY | Facility: MEDICAL CENTER | Age: 42
End: 2022-09-16
Attending: INTERNAL MEDICINE
Payer: COMMERCIAL

## 2022-09-16 ENCOUNTER — OFFICE VISIT (OUTPATIENT)
Dept: CARDIOLOGY | Facility: MEDICAL CENTER | Age: 42
End: 2022-09-16
Attending: FAMILY MEDICINE
Payer: COMMERCIAL

## 2022-09-16 VITALS
WEIGHT: 219 LBS | OXYGEN SATURATION: 97 % | HEIGHT: 71 IN | HEART RATE: 104 BPM | BODY MASS INDEX: 30.66 KG/M2 | RESPIRATION RATE: 16 BRPM | DIASTOLIC BLOOD PRESSURE: 82 MMHG | SYSTOLIC BLOOD PRESSURE: 132 MMHG

## 2022-09-16 DIAGNOSIS — R74.8 ABNORMAL LIVER ENZYMES: ICD-10-CM

## 2022-09-16 DIAGNOSIS — R94.31 ABNORMAL ECG DURING EXERCISE STRESS TEST: ICD-10-CM

## 2022-09-16 DIAGNOSIS — I10 ESSENTIAL HYPERTENSION, BENIGN: ICD-10-CM

## 2022-09-16 DIAGNOSIS — I49.1 APC (ATRIAL PREMATURE CONTRACTIONS): ICD-10-CM

## 2022-09-16 DIAGNOSIS — I49.3 PVC'S (PREMATURE VENTRICULAR CONTRACTIONS): ICD-10-CM

## 2022-09-16 LAB — EKG IMPRESSION: NORMAL

## 2022-09-16 PROCEDURE — 93000 ELECTROCARDIOGRAM COMPLETE: CPT | Performed by: INTERNAL MEDICINE

## 2022-09-16 PROCEDURE — 99204 OFFICE O/P NEW MOD 45 MIN: CPT | Performed by: INTERNAL MEDICINE

## 2022-09-16 ASSESSMENT — ENCOUNTER SYMPTOMS
WHEEZING: 0
STRIDOR: 0
NEUROLOGICAL NEGATIVE: 1
COUGH: 0
SPUTUM PRODUCTION: 0
DIZZINESS: 0
CONSTITUTIONAL NEGATIVE: 1
PND: 0
GASTROINTESTINAL NEGATIVE: 1
SHORTNESS OF BREATH: 0
EYES NEGATIVE: 1
SORE THROAT: 0
FEVER: 0
HEMOPTYSIS: 0
CLAUDICATION: 0
CARDIOVASCULAR NEGATIVE: 1
MUSCULOSKELETAL NEGATIVE: 1
WEAKNESS: 0
RESPIRATORY NEGATIVE: 1
LOSS OF CONSCIOUSNESS: 0
CHILLS: 0
PALPITATIONS: 0
ORTHOPNEA: 0
BRUISES/BLEEDS EASILY: 0

## 2022-09-16 ASSESSMENT — FIBROSIS 4 INDEX: FIB4 SCORE: 2.74

## 2022-09-16 NOTE — PROGRESS NOTES
LVM on 10/21/2022 inquiring about monitor.      Home enrollment completed in the 14 day Zio XT Holter monitoring program, per Ludwin Baca M.D.  Monitor will be shipped to patient via [employer].  >Pending EOS.

## 2022-09-16 NOTE — PROGRESS NOTES
Chief Complaint   Patient presents with    Abnormal EKG    Hypertension       Subjective     River Olson is a 41 y.o. male who presents today as a new consultation for an abnormal stress test.  He is a 41-year-old male with only mild hypertension.  He works for [employer].  As part of his annual physical he had a stress test.  He completed 10.4 METS and was asked to stop for the reason of target heart rate achievement.  He had no symptoms.  According to the people supervising the stress test he went into a wide-complex tachycardia during his stress test.  However reviewing the strips myself it appears to be a rate related bundle branch block with sinus rhythm underlying it.  He was asymptomatic.  He has no premature family history of heart disease.  He does not smoke drink or do drugs.    Past Medical History:   Diagnosis Date    ASTHMA     Hypertension 2014    Infectious disease age 6    chicken pox    Thalassanemia      Past Surgical History:   Procedure Laterality Date    OTHER  greater than 10 years    gall bladder removed     Family History   Problem Relation Age of Onset    Alcohol abuse Mother     Diabetes Maternal Grandfather     Heart Disease Maternal Grandfather      Social History     Socioeconomic History    Marital status: Single     Spouse name: Not on file    Number of children: Not on file    Years of education: Not on file    Highest education level: Not on file   Occupational History    Not on file   Tobacco Use    Smoking status: Former     Packs/day: 0.25     Years: 17.00     Pack years: 4.25     Types: Cigarettes     Start date: 10/10/2000     Quit date: 10/10/2017     Years since quittin.9    Smokeless tobacco: Former     Types: Chew    Tobacco comments:     quit 2011   Vaping Use    Vaping Use: Never used   Substance and Sexual Activity    Alcohol use: Yes     Comment: 2 shoots per day liquer vodka    Drug use: No    Sexual activity: Not on file   Other Topics  "Concern    Not on file   Social History Narrative    Not on file     Social Determinants of Health     Financial Resource Strain: Not on file   Food Insecurity: Not on file   Transportation Needs: Not on file   Physical Activity: Not on file   Stress: Not on file   Social Connections: Not on file   Intimate Partner Violence: Not on file   Housing Stability: Not on file     No Known Allergies  Outpatient Encounter Medications as of 9/16/2022   Medication Sig Dispense Refill    losartan-hydrochlorothiazide (HYZAAR) 100-12.5 MG per tablet Take 1 Tablet by mouth every day. 90 Tablet 0    allopurinol (ZYLOPRIM) 100 MG Tab TAKE 1 TABLET BY MOUTH EVERY DAY 60 Tablet 0    albuterol 108 (90 Base) MCG/ACT Aero Soln inhalation aerosol Inhale 2 Puffs every four hours as needed for Shortness of Breath. 1 Each 0     No facility-administered encounter medications on file as of 9/16/2022.     Review of Systems   Constitutional: Negative.  Negative for chills, fever and malaise/fatigue.   HENT: Negative.  Negative for sore throat.    Eyes: Negative.    Respiratory: Negative.  Negative for cough, hemoptysis, sputum production, shortness of breath, wheezing and stridor.    Cardiovascular: Negative.  Negative for chest pain, palpitations, orthopnea, claudication, leg swelling and PND.   Gastrointestinal: Negative.    Genitourinary: Negative.    Musculoskeletal: Negative.    Skin: Negative.    Neurological: Negative.  Negative for dizziness, loss of consciousness and weakness.   Endo/Heme/Allergies: Negative.  Does not bruise/bleed easily.   All other systems reviewed and are negative.           Objective     /82 (BP Location: Left arm, Patient Position: Sitting, BP Cuff Size: Adult)   Pulse (!) 104   Resp 16   Ht 1.803 m (5' 11\")   Wt 99.3 kg (219 lb)   SpO2 97%   BMI 30.54 kg/m²     Physical Exam  Vitals and nursing note reviewed.   Constitutional:       General: He is not in acute distress.     Appearance: He is " well-developed. He is not diaphoretic.   HENT:      Head: Normocephalic and atraumatic.      Right Ear: External ear normal.      Left Ear: External ear normal.      Nose: Nose normal.      Mouth/Throat:      Pharynx: No oropharyngeal exudate.   Eyes:      General: No scleral icterus.        Right eye: No discharge.         Left eye: No discharge.      Conjunctiva/sclera: Conjunctivae normal.      Pupils: Pupils are equal, round, and reactive to light.   Neck:      Vascular: No JVD.   Cardiovascular:      Rate and Rhythm: Normal rate and regular rhythm.      Heart sounds: No murmur heard.    No friction rub. No gallop.   Pulmonary:      Effort: Pulmonary effort is normal. No respiratory distress.      Breath sounds: No stridor. No wheezing or rales.   Chest:      Chest wall: No tenderness.   Abdominal:      General: There is no distension.      Palpations: Abdomen is soft.      Tenderness: There is no guarding.   Musculoskeletal:         General: No tenderness or deformity. Normal range of motion.      Cervical back: Neck supple.   Skin:     General: Skin is warm and dry.      Coloration: Skin is not pale.      Findings: No erythema or rash.   Neurological:      Mental Status: He is alert.      Cranial Nerves: No cranial nerve deficit.      Motor: No abnormal muscle tone.      Coordination: Coordination normal.      Deep Tendon Reflexes: Reflexes are normal and symmetric. Reflexes normal.   Psychiatric:         Behavior: Behavior normal.         Thought Content: Thought content normal.         Judgment: Judgment normal.          Stress test: Scanned in media and reviewed by myself showing a rate related bundle branch block with normal sinus rhythm.  10.1 METS achieved during stress test.  Stress test stopped for the reason of target heart rate.    Labs: Scanned into media and reviewed by myself showing LDL at goal of 60.  LFTs mildly elevated.    Assessment & Plan     1. Essential hypertension, benign  EKG     Cardiac Event Monitor    EC-ECHOCARDIOGRAM COMPLETE W/O CONT      2. Abnormal ECG during exercise stress test  EKG    Cardiac Event Monitor    EC-ECHOCARDIOGRAM COMPLETE W/O CONT      3. Abnormal liver enzymes  Cardiac Event Monitor    EC-ECHOCARDIOGRAM COMPLETE W/O CONT          Medical Decision Making: Today's Assessment/Status/Plan:        41-year-old male with a rate related bundle branch block.  This is a normal variant and does not require any further evaluation.  He is cleared to return to full duty.  While we are here he is concerned about his high resting heart rate.  We will have him wear a Zio patch to make sure that his heart rate is returning to normal at night.  We will also check an echocardiogram.  I will see him back in 6 months.

## 2022-10-11 ENCOUNTER — NON-PROVIDER VISIT (OUTPATIENT)
Dept: URGENT CARE | Facility: PHYSICIAN GROUP | Age: 42
End: 2022-10-11

## 2022-10-11 DIAGNOSIS — Z11.1 ENCOUNTER FOR PPD TEST: Primary | ICD-10-CM

## 2022-10-11 PROCEDURE — 86580 TB INTRADERMAL TEST: CPT | Performed by: NURSE PRACTITIONER

## 2022-12-12 ENCOUNTER — TELEPHONE (OUTPATIENT)
Dept: CARDIOLOGY | Facility: MEDICAL CENTER | Age: 42
End: 2022-12-12
Payer: COMMERCIAL

## 2022-12-12 NOTE — TELEPHONE ENCOUNTER
RN received paperwork from [employer], workers comp, to be completed. RN copied into [employer] and placed on 's desk. Also sent a routing comment to [employer].

## 2023-01-19 ENCOUNTER — HOSPITAL ENCOUNTER (OUTPATIENT)
Dept: CARDIOLOGY | Facility: MEDICAL CENTER | Age: 43
End: 2023-01-19
Attending: INTERNAL MEDICINE
Payer: COMMERCIAL

## 2023-01-19 DIAGNOSIS — R74.8 ABNORMAL LIVER ENZYMES: ICD-10-CM

## 2023-01-19 DIAGNOSIS — R94.31 ABNORMAL ECG DURING EXERCISE STRESS TEST: ICD-10-CM

## 2023-01-19 DIAGNOSIS — I10 ESSENTIAL HYPERTENSION, BENIGN: ICD-10-CM

## 2023-01-19 PROCEDURE — 93306 TTE W/DOPPLER COMPLETE: CPT

## 2023-01-23 LAB
LV EJECT FRACT  99904: 70
LV EJECT FRACT MOD 2C 99903: 69.88
LV EJECT FRACT MOD 4C 99902: 66.63
LV EJECT FRACT MOD BP 99901: 67.96

## 2023-01-23 PROCEDURE — 93306 TTE W/DOPPLER COMPLETE: CPT | Mod: 26 | Performed by: INTERNAL MEDICINE

## 2023-01-27 ENCOUNTER — TELEPHONE (OUTPATIENT)
Dept: CARDIOLOGY | Facility: MEDICAL CENTER | Age: 43
End: 2023-01-27
Payer: COMMERCIAL

## 2023-03-05 PROCEDURE — 93248 EXT ECG>7D<15D REV&INTERPJ: CPT | Performed by: INTERNAL MEDICINE

## 2023-05-03 ENCOUNTER — HOSPITAL ENCOUNTER (OUTPATIENT)
Dept: RADIOLOGY | Facility: MEDICAL CENTER | Age: 43
End: 2023-05-03
Attending: INTERNAL MEDICINE
Payer: COMMERCIAL

## 2023-05-03 DIAGNOSIS — Z13.83 SCREENING FOR CHRONIC BRONCHITIS AND EMPHYSEMA: ICD-10-CM

## 2023-05-03 PROCEDURE — 71046 X-RAY EXAM CHEST 2 VIEWS: CPT

## 2023-05-17 ENCOUNTER — APPOINTMENT (OUTPATIENT)
Dept: RADIOLOGY | Facility: MEDICAL CENTER | Age: 43
DRG: 392 | End: 2023-05-17
Attending: INTERNAL MEDICINE
Payer: COMMERCIAL

## 2023-05-17 ENCOUNTER — HOSPITAL ENCOUNTER (OUTPATIENT)
Facility: MEDICAL CENTER | Age: 43
End: 2023-05-17
Attending: REGISTERED NURSE
Payer: COMMERCIAL

## 2023-05-17 ENCOUNTER — OFFICE VISIT (OUTPATIENT)
Dept: URGENT CARE | Facility: PHYSICIAN GROUP | Age: 43
End: 2023-05-17
Payer: COMMERCIAL

## 2023-05-17 ENCOUNTER — HOSPITAL ENCOUNTER (OUTPATIENT)
Dept: RADIOLOGY | Facility: MEDICAL CENTER | Age: 43
End: 2023-05-17
Attending: REGISTERED NURSE
Payer: COMMERCIAL

## 2023-05-17 ENCOUNTER — HOSPITAL ENCOUNTER (INPATIENT)
Facility: MEDICAL CENTER | Age: 43
LOS: 6 days | DRG: 392 | End: 2023-05-23
Attending: EMERGENCY MEDICINE | Admitting: INTERNAL MEDICINE
Payer: COMMERCIAL

## 2023-05-17 ENCOUNTER — APPOINTMENT (OUTPATIENT)
Dept: RADIOLOGY | Facility: MEDICAL CENTER | Age: 43
DRG: 392 | End: 2023-05-17
Attending: EMERGENCY MEDICINE
Payer: COMMERCIAL

## 2023-05-17 VITALS
SYSTOLIC BLOOD PRESSURE: 100 MMHG | WEIGHT: 200 LBS | HEIGHT: 71 IN | BODY MASS INDEX: 28 KG/M2 | HEART RATE: 100 BPM | DIASTOLIC BLOOD PRESSURE: 60 MMHG | RESPIRATION RATE: 16 BRPM | OXYGEN SATURATION: 96 % | TEMPERATURE: 99 F

## 2023-05-17 DIAGNOSIS — K57.20 COLONIC DIVERTICULAR ABSCESS: ICD-10-CM

## 2023-05-17 DIAGNOSIS — R10.32 LLQ ABDOMINAL PAIN: ICD-10-CM

## 2023-05-17 DIAGNOSIS — K57.92 DIVERTICULITIS: ICD-10-CM

## 2023-05-17 LAB
ALBUMIN SERPL BCP-MCNC: 3.7 G/DL (ref 3.2–4.9)
ALBUMIN/GLOB SERPL: 1 G/DL
ALP SERPL-CCNC: 60 U/L (ref 30–99)
ALT SERPL-CCNC: 26 U/L (ref 2–50)
ANION GAP SERPL CALC-SCNC: 10 MMOL/L (ref 7–16)
APPEARANCE UR: NORMAL
AST SERPL-CCNC: 23 U/L (ref 12–45)
BASOPHILS # BLD AUTO: 0.3 % (ref 0–1.8)
BASOPHILS # BLD: 0.03 K/UL (ref 0–0.12)
BILIRUB SERPL-MCNC: 3.6 MG/DL (ref 0.1–1.5)
BILIRUB UR STRIP-MCNC: NORMAL MG/DL
BUN SERPL-MCNC: 15 MG/DL (ref 8–22)
CALCIUM ALBUM COR SERPL-MCNC: 8.6 MG/DL (ref 8.5–10.5)
CALCIUM SERPL-MCNC: 8.4 MG/DL (ref 8.5–10.5)
CHLORIDE SERPL-SCNC: 94 MMOL/L (ref 96–112)
CO2 SERPL-SCNC: 23 MMOL/L (ref 20–33)
COLOR UR AUTO: NORMAL
CREAT SERPL-MCNC: 0.71 MG/DL (ref 0.5–1.4)
EOSINOPHIL # BLD AUTO: 0.01 K/UL (ref 0–0.51)
EOSINOPHIL NFR BLD: 0.1 % (ref 0–6.9)
ERYTHROCYTE [DISTWIDTH] IN BLOOD BY AUTOMATED COUNT: 45.9 FL (ref 35.9–50)
GFR SERPLBLD CREATININE-BSD FMLA CKD-EPI: 116 ML/MIN/1.73 M 2
GLOBULIN SER CALC-MCNC: 3.8 G/DL (ref 1.9–3.5)
GLUCOSE SERPL-MCNC: 145 MG/DL (ref 65–99)
GLUCOSE UR STRIP.AUTO-MCNC: 100 MG/DL
HCT VFR BLD AUTO: 31.8 % (ref 42–52)
HGB BLD-MCNC: 9.9 G/DL (ref 14–18)
HGB RETIC QN AUTO: 20.9 PG/CELL (ref 29–35)
IMM GRANULOCYTES # BLD AUTO: 0.07 K/UL (ref 0–0.11)
IMM GRANULOCYTES NFR BLD AUTO: 0.6 % (ref 0–0.9)
IMM RETICS NFR: 22 % (ref 9.3–17.4)
IRON SATN MFR SERPL: 7 % (ref 15–55)
IRON SERPL-MCNC: 15 UG/DL (ref 50–180)
KETONES UR STRIP.AUTO-MCNC: NORMAL MG/DL
LEUKOCYTE ESTERASE UR QL STRIP.AUTO: NEGATIVE
LIPASE SERPL-CCNC: 21 U/L (ref 11–82)
LYMPHOCYTES # BLD AUTO: 1.59 K/UL (ref 1–4.8)
LYMPHOCYTES NFR BLD: 13.5 % (ref 22–41)
MCH RBC QN AUTO: 22.8 PG (ref 27–33)
MCHC RBC AUTO-ENTMCNC: 31.1 G/DL (ref 33.7–35.3)
MCV RBC AUTO: 73.3 FL (ref 81.4–97.8)
MONOCYTES # BLD AUTO: 0.92 K/UL (ref 0–0.85)
MONOCYTES NFR BLD AUTO: 7.8 % (ref 0–13.4)
NEUTROPHILS # BLD AUTO: 9.13 K/UL (ref 1.82–7.42)
NEUTROPHILS NFR BLD: 77.7 % (ref 44–72)
NITRITE UR QL STRIP.AUTO: NORMAL
NRBC # BLD AUTO: 0 K/UL
NRBC BLD-RTO: 0 /100 WBC
PH UR STRIP.AUTO: 6.5 [PH] (ref 5–8)
PLATELET # BLD AUTO: 299 K/UL (ref 164–446)
POTASSIUM SERPL-SCNC: 4 MMOL/L (ref 3.6–5.5)
PROT SERPL-MCNC: 7.5 G/DL (ref 6–8.2)
PROT UR QL STRIP: 100 MG/DL
RBC # BLD AUTO: 4.34 M/UL (ref 4.7–6.1)
RBC UR QL AUTO: NEGATIVE
RETICS # AUTO: 0.15 M/UL (ref 0.04–0.06)
RETICS/RBC NFR: 3.8 % (ref 0.8–2.1)
SODIUM SERPL-SCNC: 127 MMOL/L (ref 135–145)
SP GR UR STRIP.AUTO: 1.01
TIBC SERPL-MCNC: 209 UG/DL (ref 250–450)
UIBC SERPL-MCNC: 194 UG/DL (ref 110–370)
UROBILINOGEN UR STRIP-MCNC: 8 MG/DL
WBC # BLD AUTO: 11.8 K/UL (ref 4.8–10.8)

## 2023-05-17 PROCEDURE — 83550 IRON BINDING TEST: CPT

## 2023-05-17 PROCEDURE — 99285 EMERGENCY DEPT VISIT HI MDM: CPT

## 2023-05-17 PROCEDURE — 770006 HCHG ROOM/CARE - MED/SURG/GYN SEMI*

## 2023-05-17 PROCEDURE — 83690 ASSAY OF LIPASE: CPT

## 2023-05-17 PROCEDURE — 80053 COMPREHEN METABOLIC PANEL: CPT

## 2023-05-17 PROCEDURE — 99223 1ST HOSP IP/OBS HIGH 75: CPT | Performed by: INTERNAL MEDICINE

## 2023-05-17 PROCEDURE — 700117 HCHG RX CONTRAST REV CODE 255: Performed by: REGISTERED NURSE

## 2023-05-17 PROCEDURE — 83540 ASSAY OF IRON: CPT

## 2023-05-17 PROCEDURE — 3078F DIAST BP <80 MM HG: CPT | Performed by: REGISTERED NURSE

## 2023-05-17 PROCEDURE — 700111 HCHG RX REV CODE 636 W/ 250 OVERRIDE (IP): Performed by: EMERGENCY MEDICINE

## 2023-05-17 PROCEDURE — 85025 COMPLETE CBC W/AUTO DIFF WBC: CPT

## 2023-05-17 PROCEDURE — 74177 CT ABD & PELVIS W/CONTRAST: CPT

## 2023-05-17 PROCEDURE — 700105 HCHG RX REV CODE 258: Performed by: INTERNAL MEDICINE

## 2023-05-17 PROCEDURE — 83605 ASSAY OF LACTIC ACID: CPT

## 2023-05-17 PROCEDURE — 36415 COLL VENOUS BLD VENIPUNCTURE: CPT

## 2023-05-17 PROCEDURE — 82728 ASSAY OF FERRITIN: CPT

## 2023-05-17 PROCEDURE — 71045 X-RAY EXAM CHEST 1 VIEW: CPT

## 2023-05-17 PROCEDURE — 3074F SYST BP LT 130 MM HG: CPT | Performed by: REGISTERED NURSE

## 2023-05-17 PROCEDURE — 85046 RETICYTE/HGB CONCENTRATE: CPT

## 2023-05-17 PROCEDURE — 87040 BLOOD CULTURE FOR BACTERIA: CPT | Mod: 91

## 2023-05-17 PROCEDURE — 82607 VITAMIN B-12: CPT

## 2023-05-17 PROCEDURE — 81002 URINALYSIS NONAUTO W/O SCOPE: CPT | Performed by: REGISTERED NURSE

## 2023-05-17 PROCEDURE — 87086 URINE CULTURE/COLONY COUNT: CPT

## 2023-05-17 PROCEDURE — C9803 HOPD COVID-19 SPEC COLLECT: HCPCS | Performed by: INTERNAL MEDICINE

## 2023-05-17 PROCEDURE — 99215 OFFICE O/P EST HI 40 MIN: CPT | Performed by: REGISTERED NURSE

## 2023-05-17 PROCEDURE — 700111 HCHG RX REV CODE 636 W/ 250 OVERRIDE (IP): Performed by: INTERNAL MEDICINE

## 2023-05-17 PROCEDURE — 0241U HCHG SARS-COV-2 COVID-19 NFCT DS RESP RNA 4 TRGT MIC: CPT

## 2023-05-17 RX ORDER — TRIAMCINOLONE ACETONIDE 40 MG/ML
30 INJECTION, SUSPENSION INTRA-ARTICULAR; INTRAMUSCULAR ONCE
Status: ON HOLD | COMMUNITY
End: 2023-07-26

## 2023-05-17 RX ORDER — PROMETHAZINE HYDROCHLORIDE 25 MG/1
12.5-25 SUPPOSITORY RECTAL EVERY 4 HOURS PRN
Status: DISCONTINUED | OUTPATIENT
Start: 2023-05-17 | End: 2023-05-23 | Stop reason: HOSPADM

## 2023-05-17 RX ORDER — PROCHLORPERAZINE EDISYLATE 5 MG/ML
5-10 INJECTION INTRAMUSCULAR; INTRAVENOUS EVERY 4 HOURS PRN
Status: DISCONTINUED | OUTPATIENT
Start: 2023-05-17 | End: 2023-05-23 | Stop reason: HOSPADM

## 2023-05-17 RX ORDER — SODIUM CHLORIDE, SODIUM LACTATE, POTASSIUM CHLORIDE, AND CALCIUM CHLORIDE .6; .31; .03; .02 G/100ML; G/100ML; G/100ML; G/100ML
30 INJECTION, SOLUTION INTRAVENOUS ONCE
Status: ACTIVE | OUTPATIENT
Start: 2023-05-17 | End: 2023-05-18

## 2023-05-17 RX ORDER — PROMETHAZINE HYDROCHLORIDE 25 MG/1
12.5-25 TABLET ORAL EVERY 4 HOURS PRN
Status: DISCONTINUED | OUTPATIENT
Start: 2023-05-17 | End: 2023-05-23 | Stop reason: HOSPADM

## 2023-05-17 RX ORDER — ACETAMINOPHEN 325 MG/1
650 TABLET ORAL EVERY 6 HOURS PRN
Status: DISCONTINUED | OUTPATIENT
Start: 2023-05-17 | End: 2023-05-23 | Stop reason: HOSPADM

## 2023-05-17 RX ORDER — ONDANSETRON 2 MG/ML
4 INJECTION INTRAMUSCULAR; INTRAVENOUS ONCE
Status: COMPLETED | OUTPATIENT
Start: 2023-05-17 | End: 2023-05-17

## 2023-05-17 RX ORDER — ACETAMINOPHEN 500 MG
1500 TABLET ORAL
Status: ON HOLD | COMMUNITY
End: 2023-05-23 | Stop reason: SDUPTHER

## 2023-05-17 RX ORDER — BISACODYL 10 MG
10 SUPPOSITORY, RECTAL RECTAL
Status: DISCONTINUED | OUTPATIENT
Start: 2023-05-17 | End: 2023-05-23 | Stop reason: HOSPADM

## 2023-05-17 RX ORDER — MORPHINE SULFATE 4 MG/ML
2 INJECTION INTRAVENOUS
Status: DISCONTINUED | OUTPATIENT
Start: 2023-05-17 | End: 2023-05-23 | Stop reason: HOSPADM

## 2023-05-17 RX ORDER — HYDROCODONE BITARTRATE AND HOMATROPINE METHYLBROMIDE ORAL SOLUTION 5; 1.5 MG/5ML; MG/5ML
5 LIQUID ORAL EVERY 4 HOURS PRN
Status: DISCONTINUED | OUTPATIENT
Start: 2023-05-17 | End: 2023-05-23 | Stop reason: HOSPADM

## 2023-05-17 RX ORDER — QUETIAPINE FUMARATE 100 MG/1
100 TABLET, FILM COATED ORAL NIGHTLY PRN
COMMUNITY
Start: 2023-05-03 | End: 2023-08-08

## 2023-05-17 RX ORDER — ONDANSETRON 2 MG/ML
4 INJECTION INTRAMUSCULAR; INTRAVENOUS EVERY 4 HOURS PRN
Status: DISCONTINUED | OUTPATIENT
Start: 2023-05-17 | End: 2023-05-23 | Stop reason: HOSPADM

## 2023-05-17 RX ORDER — FELODIPINE 10 MG/1
10 TABLET, EXTENDED RELEASE ORAL DAILY
COMMUNITY
Start: 2023-05-03 | End: 2023-08-08

## 2023-05-17 RX ORDER — OXYCODONE HYDROCHLORIDE 5 MG/1
2.5 TABLET ORAL
Status: DISCONTINUED | OUTPATIENT
Start: 2023-05-17 | End: 2023-05-23 | Stop reason: HOSPADM

## 2023-05-17 RX ORDER — AZITHROMYCIN 250 MG/1
250-500 TABLET, FILM COATED ORAL DAILY
Status: ON HOLD | COMMUNITY
Start: 2023-05-03 | End: 2023-05-23

## 2023-05-17 RX ORDER — POLYETHYLENE GLYCOL 3350 17 G/17G
1 POWDER, FOR SOLUTION ORAL
Status: DISCONTINUED | OUTPATIENT
Start: 2023-05-17 | End: 2023-05-23 | Stop reason: HOSPADM

## 2023-05-17 RX ORDER — AMOXICILLIN 250 MG
2 CAPSULE ORAL 2 TIMES DAILY
Status: DISCONTINUED | OUTPATIENT
Start: 2023-05-17 | End: 2023-05-23 | Stop reason: HOSPADM

## 2023-05-17 RX ORDER — OXYCODONE HYDROCHLORIDE 5 MG/1
5 TABLET ORAL
Status: DISCONTINUED | OUTPATIENT
Start: 2023-05-17 | End: 2023-05-23 | Stop reason: HOSPADM

## 2023-05-17 RX ORDER — GUAIFENESIN AND PSEUDOEPHEDRINE HCL 1200; 120 MG/1; MG/1
1 TABLET, EXTENDED RELEASE ORAL DAILY
Status: ON HOLD | COMMUNITY
End: 2023-05-23

## 2023-05-17 RX ORDER — SODIUM CHLORIDE 9 MG/ML
INJECTION, SOLUTION INTRAVENOUS CONTINUOUS
Status: DISCONTINUED | OUTPATIENT
Start: 2023-05-17 | End: 2023-05-23

## 2023-05-17 RX ORDER — CETIRIZINE HYDROCHLORIDE 10 MG/1
10 TABLET ORAL
Status: ON HOLD | COMMUNITY
End: 2023-07-26

## 2023-05-17 RX ORDER — ALLOPURINOL 100 MG/1
100 TABLET ORAL DAILY
Status: DISCONTINUED | OUTPATIENT
Start: 2023-05-18 | End: 2023-05-23 | Stop reason: HOSPADM

## 2023-05-17 RX ORDER — IBUPROFEN 200 MG
600 TABLET ORAL
Status: ON HOLD | COMMUNITY
End: 2023-07-26

## 2023-05-17 RX ORDER — MORPHINE SULFATE 4 MG/ML
4 INJECTION INTRAVENOUS ONCE
Status: COMPLETED | OUTPATIENT
Start: 2023-05-17 | End: 2023-05-17

## 2023-05-17 RX ORDER — FLUOXETINE HYDROCHLORIDE 20 MG/1
40 CAPSULE ORAL DAILY
COMMUNITY
Start: 2023-05-03 | End: 2023-08-08

## 2023-05-17 RX ORDER — TELMISARTAN 40 MG/1
40 TABLET ORAL DAILY
COMMUNITY
Start: 2023-05-05 | End: 2023-08-08

## 2023-05-17 RX ORDER — ENOXAPARIN SODIUM 100 MG/ML
40 INJECTION SUBCUTANEOUS DAILY
Status: DISCONTINUED | OUTPATIENT
Start: 2023-05-18 | End: 2023-05-23 | Stop reason: HOSPADM

## 2023-05-17 RX ORDER — ALBUTEROL SULFATE 90 UG/1
2 AEROSOL, METERED RESPIRATORY (INHALATION) EVERY 4 HOURS PRN
Status: DISCONTINUED | OUTPATIENT
Start: 2023-05-17 | End: 2023-05-23 | Stop reason: HOSPADM

## 2023-05-17 RX ORDER — ONDANSETRON 4 MG/1
4 TABLET, ORALLY DISINTEGRATING ORAL EVERY 4 HOURS PRN
Status: DISCONTINUED | OUTPATIENT
Start: 2023-05-17 | End: 2023-05-23 | Stop reason: HOSPADM

## 2023-05-17 RX ORDER — LABETALOL HYDROCHLORIDE 5 MG/ML
10 INJECTION, SOLUTION INTRAVENOUS EVERY 4 HOURS PRN
Status: DISCONTINUED | OUTPATIENT
Start: 2023-05-17 | End: 2023-05-23 | Stop reason: HOSPADM

## 2023-05-17 RX ADMIN — SODIUM CHLORIDE: 9 INJECTION, SOLUTION INTRAVENOUS at 23:02

## 2023-05-17 RX ADMIN — IOHEXOL 100 ML: 350 INJECTION, SOLUTION INTRAVENOUS at 16:30

## 2023-05-17 RX ADMIN — ONDANSETRON 4 MG: 2 INJECTION INTRAMUSCULAR; INTRAVENOUS at 22:44

## 2023-05-17 RX ADMIN — FAMOTIDINE 20 MG: 10 INJECTION INTRAVENOUS at 22:45

## 2023-05-17 RX ADMIN — PIPERACILLIN AND TAZOBACTAM 4.5 G: 4; .5 INJECTION, POWDER, FOR SOLUTION INTRAVENOUS at 23:18

## 2023-05-17 RX ADMIN — MORPHINE SULFATE 4 MG: 4 INJECTION INTRAVENOUS at 22:44

## 2023-05-17 ASSESSMENT — LIFESTYLE VARIABLES
TOTAL SCORE: 0
HAVE PEOPLE ANNOYED YOU BY CRITICIZING YOUR DRINKING: NO
EVER FELT BAD OR GUILTY ABOUT YOUR DRINKING: NO
TOTAL SCORE: 0
HAVE YOU EVER FELT YOU SHOULD CUT DOWN ON YOUR DRINKING: NO
CONSUMPTION TOTAL: INCOMPLETE
ALCOHOL_USE: YES
EVER HAD A DRINK FIRST THING IN THE MORNING TO STEADY YOUR NERVES TO GET RID OF A HANGOVER: NO
TOTAL SCORE: 0

## 2023-05-17 ASSESSMENT — ENCOUNTER SYMPTOMS
COUGH: 0
SHORTNESS OF BREATH: 0
NECK PAIN: 0
VOMITING: 0
BLURRED VISION: 0
NAUSEA: 1
PALPITATIONS: 0
DIZZINESS: 0
DIARRHEA: 0
EYE PAIN: 0
ABDOMINAL PAIN: 1
CHILLS: 0
WEAKNESS: 0
FEVER: 0
EYE DISCHARGE: 0

## 2023-05-17 ASSESSMENT — PAIN DESCRIPTION - PAIN TYPE
TYPE: ACUTE PAIN
TYPE: ACUTE PAIN

## 2023-05-17 ASSESSMENT — COGNITIVE AND FUNCTIONAL STATUS - GENERAL
MOBILITY SCORE: 24
SUGGESTED CMS G CODE MODIFIER DAILY ACTIVITY: CH
SUGGESTED CMS G CODE MODIFIER MOBILITY: CH
DAILY ACTIVITIY SCORE: 24

## 2023-05-17 ASSESSMENT — PATIENT HEALTH QUESTIONNAIRE - PHQ9
2. FEELING DOWN, DEPRESSED, IRRITABLE, OR HOPELESS: NOT AT ALL
1. LITTLE INTEREST OR PLEASURE IN DOING THINGS: NOT AT ALL
SUM OF ALL RESPONSES TO PHQ9 QUESTIONS 1 AND 2: 0

## 2023-05-17 ASSESSMENT — FIBROSIS 4 INDEX
FIB4 SCORE: 2.8
FIB4 SCORE: 0.65
FIB4 SCORE: 2.8

## 2023-05-17 NOTE — LETTER
May 17, 2023         Patient: River Olson   YOB: 1980   Date of Visit: 5/17/2023           To Whom it May Concern:    River Olson was seen in my clinic on 5/17/2023. He may return to work on 05/23/23.    If you have any questions or concerns, please don't hesitate to call.        Sincerely,           KEATON Gutiérrez  Electronically Signed

## 2023-05-17 NOTE — PROGRESS NOTES
Chief Complaint   Patient presents with    Abdominal Pain     X 3 days left lower abdominal pain now all towards belly area      HPI:   River Olson is a 42 y.o. male presenting with abdominal pain    Onset: 3 days ago  Location: LLQ  Duration: abrupt  Characteristics: constant and worsening  Alleviating & Aggravating factors: laying still helps, movement and coughing worsens  Associated symptoms: None  Activity at onset: Started when he was leaving sons soccer practice  Radiation: none  Severity: ranges from dull to 9/10 at times    Pertinent history: Recently treated for Pneumonia  Immunizations: Reported current    Social History     Tobacco Use    Smoking status: Former     Packs/day: 0.25     Years: 17.00     Pack years: 4.25     Types: Cigarettes     Start date: 10/10/2000     Quit date: 10/10/2017     Years since quittin.6    Smokeless tobacco: Former     Types: Chew    Tobacco comments:     quit 2011   Vaping Use    Vaping Use: Never used   Substance Use Topics    Alcohol use: Yes     Comment: 2 shoots per day liquer vodka    Drug use: No       No Known Allergies    Patient Active Problem List    Diagnosis Date Noted    Abnormal ECG during exercise stress test 2022    Gout of multiple sites 2021    Snores 2021    H/O thalassemia minor 2017    Contraindication to deep vein thrombosis (DVT) prophylaxis 2017    Abnormal liver enzymes 2017    Essential hypertension, benign 2016       Current Outpatient Medications Ordered in Epic   Medication Sig Dispense Refill    losartan-hydrochlorothiazide (HYZAAR) 100-12.5 MG per tablet Take 1 Tablet by mouth every day. 90 Tablet 0    allopurinol (ZYLOPRIM) 100 MG Tab TAKE 1 TABLET BY MOUTH EVERY DAY 60 Tablet 0    albuterol 108 (90 Base) MCG/ACT Aero Soln inhalation aerosol Inhale 2 Puffs every four hours as needed for Shortness of Breath. 1 Each 0     No current [facility]-ordered facility-administered medications on  file.     Review of Systems   Constitutional:  Negative for chills and fever.   Eyes:  Negative for blurred vision, pain and discharge.   Respiratory:  Negative for cough and shortness of breath.    Cardiovascular:  Negative for chest pain, palpitations and leg swelling.   Gastrointestinal:  Positive for abdominal pain and nausea. Negative for diarrhea and vomiting.   Musculoskeletal:  Negative for neck pain.   Skin:  Negative for rash.   Neurological:  Negative for dizziness and weakness.        Vitals:    05/17/23 1422   BP: 100/60   Pulse: 100   Resp: 16   Temp: 37.2 °C (99 °F)   SpO2: 96%       Physical Exam  Vitals and nursing note reviewed.   Constitutional:       General: He is not in acute distress.     Appearance: Normal appearance. He is well-developed. He is not ill-appearing, toxic-appearing or diaphoretic.   HENT:      Head: Normocephalic and atraumatic.      Right Ear: Tympanic membrane, ear canal and external ear normal.      Left Ear: Tympanic membrane, ear canal and external ear normal.      Nose: Nose normal. No congestion or rhinorrhea.      Mouth/Throat:      Mouth: Mucous membranes are moist.      Pharynx: Oropharynx is clear. No oropharyngeal exudate or posterior oropharyngeal erythema.   Eyes:      General:         Right eye: No discharge.         Left eye: No discharge.      Conjunctiva/sclera: Conjunctivae normal.   Cardiovascular:      Rate and Rhythm: Normal rate and regular rhythm.      Pulses: Normal pulses.      Heart sounds: Normal heart sounds. No murmur heard.  Pulmonary:      Effort: Pulmonary effort is normal. No respiratory distress.      Breath sounds: Normal breath sounds. No wheezing, rhonchi or rales.   Chest:      Chest wall: No tenderness.   Abdominal:      General: Abdomen is flat.      Tenderness: There is abdominal tenderness in the left lower quadrant. There is no right CVA tenderness, left CVA tenderness or guarding. Negative signs include McBurney's sign.        Musculoskeletal:         General: No swelling or tenderness.      Cervical back: Normal range of motion and neck supple.      Right lower leg: No edema.      Left lower leg: No edema.   Lymphadenopathy:      Cervical: No cervical adenopathy.   Skin:     General: Skin is warm and dry.   Neurological:      General: No focal deficit present.      Mental Status: He is alert and oriented to person, place, and time. Mental status is at baseline.   Psychiatric:         Mood and Affect: Mood normal.         Behavior: Behavior normal.         Thought Content: Thought content normal.         Judgment: Judgment normal.       Assessment/Plan:  1. LLQ abdominal pain  POCT Urinalysis    CT-ABDOMEN WITH    CANCELED: CT-ABDOMEN WITH      Vital signs fairly normal, borderline low BP at 100/60. UA positive for glucose, bilirubin, protein, Nitrites. Marked tenderness LLQ. No CVA tenderness bilaterally.  CT scan positive for severe diverticulitis, also possible abscess, no perforation.  These findings were relayed to the patient who is still here in clinic.  He is going to go directly to the emergency department for further management.  He is going POV.    Return to clinic or go to ED if symptoms worsen or persist. Indications for ED discussed at length. Patient/Parent/Guardian voices understanding. Follow-up with your primary care provider in 3-5 days. Red flag symptoms discussed. All side effects of medication discussed including allergic response, GI upset, tendon injury, rash, sedation etc.    I personally reviewed prior external notes and test results pertinent to today's visit as well as additional imaging and testing completed in clinic today.     Please note that this dictation was created using voice recognition software. I have made every reasonable attempt to correct obvious errors, but I expect that there are errors of grammar and possibly content that I did not discover before finalizing the note.

## 2023-05-18 ENCOUNTER — APPOINTMENT (OUTPATIENT)
Dept: RADIOLOGY | Facility: MEDICAL CENTER | Age: 43
DRG: 392 | End: 2023-05-18
Attending: INTERNAL MEDICINE
Payer: COMMERCIAL

## 2023-05-18 DIAGNOSIS — K57.92 DIVERTICULITIS: ICD-10-CM

## 2023-05-18 PROBLEM — D64.9 ANEMIA: Status: ACTIVE | Noted: 2023-05-18

## 2023-05-18 PROBLEM — R05.9 COUGH: Status: ACTIVE | Noted: 2023-05-18

## 2023-05-18 PROBLEM — R73.9 HYPERGLYCEMIA: Status: ACTIVE | Noted: 2023-05-18

## 2023-05-18 PROBLEM — E87.1 HYPONATREMIA: Status: ACTIVE | Noted: 2023-05-18

## 2023-05-18 PROBLEM — R16.1 SPLENOMEGALY: Status: ACTIVE | Noted: 2023-05-18

## 2023-05-18 PROBLEM — E80.6 BILIRUBINEMIA: Status: ACTIVE | Noted: 2023-05-18

## 2023-05-18 PROBLEM — D50.9 IRON DEFICIENCY ANEMIA: Status: ACTIVE | Noted: 2023-05-18

## 2023-05-18 PROBLEM — D64.9 ANEMIA: Status: RESOLVED | Noted: 2023-05-18 | Resolved: 2023-05-18

## 2023-05-18 LAB
ALBUMIN SERPL BCP-MCNC: 3.2 G/DL (ref 3.2–4.9)
ALBUMIN/GLOB SERPL: 1 G/DL
ALP SERPL-CCNC: 104 U/L (ref 30–99)
ALT SERPL-CCNC: 35 U/L (ref 2–50)
ANION GAP SERPL CALC-SCNC: 12 MMOL/L (ref 7–16)
AST SERPL-CCNC: 50 U/L (ref 12–45)
BASOPHILS # BLD AUTO: 0.2 % (ref 0–1.8)
BASOPHILS # BLD: 0.02 K/UL (ref 0–0.12)
BILIRUB SERPL-MCNC: 5.3 MG/DL (ref 0.1–1.5)
BUN SERPL-MCNC: 13 MG/DL (ref 8–22)
CALCIUM ALBUM COR SERPL-MCNC: 8.6 MG/DL (ref 8.5–10.5)
CALCIUM SERPL-MCNC: 8 MG/DL (ref 8.5–10.5)
CHLORIDE SERPL-SCNC: 97 MMOL/L (ref 96–112)
CO2 SERPL-SCNC: 21 MMOL/L (ref 20–33)
CREAT SERPL-MCNC: 0.57 MG/DL (ref 0.5–1.4)
EKG IMPRESSION: NORMAL
EOSINOPHIL # BLD AUTO: 0.02 K/UL (ref 0–0.51)
EOSINOPHIL NFR BLD: 0.2 % (ref 0–6.9)
ERYTHROCYTE [DISTWIDTH] IN BLOOD BY AUTOMATED COUNT: 45.1 FL (ref 35.9–50)
FERRITIN SERPL-MCNC: 886 NG/ML (ref 22–322)
FLUAV RNA SPEC QL NAA+PROBE: NEGATIVE
FLUBV RNA SPEC QL NAA+PROBE: NEGATIVE
GFR SERPLBLD CREATININE-BSD FMLA CKD-EPI: 124 ML/MIN/1.73 M 2
GLOBULIN SER CALC-MCNC: 3.3 G/DL (ref 1.9–3.5)
GLUCOSE SERPL-MCNC: 101 MG/DL (ref 65–99)
GRAM STN SPEC: NORMAL
HCT VFR BLD AUTO: 28.8 % (ref 42–52)
HGB BLD-MCNC: 9 G/DL (ref 14–18)
IMM GRANULOCYTES # BLD AUTO: 0.09 K/UL (ref 0–0.11)
IMM GRANULOCYTES NFR BLD AUTO: 0.8 % (ref 0–0.9)
INR PPP: 1.47 (ref 0.87–1.13)
LACTATE SERPL-SCNC: 0.7 MMOL/L (ref 0.5–2)
LACTATE SERPL-SCNC: 0.8 MMOL/L (ref 0.5–2)
LYMPHOCYTES # BLD AUTO: 1.76 K/UL (ref 1–4.8)
LYMPHOCYTES NFR BLD: 15.5 % (ref 22–41)
MCH RBC QN AUTO: 22.6 PG (ref 27–33)
MCHC RBC AUTO-ENTMCNC: 31.3 G/DL (ref 33.7–35.3)
MCV RBC AUTO: 72.4 FL (ref 81.4–97.8)
MONOCYTES # BLD AUTO: 1.21 K/UL (ref 0–0.85)
MONOCYTES NFR BLD AUTO: 10.6 % (ref 0–13.4)
NEUTROPHILS # BLD AUTO: 8.29 K/UL (ref 1.82–7.42)
NEUTROPHILS NFR BLD: 72.7 % (ref 44–72)
NRBC # BLD AUTO: 0 K/UL
NRBC BLD-RTO: 0 /100 WBC
PLATELET # BLD AUTO: 254 K/UL (ref 164–446)
POTASSIUM SERPL-SCNC: 3.7 MMOL/L (ref 3.6–5.5)
PROT SERPL-MCNC: 6.5 G/DL (ref 6–8.2)
PROTHROMBIN TIME: 17.6 SEC (ref 12–14.6)
RBC # BLD AUTO: 3.98 M/UL (ref 4.7–6.1)
RSV RNA SPEC QL NAA+PROBE: NEGATIVE
SARS-COV-2 RNA RESP QL NAA+PROBE: NOTDETECTED
SIGNIFICANT IND 70042: NORMAL
SITE SITE: NORMAL
SODIUM SERPL-SCNC: 130 MMOL/L (ref 135–145)
SOURCE SOURCE: NORMAL
SPECIMEN SOURCE: NORMAL
VIT B12 SERPL-MCNC: 392 PG/ML (ref 211–911)
WBC # BLD AUTO: 11.4 K/UL (ref 4.8–10.8)

## 2023-05-18 PROCEDURE — 700105 HCHG RX REV CODE 258: Performed by: INTERNAL MEDICINE

## 2023-05-18 PROCEDURE — 4410213 CT-DRAIN-PERITONEAL

## 2023-05-18 PROCEDURE — 93005 ELECTROCARDIOGRAM TRACING: CPT | Performed by: RADIOLOGY

## 2023-05-18 PROCEDURE — 700111 HCHG RX REV CODE 636 W/ 250 OVERRIDE (IP): Performed by: RADIOLOGY

## 2023-05-18 PROCEDURE — 85610 PROTHROMBIN TIME: CPT

## 2023-05-18 PROCEDURE — 87205 SMEAR GRAM STAIN: CPT

## 2023-05-18 PROCEDURE — 700102 HCHG RX REV CODE 250 W/ 637 OVERRIDE(OP): Performed by: INTERNAL MEDICINE

## 2023-05-18 PROCEDURE — 87077 CULTURE AEROBIC IDENTIFY: CPT

## 2023-05-18 PROCEDURE — 36415 COLL VENOUS BLD VENIPUNCTURE: CPT

## 2023-05-18 PROCEDURE — 87070 CULTURE OTHR SPECIMN AEROBIC: CPT

## 2023-05-18 PROCEDURE — 700111 HCHG RX REV CODE 636 W/ 250 OVERRIDE (IP): Performed by: INTERNAL MEDICINE

## 2023-05-18 PROCEDURE — 700111 HCHG RX REV CODE 636 W/ 250 OVERRIDE (IP)

## 2023-05-18 PROCEDURE — 0W9J30Z DRAINAGE OF PELVIC CAVITY WITH DRAINAGE DEVICE, PERCUTANEOUS APPROACH: ICD-10-PCS | Performed by: RADIOLOGY

## 2023-05-18 PROCEDURE — 99233 SBSQ HOSP IP/OBS HIGH 50: CPT | Performed by: STUDENT IN AN ORGANIZED HEALTH CARE EDUCATION/TRAINING PROGRAM

## 2023-05-18 PROCEDURE — 87076 CULTURE ANAEROBE IDENT EACH: CPT | Mod: 91

## 2023-05-18 PROCEDURE — 80053 COMPREHEN METABOLIC PANEL: CPT

## 2023-05-18 PROCEDURE — A9270 NON-COVERED ITEM OR SERVICE: HCPCS | Performed by: INTERNAL MEDICINE

## 2023-05-18 PROCEDURE — 93010 ELECTROCARDIOGRAM REPORT: CPT | Performed by: INTERNAL MEDICINE

## 2023-05-18 PROCEDURE — 83605 ASSAY OF LACTIC ACID: CPT

## 2023-05-18 PROCEDURE — 85025 COMPLETE CBC W/AUTO DIFF WBC: CPT

## 2023-05-18 PROCEDURE — 770006 HCHG ROOM/CARE - MED/SURG/GYN SEMI*

## 2023-05-18 RX ORDER — SODIUM CHLORIDE 9 MG/ML
500 INJECTION, SOLUTION INTRAVENOUS
Status: ACTIVE | OUTPATIENT
Start: 2023-05-18 | End: 2023-05-18

## 2023-05-18 RX ORDER — FERROUS SULFATE 325(65) MG
325 TABLET ORAL
Status: DISCONTINUED | OUTPATIENT
Start: 2023-05-19 | End: 2023-05-19

## 2023-05-18 RX ORDER — MIDAZOLAM HYDROCHLORIDE 1 MG/ML
INJECTION INTRAMUSCULAR; INTRAVENOUS
Status: COMPLETED
Start: 2023-05-18 | End: 2023-05-18

## 2023-05-18 RX ORDER — ONDANSETRON 2 MG/ML
4 INJECTION INTRAMUSCULAR; INTRAVENOUS PRN
Status: ACTIVE | OUTPATIENT
Start: 2023-05-18 | End: 2023-05-18

## 2023-05-18 RX ORDER — MIDAZOLAM HYDROCHLORIDE 1 MG/ML
.5-2 INJECTION INTRAMUSCULAR; INTRAVENOUS PRN
Status: ACTIVE | OUTPATIENT
Start: 2023-05-18 | End: 2023-05-18

## 2023-05-18 RX ADMIN — FENTANYL CITRATE 50 MCG: 50 INJECTION, SOLUTION INTRAMUSCULAR; INTRAVENOUS at 09:49

## 2023-05-18 RX ADMIN — OXYCODONE 5 MG: 5 TABLET ORAL at 08:32

## 2023-05-18 RX ADMIN — OXYCODONE 2.5 MG: 5 TABLET ORAL at 05:07

## 2023-05-18 RX ADMIN — SENNOSIDES AND DOCUSATE SODIUM 2 TABLET: 50; 8.6 TABLET ORAL at 17:32

## 2023-05-18 RX ADMIN — OXYCODONE 5 MG: 5 TABLET ORAL at 16:56

## 2023-05-18 RX ADMIN — FAMOTIDINE 20 MG: 10 INJECTION INTRAVENOUS at 17:32

## 2023-05-18 RX ADMIN — PIPERACILLIN AND TAZOBACTAM 4.5 G: 4; .5 INJECTION, POWDER, FOR SOLUTION INTRAVENOUS at 12:24

## 2023-05-18 RX ADMIN — SODIUM CHLORIDE: 9 INJECTION, SOLUTION INTRAVENOUS at 20:29

## 2023-05-18 RX ADMIN — ALLOPURINOL 100 MG: 100 TABLET ORAL at 05:03

## 2023-05-18 RX ADMIN — MIDAZOLAM 2 MG: 1 INJECTION, SOLUTION INTRAMUSCULAR; INTRAVENOUS at 09:41

## 2023-05-18 RX ADMIN — FENTANYL CITRATE 50 MCG: 50 INJECTION, SOLUTION INTRAMUSCULAR; INTRAVENOUS at 09:41

## 2023-05-18 RX ADMIN — PIPERACILLIN AND TAZOBACTAM 4.5 G: 4; .5 INJECTION, POWDER, FOR SOLUTION INTRAVENOUS at 01:37

## 2023-05-18 RX ADMIN — FAMOTIDINE 20 MG: 10 INJECTION INTRAVENOUS at 05:03

## 2023-05-18 RX ADMIN — MIDAZOLAM HYDROCHLORIDE 2 MG: 2 INJECTION, SOLUTION INTRAMUSCULAR; INTRAVENOUS at 09:41

## 2023-05-18 RX ADMIN — PIPERACILLIN AND TAZOBACTAM 4.5 G: 4; .5 INJECTION, POWDER, FOR SOLUTION INTRAVENOUS at 22:20

## 2023-05-18 RX ADMIN — MIDAZOLAM 1 MG: 1 INJECTION, SOLUTION INTRAMUSCULAR; INTRAVENOUS at 09:44

## 2023-05-18 RX ADMIN — ENOXAPARIN SODIUM 40 MG: 100 INJECTION SUBCUTANEOUS at 17:32

## 2023-05-18 RX ADMIN — MIDAZOLAM 1 MG: 1 INJECTION, SOLUTION INTRAMUSCULAR; INTRAVENOUS at 09:49

## 2023-05-18 ASSESSMENT — PAIN DESCRIPTION - PAIN TYPE
TYPE: ACUTE PAIN
TYPE: ACUTE PAIN;SURGICAL PAIN
TYPE: ACUTE PAIN

## 2023-05-18 ASSESSMENT — ENCOUNTER SYMPTOMS
ABDOMINAL PAIN: 1
HALLUCINATIONS: 0
FLANK PAIN: 0
PHOTOPHOBIA: 0
SPEECH CHANGE: 0
HEADACHES: 0
NECK PAIN: 0
DOUBLE VISION: 0
WEIGHT LOSS: 0
CHILLS: 0
HEARTBURN: 0
FOCAL WEAKNESS: 0
BLURRED VISION: 0
TREMORS: 0
HEMOPTYSIS: 0
BACK PAIN: 0
NAUSEA: 0
BRUISES/BLEEDS EASILY: 0
VOMITING: 0
ORTHOPNEA: 0
FEVER: 1
SPUTUM PRODUCTION: 1
PALPITATIONS: 0
COUGH: 1
POLYDIPSIA: 0
NERVOUS/ANXIOUS: 0

## 2023-05-18 ASSESSMENT — LIFESTYLE VARIABLES: SUBSTANCE_ABUSE: 0

## 2023-05-18 NOTE — ASSESSMENT & PLAN NOTE
Chronic.  CT of the abdomen showed status postcholecystectomy.      5/18 Bilirubin 5.3 (was 4.8 10mo ago)  AST/ALT 50/35    Resolved  RUQ ultrasound showed no acute pathology, consistent with hepatic steatosis

## 2023-05-18 NOTE — ASSESSMENT & PLAN NOTE
Severe iron deficiency, ferritin is high due to infection  Likely due to thalassemia  Ferritin high    Hold iron replacement given thalassemia and the concern of iron overload

## 2023-05-18 NOTE — ED TRIAGE NOTES
Chief Complaint   Patient presents with    Abdominal Pain     Since 1930 pm Monday. Denies n/v/d.     Sent from Urgent Care     Pt was sent here for diverticulitis per      Pt ambulatory to triage for above complaint. Pt reports he was told he has diverticulitis. Pt denies any history of such. Pt appears uncomfortable.

## 2023-05-18 NOTE — PROGRESS NOTES
Received the patient to the floor by transport staff. Patient ambulated from the bed to the [facility] with no need for assistance. Patient is A&Ox4, on RA, and reports pain of 8/10 at this time. RN waiting for patient to be transferred over in the system to provide medication. Patient assessment completed, 2RN skin check completed, admit profile completed. Bed in low and locked position, call light and personal belongings within reach, patient expresses no further needs at this time.

## 2023-05-18 NOTE — ASSESSMENT & PLAN NOTE
Patient reportedly was treated for pneumonia last week and still has residual COVID causing worsening of abdominal pain during the bout of cough  Chest x-ray showed some left lower lobe opacification, likely atelectasis per radiology report  Will check COVID-19/influenza/RSV  Cough suppressant as needed

## 2023-05-18 NOTE — CARE PLAN
The patient is Stable - Low risk of patient condition declining or worsening    Shift Goals  Clinical Goals: IV antibiotics  Patient Goals: Comfort, pain management    Progress made toward(s) clinical / shift goals:  Patient received IV antibiotics as ordered to treat infection. Patient was NPO after midnight to prepare for his drain placement later today. Patient received pain medications as needed, which allowed him to sleep comfortably.       Problem: Knowledge Deficit - Standard  Goal: Patient and family/care givers will demonstrate understanding of plan of care, disease process/condition, diagnostic tests and medications  Outcome: Progressing       Patient is not progressing towards the following goals:

## 2023-05-18 NOTE — ED NOTES
Med Rec complete per patient  Allergies reviewed  Preferred Pharmacy: [facility] [place]

## 2023-05-18 NOTE — ASSESSMENT & PLAN NOTE
Appears chronic.  Hemoglobin 9.9  Denies blood in stool or melena  Reticulocyte count is elevated  History of thalassemia minor  Splenomegaly on CT  Follow-up with PCP

## 2023-05-18 NOTE — ED PROVIDER NOTES
ER Provider Note    Scribed for Dr. Lavell Haile M.D. by Sukhdeep Starr. 5/17/2023  8:53 PM    Primary Care Provider: Pcp Pt States None    CHIEF COMPLAINT  Chief Complaint   Patient presents with    Abdominal Pain     Since 1930 pm Monday. Denies n/v/d.     Sent from Urgent Care     Pt was sent here for diverticulitis per        EXTERNAL RECORDS REVIEWED  Outpatient Notes Patient was seen at , where he was diagnosed with a diverticular abscess     HPI/ROS  LIMITATION TO HISTORY   Select: : None    OUTSIDE HISTORIAN(S):  None.    River Olson is a 42 y.o. male who presents to the ED for evaluation of abdominal pain onset three days ago. Patient reports that when his pain came on, it was intense and immediate. He the reported to Urgent Care, where he was diagnosed with a diverticular abscess. Patient is currently in discomfort, and would like medication to help his current pain.     PAST MEDICAL HISTORY  Past Medical History:   Diagnosis Date    ASTHMA     Hypertension 2014    Infectious disease age 6    chicken pox    Thalassanemia 1979       SURGICAL HISTORY  Past Surgical History:   Procedure Laterality Date    OTHER  greater than 10 years    gall bladder removed       FAMILY HISTORY  Family History   Problem Relation Age of Onset    Alcohol abuse Mother     Diabetes Maternal Grandfather     Heart Disease Maternal Grandfather        SOCIAL HISTORY   reports that he quit smoking about 5 years ago. His smoking use included cigarettes. He started smoking about 22 years ago. He has a 4.25 pack-year smoking history. He has quit using smokeless tobacco.  His smokeless tobacco use included chew. He reports current alcohol use. He reports that he does not use drugs.    CURRENT MEDICATIONS  Current Discharge Medication List        CONTINUE these medications which have NOT CHANGED    Details   triamcinolone acetonide (KENALOG-40) 40 MG/ML Suspension Inject 30 mg into the shoulder, thigh, or buttocks one  "time.      cetirizine (ZYRTEC) 10 MG Tab Take 10 mg by mouth 1 time a day as needed for Allergies.      Pseudoephedrine-Guaifenesin (MUCINEX D MAX STRENGTH) 120-1200 MG TABLET SR 12 HR Take 1 Tablet by mouth every day.      ibuprofen (MOTRIN) 200 MG Tab Take 600 mg by mouth 1 time a day as needed for Mild Pain.      acetaminophen (TYLENOL) 500 MG Tab Take 1,500 mg by mouth 1 time a day as needed for Mild Pain.      azithromycin (ZITHROMAX) 250 MG Tab Take 250-500 mg by mouth every day. Take 2 tablets on day one, then take 1 tablet every day for 4 days      felodipine ER (PLENDIL) 10 MG TABLET SR 24 HR Take 10 mg by mouth every day.      FLUoxetine (PROZAC) 20 MG Cap Take 20 mg by mouth every day.      QUEtiapine (SEROQUEL) 50 MG tablet Take  mg by mouth at bedtime. 1 to 3 tablets      telmisartan (MICARDIS) 40 MG Tab Take 40 mg by mouth every day.      allopurinol (ZYLOPRIM) 100 MG Tab TAKE 1 TABLET BY MOUTH EVERY DAY  Qty: 60 Tablet, Refills: 0      albuterol 108 (90 Base) MCG/ACT Aero Soln inhalation aerosol Inhale 2 Puffs every four hours as needed for Shortness of Breath.  Qty: 1 Each, Refills: 0    Associated Diagnoses: Mild persistent asthma with exacerbation             ALLERGIES  Patient has no known allergies.    PHYSICAL EXAM  /57   Pulse (!) 101   Temp 36.3 °C (97.3 °F) (Temporal)   Resp 18   Ht 1.803 m (5' 11\")   Wt 92.4 kg (203 lb 11.3 oz)   SpO2 93%   BMI 28.41 kg/m²   Constitutional: Alert in no apparent distress.  HENT: No signs of trauma, Bilateral external ears normal, Nose normal.   Eyes: Pupils are equal and reactive, Conjunctiva normal, Non-icteric.   Neck: Normal range of motion, No tenderness, Supple, No stridor.   Lymphatic: No lymphadenopathy noted.   Cardiovascular: tachycardic with rhythm, no murmurs.   Thorax & Lungs: Normal breath sounds, No respiratory distress, No wheezing, No chest tenderness.   Abdomen: Bowel sounds normal, Soft, Tenderness to palpation in the left " "lower quadrant, No masses, No pulsatile masses. No peritoneal signs.  Skin: Warm, Dry, No erythema, No rash.   Back: No bony tenderness, No CVA tenderness.   Extremities: Intact distal pulses, No edema, No tenderness, No cyanosis.  Musculoskeletal: Good range of motion in all major joints. No tenderness to palpation or major deformities noted.   Neurologic: Alert , Normal motor function, Normal sensory function, No focal deficits noted.   Psychiatric: Affect normal, Judgment normal, Mood normal.     DIAGNOSTIC STUDIES & PROCEDURES    Labs:   Labs Reviewed   CBC WITH DIFFERENTIAL - Abnormal; Notable for the following components:       Result Value    WBC 11.8 (*)     RBC 4.34 (*)     Hemoglobin 9.9 (*)     Hematocrit 31.8 (*)     MCV 73.3 (*)     MCH 22.8 (*)     MCHC 31.1 (*)     Neutrophils-Polys 77.70 (*)     Lymphocytes 13.50 (*)     Neutrophils (Absolute) 9.13 (*)     Monos (Absolute) 0.92 (*)     All other components within normal limits   COMP METABOLIC PANEL - Abnormal; Notable for the following components:    Sodium 127 (*)     Chloride 94 (*)     Glucose 145 (*)     Calcium 8.4 (*)     Total Bilirubin 3.6 (*)     Globulin 3.8 (*)     All other components within normal limits   IRON/TOTAL IRON BIND - Abnormal; Notable for the following components:    Iron 15 (*)     Total Iron Binding 209 (*)     % Saturation 7 (*)     All other components within normal limits   RETICULOCYTES COUNT - Abnormal; Notable for the following components:    Reticulocyte Count 3.8 (*)     Retic, Absolute 0.15 (*)     Imm. Reticulocyte Fraction 22.0 (*)     Retic Hgb Equivalent 20.9 (*)     All other components within normal limits   LIPASE   CORRECTED CALCIUM   ESTIMATED GFR   URINALYSIS   BLOOD CULTURE    Narrative:     Per Hospital Policy: Only change Specimen Src: to \"Line\" if  specified by physician order.   BLOOD CULTURE    Narrative:     Per Hospital Policy: Only change Specimen Src: to \"Line\" if  specified by physician order. "   BLOOD CULTURE   BLOOD CULTURE   COV-2, FLU A/B, AND RSV BY PCR (CEPHEID)   FERRITIN   LACTIC ACID   LACTIC ACID   VITAMIN B12   PROTHROMBIN TIME   CBC WITH DIFFERENTIAL   COMP METABOLIC PANEL      All labs reviewed by me.    COURSE & MEDICAL DECISION MAKING    ED Observation Status? Yes; I am placing the patient in to an observation status due to a diagnostic uncertainty as well as therapeutic intensity. Patient placed in observation status at 9:01 PM, 5/17/2023.     Observation plan is as follows: Monitor patient on pain medication and reevaluate following lab work and imaging.     Upon Reevaluation, the patient's condition has: not improved; and will be escalated to hospitalization.    INITIAL ASSESSMENT AND PLAN  Care Narrative:       8:53 PM - Patient seen and evaluated at bedside. Discussed plan of care, including admission today. Patient agrees to plan of care. Patient will be treated with Zofran 4 mg, Morphine 4 mg for his symptoms. Ordered UA, Lipase, CMP, CBC with differential, and Corrected calcium to evaluate.    9:01 PM I discussed the patient's case and the above findings with the hospitalist who agrees with the plan for admission.     HYDRATION: Based on the patient's presentation of Abnormal Fluid Loss the patient was given IV fluids. IV Hydration was used because oral hydration was not adequate alone. Upon recheck following hydration, the patient was improved.    ADDITIONAL PROBLEM LIST AND DISPOSITION  Patient with diverticulitis and abscess.  He is hyponatremic.  He is a leukocytosis.  Patient is also anemic.  His lactic acid is normal.  Blood cultures were sent.  Patient admitted to hospitalist in guarded condition.  IV antibiotics been initiated.               DISPOSITION AND DISCUSSIONS  I have discussed management of the patient with the following physicians and CHLOE's: Dr. Bernal (Admitting hospitalist)    Discussion of management with other \[facility]\"" or appropriate source(s): None        Barriers to care at this time, including but not limited to: Patient does not have established PCP.     Decision tools and prescription drugs considered including, but not limited to: Antibiotics IV antibiotics given. .    The total critical care time on this patient is 35 minutes, resuscitating patient, speaking with admitting physician, and deciphering test results. This  is exclusive of separately billable procedures.      DISPOSITION:  Patient will be hospitalized by Dr. Bernal in guarded condition.     FINAL IMPRESSION   1. Diverticulitis    2. Colonic diverticular abscess         Sukhdeep JACKSON (Sheyla), am scribing for, and in the presence of, Lavell Haile M.D..    Electronically signed by: Sukhdeep Starr (Sheyla), 5/17/2023    Lavell JACKSON M.D. personally performed the services described in this documentation, as scribed by Sukhdeep Starr in my presence, and it is both accurate and complete.    The note accurately reflects work and decisions made by me.  Lavell Haile M.D.  5/18/2023  12:26 AM

## 2023-05-18 NOTE — OR SURGEON
Immediate Post- Operative Note        PostOp Diagnosis: PERIDIVERTICULAR EXTRALUMINAL AIR COLLECTION C/W CONTAINED PERF      Procedure(s): CT GUIDED CATHETER DRAINAGE WITH PLACEMENT OF 8 Arabic LOCKING LOOP CATHETER LEFT PARAMEDIAN PELVIS TO EXTRALUMINAL AIR POCKET. SUCTION BULB ATTACHED.     FINDINGS: CATHETER IN TARGETED AIR COLLECTION. AIR BURPED FROM NEEDLE. DID NOT GET ANY PUS, JUST A SMALL AMOUNT OF BLOODY FLUID.    2 ML BLOODY FLUID SENT FOR C+S GRAM.       Estimated Blood Loss: <1 CC        Complications: NONE        5/18/2023     9:58 AM     Darren Fonseca M.D.

## 2023-05-18 NOTE — PROGRESS NOTES
Hospital Medicine Daily Progress Note    Date of Service  5/18/2023    Chief Complaint  River Olson is a 42 y.o. male admitted 5/17/2023 with abdominal pain    Hospital Course  River Olson is a 42 y.o. male with past medical history of gout, thalassemia minor, hypertension, pneumonia with antibiotics treatment finished last week residual cough, who presented 5/17/2023 with left lower quadrant pain which is dull, worsening with movements and cough for 3 days, fever 103.2. CT showed severe diverticulitis with diverticular abscess 2.7 x 3.0 cm between bladder and diverticulum.      Per chart review, patient has a history of grade 2 splenic laceration when fell off an [employer] in 12/2017.  Chronic splenomegaly and abnormal liver enzyme were documented at that time.     Interval Problem Update  Patient was seen and examined at bedside.    Reported nausea, LLL abdominal pain,  no vomiting; bowel movement 2 days ago    Plan for IR drain today  Bilirubin 5.3 (was 4.8 10mo ago), I ordered RUQ ultrasound  AST/ALT 50/35  Sodium 130  WBC 11  Iron deficiency anemia Hb 9 -started on oral iron supplement    Continue Zosyn    I have discussed this patient's plan of care and discharge plan at IDT rounds today with Case Management, Nursing, Nursing leadership, and other members of the IDT team.    Consultants/Specialty  IR    Code Status  Full Code    Disposition  The patient is not medically cleared for discharge to home or a post-acute facility.      I have placed the appropriate orders for post-discharge needs.    Review of Systems  .    Physical Exam  Temp:  [36 °C (96.8 °F)-37.7 °C (99.8 °F)] 36 °C (96.8 °F)  Pulse:  [] 83  Resp:  [9-21] 16  BP: ()/(52-72) 103/59  SpO2:  [90 %-97 %] 97 %    Physical Exam  Constitutional:       Appearance: He is ill-appearing.   HENT:      Head: Normocephalic.      Nose: Nose normal.      Mouth/Throat:      Mouth: Mucous membranes are moist.   Eyes:      General:  Scleral icterus present.      Extraocular Movements: Extraocular movements intact.      Conjunctiva/sclera: Conjunctivae normal.   Cardiovascular:      Rate and Rhythm: Normal rate and regular rhythm.      Pulses: Normal pulses.      Heart sounds: Normal heart sounds.   Pulmonary:      Effort: Pulmonary effort is normal.      Breath sounds: Normal breath sounds. No wheezing or rales.   Abdominal:      General: Bowel sounds are normal.      Palpations: Abdomen is soft.      Tenderness: There is abdominal tenderness.   Musculoskeletal:         General: No swelling or tenderness.      Cervical back: Normal range of motion.   Skin:     General: Skin is warm.   Neurological:      Mental Status: He is alert and oriented to person, place, and time. Mental status is at baseline.   Psychiatric:         Mood and Affect: Mood normal.         Fluids    Intake/Output Summary (Last 24 hours) at 5/18/2023 1430  Last data filed at 5/18/2023 0537  Gross per 24 hour   Intake 760 ml   Output --   Net 760 ml       Laboratory  Recent Labs     05/17/23 1745 05/18/23  0117   WBC 11.8* 11.4*   RBC 4.34* 3.98*   HEMOGLOBIN 9.9* 9.0*   HEMATOCRIT 31.8* 28.8*   MCV 73.3* 72.4*   MCH 22.8* 22.6*   MCHC 31.1* 31.3*   RDW 45.9 45.1   PLATELETCT 299 254     Recent Labs     05/17/23 1745 05/18/23  0117   SODIUM 127* 130*   POTASSIUM 4.0 3.7   CHLORIDE 94* 97   CO2 23 21   GLUCOSE 145* 101*   BUN 15 13   CREATININE 0.71 0.57   CALCIUM 8.4* 8.0*     Recent Labs     05/18/23  0117   INR 1.47*               Imaging  CT-DRAIN-PERITONEAL   Final Result      1.  CT GUIDED PERITONEAL LEFT PARAMEDIAN PELVIC CATHETER DRAINAGE. CATHETER DRAINAGE TARGETED AN EXTRALUMINAL AIR POCKET IN PROXIMITY TO THE URINARY BLADDER AND JUST INFERIOR TO THE SEGMENT OF SIGMOID COLON SHOWING ACUTE DIVERTICULITIS.   2.  THE CURRENT PLAN IS TO MONITOR DRAINAGE OUTPUT AND OBTAIN A FOLLOWUP CT SCAN IN 5-7 DAYS IF CLINICALLY INDICATED.      DX-CHEST-LIMITED (1 VIEW)   Final  Result         1.  Left basilar atelectasis, no focal infiltrates.           Assessment/Plan  * Colonic diverticular abscess with SIRS- (present on admission)  Assessment & Plan  42-year-old male presented with left lower quadrant abdominal pain for 3 days and fever.  SIRS criteria included WBC 11.8, tachycardia 106, fever at home 103.2  CT of the abdomen and pelvis with contrast:  1.Wall thickening and extensive stranding in the distal sigmoid colon at the area of multiple diverticula, in keeping with severe acute diverticulitis.   2. There is a 2.7 x 3.0 cm gas collection between the urinary bladder and the diverticulitis, likely an abscess. Small amount of pelvic free fluid.  No peritoneal signs on exam.    Plan for IR drain today  Continue Zosyn  Continue clear liquid    Bilirubinemia  Assessment & Plan  Chronic.  CT of the abdomen showed status postcholecystectomy.      5/18 Bilirubin 5.3 (was 4.8 10mo ago), I ordered RUQ ultrasound  AST/ALT 50/35    Iron deficiency anemia  Assessment & Plan  Severe iron deficiency, ferritin is high due to infection  Likely due to thalassemia    Started oral iron supplement    Splenomegaly  Assessment & Plan  Likely secondary to thalassemia  Chronic splenomegaly and abnormal liver enzyme were documented at that time.       Per chart review, patient has a history of grade 2 splenic laceration when fell off an [employer] in 12/2017.    Monitor    Cough  Assessment & Plan  Patient reportedly was treated for pneumonia last week and still has residual COVID causing worsening of abdominal pain during the bout of cough  Chest x-ray showed some left lower lobe opacification, likely atelectasis per radiology report  Will check COVID-19/influenza/RSV  Cough suppressant as needed    Hyperglycemia  Assessment & Plan  Random blood sugar 145  A1c ordered.    Hyponatremia  Assessment & Plan  Sodium 127.  Possibly secondary to hypovolemia    IV fluid  Monitor      Gout of multiple sites- (present  on admission)  Assessment & Plan  Continue allopurinol    H/O thalassemia minor- (present on admission)  Assessment & Plan  Noted    Essential hypertension, benign- (present on admission)  Assessment & Plan  Will hold Micardis and felodipine  Monitor blood pressure         VTE prophylaxis: enoxaparin ppx    I have performed a physical exam and reviewed and updated ROS and Plan today (5/18/2023). In review of yesterday's note (5/17/2023), there are no changes except as documented above.

## 2023-05-18 NOTE — PROGRESS NOTES
4 Eyes Skin Assessment Completed by MILTON Coronado and MILTON Mcgowan.    Head WDL  Ears Birthmark behind right ear  Nose WDL  Mouth WDL  Neck WDL  Breast/Chest WDL  Shoulder Blades WDL  Spine WDL  (R) Arm/Elbow/Hand WDL  (L) Arm/Elbow/Hand WDL  Abdomen WDL  Groin WDL  Scrotum/Coccyx/Buttocks Patient refused  (R) Leg WDL  (L) Leg WDL  (R) Heel/Foot/Toe WDL  (L) Heel/Foot/Toe WDL          Devices In Places None      Interventions In Place Pillows and Pressure Redistribution Mattress    Possible Skin Injury No    Pictures Uploaded Into Epic N/A  Wound Consult Placed N/A  RN Wound Prevention Protocol Ordered No

## 2023-05-18 NOTE — PROGRESS NOTES
Pt presents to CT 4. PATIENT was consented by MD at bedside, confirmed by this RN and consent at bedside. Pt transferred to CT table in SUPINE position. Patient underwent a PERITONEAL DRAIN PLACEMENT by Dr. WISEMAN. Procedure site was marked by MD and verified using imaging guidance. Pt placed on monitor, prepped and draped in a sterile fashion. Vitals were taken every 5 minutes and remained stable during procedure (see doc flow sheet for results). CO2 waveform capnography was monitored and remained WNL throughout procedure. Report called to NISHA ROSE. Pt transported ON HOSPITAL BED with RN to S520.     Specimen: 1ML FLUID hand delivered to lab.  [employer] APDL LOCKING PIGTAIL 8F    REF: [id number]  LOT: [id number]  EXP: 03-

## 2023-05-18 NOTE — ASSESSMENT & PLAN NOTE
Likely secondary to thalassemia  Chronic splenomegaly and abnormal liver enzyme were documented at that time.     Per chart review, patient has a history of grade 2 splenic laceration when fell off an [employer] in 12/2017.    Monitor

## 2023-05-18 NOTE — ASSESSMENT & PLAN NOTE
42-year-old male presented with left lower quadrant abdominal pain for 3 days and fever.  SIRS criteria included WBC 11.8, tachycardia 106, fever at home 103.2  CT of the abdomen and pelvis with contrast:  1.Wall thickening and extensive stranding in the distal sigmoid colon at the area of multiple diverticula, in keeping with severe acute diverticulitis.   2. There is a 2.7 x 3.0 cm gas collection between the urinary bladder and the diverticulitis, likely an abscess. Small amount of pelvic free fluid.  No peritoneal signs on exam.    5/22 S/p  IR drain 5/18, culture with late growth of polymicrobial with Bacteroides vulgatus, Bacteroides ovatus, Bacteroides fragilis, Parabacteroides distasonis; Streptococcus mitis/oralis, Streptococcus gordonii  Follow-up sensitivity  Plan to repeat CT abdomen with contrast today  Drain output 20 cc/24h  Continue Zosyn, may transition to Augmentin pending CT result    I discussed with surgery, no surgical intervention indicated at this point.  Patient will eventually need colonoscopy and elective sigmoidectomy.

## 2023-05-18 NOTE — H&P
Hospital Medicine History & Physical Note    Date of Service  5/17/2023    Primary Care Physician  Pcp Pt States None        Code Status  Full Code    Chief Complaint  Chief Complaint   Patient presents with    Abdominal Pain     Since 1930 pm Monday. Denies n/v/d.     Sent from Urgent Care     Pt was sent here for diverticulitis per        History of Presenting Illness  River Olson is a 42 y.o. male with past medical history of gout, thalassemia minor, hypertension, pneumonia with antibiotics treatment finished last week residual cough, who presented 5/17/2023 with complaints of left lower quadrant pain which is dull, worsening with movements and cough since Monday evening, fever 103.2 earlier this morning.  Upon evaluation patient found to have severe diverticulitis with diverticular abscess 2.7 x 3.0 cm between bladder and diverticulum.  On exam patient does not have any peritoneal signs.  He denies nausea or vomiting.  He was tolerating oral intake, including early a.m. and had bowel movement earlier this morning.      I discussed the plan of care with patient.    Review of Systems  Review of Systems   Constitutional:  Positive for fever and malaise/fatigue. Negative for chills and weight loss.   HENT:  Negative for ear pain, hearing loss and tinnitus.    Eyes:  Negative for blurred vision, double vision and photophobia.   Respiratory:  Positive for cough and sputum production. Negative for hemoptysis.    Cardiovascular:  Negative for chest pain, palpitations and orthopnea.   Gastrointestinal:  Positive for abdominal pain. Negative for heartburn, nausea and vomiting.   Genitourinary:  Negative for dysuria, flank pain, frequency and hematuria.   Musculoskeletal:  Negative for back pain, joint pain and neck pain.   Skin:  Negative for itching and rash.   Neurological:  Negative for tremors, speech change, focal weakness and headaches.   Endo/Heme/Allergies:  Negative for environmental allergies and  polydipsia. Does not bruise/bleed easily.   Psychiatric/Behavioral:  Negative for hallucinations and substance abuse. The patient is not nervous/anxious.        Past Medical History   has a past medical history of ASTHMA, Hypertension (2014), Infectious disease (age 6), and Thalassanemia (1979).    Surgical History   has a past surgical history that includes other (greater than 10 years).     Family History  family history includes Alcohol abuse in his mother; Diabetes in his maternal grandfather; Heart Disease in his maternal grandfather.   Family history reviewed with patient. There is no family history that is pertinent to the chief complaint.     Social History   reports that he quit smoking about 5 years ago. His smoking use included cigarettes. He started smoking about 22 years ago. He has a 4.25 pack-year smoking history. He has quit using smokeless tobacco.  His smokeless tobacco use included chew. He reports current alcohol use. He reports that he does not use drugs.    Allergies  No Known Allergies    Medications  Prior to Admission Medications   Prescriptions Last Dose Informant Patient Reported? Taking?   albuterol 108 (90 Base) MCG/ACT Aero Soln inhalation aerosol   No No   Sig: Inhale 2 Puffs every four hours as needed for Shortness of Breath.   allopurinol (ZYLOPRIM) 100 MG Tab   No No   Sig: TAKE 1 TABLET BY MOUTH EVERY DAY   losartan-hydrochlorothiazide (HYZAAR) 100-12.5 MG per tablet   No No   Sig: Take 1 Tablet by mouth every day.      Facility-Administered Medications: None       Physical Exam  Temp:  [36.3 °C (97.3 °F)-37.2 °C (99 °F)] 36.3 °C (97.3 °F)  Pulse:  [100-106] 101  Resp:  [16-18] 18  BP: (100-129)/(57-72) 124/57  SpO2:  [93 %-97 %] 93 %  Blood Pressure: 124/57   Temperature: 36.3 °C (97.3 °F)   Pulse: (!) 101   Respiration: 18   Pulse Oximetry: 93 %       Physical Exam  Vitals and nursing note reviewed.   Constitutional:       General: He is not in acute distress.     Appearance:  Normal appearance.   HENT:      Head: Normocephalic and atraumatic.      Nose: Nose normal.      Mouth/Throat:      Mouth: Mucous membranes are moist.   Eyes:      Extraocular Movements: Extraocular movements intact.      Pupils: Pupils are equal, round, and reactive to light.   Cardiovascular:      Rate and Rhythm: Normal rate and regular rhythm.   Pulmonary:      Effort: Pulmonary effort is normal.      Breath sounds: Normal breath sounds.   Abdominal:      General: Abdomen is flat. Bowel sounds are normal. There is no distension.      Palpations: Abdomen is soft.      Tenderness: There is abdominal tenderness in the left lower quadrant. There is no guarding or rebound.   Musculoskeletal:         General: No swelling or deformity. Normal range of motion.      Cervical back: Normal range of motion and neck supple.   Skin:     General: Skin is warm and dry.   Neurological:      General: No focal deficit present.      Mental Status: He is alert and oriented to person, place, and time.   Psychiatric:         Mood and Affect: Mood normal.         Behavior: Behavior normal.         Laboratory:  Recent Labs     05/17/23  1745   WBC 11.8*   RBC 4.34*   HEMOGLOBIN 9.9*   HEMATOCRIT 31.8*   MCV 73.3*   MCH 22.8*   MCHC 31.1*   RDW 45.9   PLATELETCT 299     Recent Labs     05/17/23  1745   SODIUM 127*   POTASSIUM 4.0   CHLORIDE 94*   CO2 23   GLUCOSE 145*   BUN 15   CREATININE 0.71   CALCIUM 8.4*     Recent Labs     05/17/23  1745   ALTSGPT 26   ASTSGOT 23   ALKPHOSPHAT 60   TBILIRUBIN 3.6*   LIPASE 21   GLUCOSE 145*         No results for input(s): NTPROBNP in the last 72 hours.      No results for input(s): TROPONINT in the last 72 hours.    Imaging:  CT-DRAIN-PERITONEAL    (Results Pending)       X-Ray:  I have personally reviewed the images and compared with prior images.    Assessment/Plan:  Justification for Admission Status  I anticipate this patient will require at least two midnights for appropriate medical  management, necessitating inpatient admission because diverticular abscess    Patient will need a Med/Surg bed on MEDICAL service .  The need is secondary to diverticular abscess.    * Colonic diverticular abscess with SIRS- (present on admission)  Assessment & Plan  42-year-old male presented with left lower quadrant abdominal pain for 3 days and fever.  SIRS criteria included WBC 11.8, tachycardia 106, fever at home 103.2  CT of the abdomen and pelvis with contrast:  1.Wall thickening and extensive stranding in the distal sigmoid colon at the area of multiple diverticula, in keeping with severe acute diverticulitis.   2. There is a 2.7 x 3.0 cm gas collection between the urinary bladder and the diverticulitis, likely an abscess. Small amount of pelvic free fluid.  No peritoneal signs on exam.  Plan: Continue IV Zosyn   IR guided drain placement  Serial abdominal exams.  Low threshold to consult surgery  Pain control  Clear liquid diet, n.p.o. at midnight    Anemia  Assessment & Plan  Appears chronic.  Hemoglobin 9.9  Denies blood in stool or melena  Reticulocyte count is elevated  History of thalassemia minor  Splenomegaly on CT  Follow-up with PCP    Cough  Assessment & Plan  Patient reportedly was treated for pneumonia last week and still has residual COVID causing worsening of abdominal pain during the bout of cough  Chest x-ray showed some left lower lobe opacification, likely atelectasis per radiology report  Will check COVID-19/influenza/RSV  Cough suppressant as needed    Bilirubinemia  Assessment & Plan  Chronic.  CT of the abdomen showed status postcholecystectomy.  No CBD dilation  Follow-up with PCP    Hyperglycemia  Assessment & Plan  Random blood sugar 145  A1c ordered.    Hyponatremia  Assessment & Plan  Sodium 127.  Possibly secondary to hypovolemia  IV normal saline.  Repeat sodium      Gout of multiple sites- (present on admission)  Assessment & Plan  Continue allopurinol    H/O thalassemia minor-  (present on admission)  Assessment & Plan  Noted    Essential hypertension, benign- (present on admission)  Assessment & Plan  Will hold Micardis and felodipine  Monitor blood pressure        VTE prophylaxis: enoxaparin ppx    Time: 123 minutes

## 2023-05-19 ENCOUNTER — APPOINTMENT (OUTPATIENT)
Dept: RADIOLOGY | Facility: MEDICAL CENTER | Age: 43
DRG: 392 | End: 2023-05-19
Attending: STUDENT IN AN ORGANIZED HEALTH CARE EDUCATION/TRAINING PROGRAM
Payer: COMMERCIAL

## 2023-05-19 PROBLEM — R33.9 URINARY RETENTION: Status: ACTIVE | Noted: 2023-05-19

## 2023-05-19 LAB
ALBUMIN SERPL BCP-MCNC: 3 G/DL (ref 3.2–4.9)
ALBUMIN/GLOB SERPL: 0.9 G/DL
ALP SERPL-CCNC: 85 U/L (ref 30–99)
ALT SERPL-CCNC: 25 U/L (ref 2–50)
ANION GAP SERPL CALC-SCNC: 11 MMOL/L (ref 7–16)
AST SERPL-CCNC: 23 U/L (ref 12–45)
BILIRUB SERPL-MCNC: 2.5 MG/DL (ref 0.1–1.5)
BUN SERPL-MCNC: 10 MG/DL (ref 8–22)
CALCIUM ALBUM COR SERPL-MCNC: 9 MG/DL (ref 8.5–10.5)
CALCIUM SERPL-MCNC: 8.2 MG/DL (ref 8.5–10.5)
CHLORIDE SERPL-SCNC: 103 MMOL/L (ref 96–112)
CO2 SERPL-SCNC: 23 MMOL/L (ref 20–33)
CREAT SERPL-MCNC: 0.55 MG/DL (ref 0.5–1.4)
ERYTHROCYTE [DISTWIDTH] IN BLOOD BY AUTOMATED COUNT: 44.8 FL (ref 35.9–50)
EST. AVERAGE GLUCOSE BLD GHB EST-MCNC: 82 MG/DL
GFR SERPLBLD CREATININE-BSD FMLA CKD-EPI: 126 ML/MIN/1.73 M 2
GLOBULIN SER CALC-MCNC: 3.4 G/DL (ref 1.9–3.5)
GLUCOSE SERPL-MCNC: 100 MG/DL (ref 65–99)
HBA1C MFR BLD: 4.5 % (ref 4–5.6)
HCT VFR BLD AUTO: 26.1 % (ref 42–52)
HGB BLD-MCNC: 8 G/DL (ref 14–18)
MAGNESIUM SERPL-MCNC: 2.2 MG/DL (ref 1.5–2.5)
MCH RBC QN AUTO: 22.7 PG (ref 27–33)
MCHC RBC AUTO-ENTMCNC: 30.7 G/DL (ref 33.7–35.3)
MCV RBC AUTO: 74.1 FL (ref 81.4–97.8)
PHOSPHATE SERPL-MCNC: 3.9 MG/DL (ref 2.5–4.5)
PLATELET # BLD AUTO: 228 K/UL (ref 164–446)
POTASSIUM SERPL-SCNC: 4.1 MMOL/L (ref 3.6–5.5)
PROCALCITONIN SERPL-MCNC: 0.28 NG/ML
PROT SERPL-MCNC: 6.4 G/DL (ref 6–8.2)
RBC # BLD AUTO: 3.52 M/UL (ref 4.7–6.1)
SODIUM SERPL-SCNC: 137 MMOL/L (ref 135–145)
WBC # BLD AUTO: 5.3 K/UL (ref 4.8–10.8)

## 2023-05-19 PROCEDURE — 700102 HCHG RX REV CODE 250 W/ 637 OVERRIDE(OP): Performed by: STUDENT IN AN ORGANIZED HEALTH CARE EDUCATION/TRAINING PROGRAM

## 2023-05-19 PROCEDURE — 99233 SBSQ HOSP IP/OBS HIGH 50: CPT | Performed by: STUDENT IN AN ORGANIZED HEALTH CARE EDUCATION/TRAINING PROGRAM

## 2023-05-19 PROCEDURE — 700102 HCHG RX REV CODE 250 W/ 637 OVERRIDE(OP): Performed by: INTERNAL MEDICINE

## 2023-05-19 PROCEDURE — 76705 ECHO EXAM OF ABDOMEN: CPT

## 2023-05-19 PROCEDURE — 83735 ASSAY OF MAGNESIUM: CPT

## 2023-05-19 PROCEDURE — 84145 PROCALCITONIN (PCT): CPT

## 2023-05-19 PROCEDURE — A9270 NON-COVERED ITEM OR SERVICE: HCPCS | Performed by: INTERNAL MEDICINE

## 2023-05-19 PROCEDURE — 84100 ASSAY OF PHOSPHORUS: CPT

## 2023-05-19 PROCEDURE — 700111 HCHG RX REV CODE 636 W/ 250 OVERRIDE (IP): Performed by: INTERNAL MEDICINE

## 2023-05-19 PROCEDURE — A9270 NON-COVERED ITEM OR SERVICE: HCPCS | Performed by: STUDENT IN AN ORGANIZED HEALTH CARE EDUCATION/TRAINING PROGRAM

## 2023-05-19 PROCEDURE — 700105 HCHG RX REV CODE 258: Performed by: INTERNAL MEDICINE

## 2023-05-19 PROCEDURE — 770006 HCHG ROOM/CARE - MED/SURG/GYN SEMI*

## 2023-05-19 PROCEDURE — 85027 COMPLETE CBC AUTOMATED: CPT

## 2023-05-19 PROCEDURE — 36415 COLL VENOUS BLD VENIPUNCTURE: CPT

## 2023-05-19 PROCEDURE — 80053 COMPREHEN METABOLIC PANEL: CPT

## 2023-05-19 PROCEDURE — 83036 HEMOGLOBIN GLYCOSYLATED A1C: CPT

## 2023-05-19 RX ORDER — TAMSULOSIN HYDROCHLORIDE 0.4 MG/1
0.4 CAPSULE ORAL
Status: DISCONTINUED | OUTPATIENT
Start: 2023-05-19 | End: 2023-05-23 | Stop reason: HOSPADM

## 2023-05-19 RX ADMIN — ALLOPURINOL 100 MG: 100 TABLET ORAL at 05:17

## 2023-05-19 RX ADMIN — PIPERACILLIN AND TAZOBACTAM 4.5 G: 4; .5 INJECTION, POWDER, FOR SOLUTION INTRAVENOUS at 14:56

## 2023-05-19 RX ADMIN — TAMSULOSIN HYDROCHLORIDE 0.4 MG: 0.4 CAPSULE ORAL at 15:45

## 2023-05-19 RX ADMIN — OXYCODONE 5 MG: 5 TABLET ORAL at 05:17

## 2023-05-19 RX ADMIN — PIPERACILLIN AND TAZOBACTAM 4.5 G: 4; .5 INJECTION, POWDER, FOR SOLUTION INTRAVENOUS at 05:28

## 2023-05-19 RX ADMIN — PIPERACILLIN AND TAZOBACTAM 4.5 G: 4; .5 INJECTION, POWDER, FOR SOLUTION INTRAVENOUS at 22:49

## 2023-05-19 RX ADMIN — OXYCODONE 5 MG: 5 TABLET ORAL at 11:44

## 2023-05-19 RX ADMIN — POLYETHYLENE GLYCOL 3350 1 PACKET: 17 POWDER, FOR SOLUTION ORAL at 08:52

## 2023-05-19 RX ADMIN — SENNOSIDES AND DOCUSATE SODIUM 2 TABLET: 50; 8.6 TABLET ORAL at 17:32

## 2023-05-19 RX ADMIN — FAMOTIDINE 20 MG: 10 INJECTION INTRAVENOUS at 17:31

## 2023-05-19 RX ADMIN — OXYCODONE 5 MG: 5 TABLET ORAL at 17:32

## 2023-05-19 RX ADMIN — FAMOTIDINE 20 MG: 10 INJECTION INTRAVENOUS at 05:17

## 2023-05-19 RX ADMIN — OXYCODONE 5 MG: 5 TABLET ORAL at 00:38

## 2023-05-19 RX ADMIN — ENOXAPARIN SODIUM 40 MG: 100 INJECTION SUBCUTANEOUS at 17:31

## 2023-05-19 RX ADMIN — SENNOSIDES AND DOCUSATE SODIUM 2 TABLET: 50; 8.6 TABLET ORAL at 05:17

## 2023-05-19 RX ADMIN — OXYCODONE 5 MG: 5 TABLET ORAL at 22:37

## 2023-05-19 RX ADMIN — FERROUS SULFATE TAB 325 MG (65 MG ELEMENTAL FE) 325 MG: 325 (65 FE) TAB at 08:52

## 2023-05-19 RX ADMIN — SODIUM CHLORIDE: 9 INJECTION, SOLUTION INTRAVENOUS at 20:39

## 2023-05-19 ASSESSMENT — PAIN DESCRIPTION - PAIN TYPE
TYPE: ACUTE PAIN;SURGICAL PAIN
TYPE: ACUTE PAIN
TYPE: ACUTE PAIN;SURGICAL PAIN
TYPE: ACUTE PAIN
TYPE: ACUTE PAIN
TYPE: ACUTE PAIN;SURGICAL PAIN
TYPE: ACUTE PAIN
TYPE: ACUTE PAIN;SURGICAL PAIN
TYPE: ACUTE PAIN;SURGICAL PAIN

## 2023-05-19 NOTE — DOCUMENTATION QUERY
"                                                                         [facility]                                                                       Query Response Note      PATIENT:               AMANDA PERAZA  ACCT #:                  [id number]  MRN:                     [id number]  :                      1980  ADMIT DATE:       2023 7:53 PM  DISCH DATE:          RESPONDING  PROVIDER #:        [id number]           QUERY TEXT:    Per H&P and subsequent progress note, patient reportedly was treated for pneumonia last week and still has residual COVID.    Patient c/o cough and sputum production. COVID/Influenza/RSV all negative. Procal elevated at 0.28.   CXR with atelectasis and no focal infiltrates.    Please clarify the current status of the patient?s COVID-19 infection      The patient's clinical indicators include:  41 yo M admitted with diverticulitis with perforation    Clinical Indicators: Reported fever at home of 103.2  -H&P: Positive for cough and sputum production  -CXR: Left basilar atelectasis, no focal infiltrates.  -COVID/Influenza/RSV all negative.  -proCal 0.28    Risk Factors: Recent PNA, \"still has residual COVID\"    Treatments: Labs, CXR, cough suppressant, IV Zosyn for diverticulitis     Contact me with any questions.    Thank you for your time and attention,  Charu Palomo RN, CDI  [email or web address]  Connect via email, Voalte or messenger.  Options provided:   -- COVID-19 is not a current infection   -- COVID-19 continues to be a current and active infection   -- Other explanation, (please specify other explanation)      Query created by: Charu Palomo on 2023 8:48 AM    RESPONSE TEXT:    COVID-19 is not a current infection          Electronically signed by:  SJ HOUSER MD 2023 2:15 PM              "

## 2023-05-19 NOTE — PROGRESS NOTES
Hospital Medicine Daily Progress Note    Date of Service  5/19/2023    Chief Complaint  River Olson is a 42 y.o. male admitted 5/17/2023 with abdominal pain    Hospital Course  River Olson is a 42 y.o. male with past medical history of gout, thalassemia minor, hypertension, pneumonia with antibiotics treatment finished last week residual cough, who presented 5/17/2023 with left lower quadrant pain which is dull, worsening with movements and cough for 3 days, fever 103.2. CT showed severe diverticulitis with diverticular abscess 2.7 x 3.0 cm between bladder and diverticulum.      Per chart review, patient has a history of grade 2 splenic laceration when fell off an [employer] in 12/2017.  Chronic splenomegaly and abnormal liver enzyme were documented at that time.     Interval Problem Update  Patient was seen and examined at bedside.    Reported nausea, LLL abdominal pain,  no vomiting; bowel movement 3 days ago, continue bowel management  On 2 L NC    New urinary retention, bladder scan showed over 500cc, straight catheter, plan for Eckert if fails again  I ordered Flomax    S/p  IR drain 5/19, culture pending  Bilirubin 5.3 (was 4.8 10mo ago), > 2.5  RUQ ultrasound  AST/ALT 50/35 > 23/25  Sodium 130  WBC 11    Procalcitonin 0.28  Hemoglobin 8    Continue Zosyn    I have discussed this patient's plan of care and discharge plan at IDT rounds today with Case Management, Nursing, Nursing leadership, and other members of the IDT team.    Consultants/Specialty  IR    Code Status  Full Code    Disposition  The patient is not medically cleared for discharge to home or a post-acute facility.      I have placed the appropriate orders for post-discharge needs.    Review of Systems  .    Physical Exam  Temp:  [35.9 °C (96.7 °F)-36.7 °C (98 °F)] 36.7 °C (98 °F)  Pulse:  [77-88] 88  Resp:  [17-19] 18  BP: ()/(57-67) 111/67  SpO2:  [94 %-96 %] 96 %    Physical Exam  Constitutional:       Appearance: He is  ill-appearing.   HENT:      Head: Normocephalic.      Nose: Nose normal.      Mouth/Throat:      Mouth: Mucous membranes are moist.   Eyes:      General: Scleral icterus present.      Extraocular Movements: Extraocular movements intact.      Conjunctiva/sclera: Conjunctivae normal.   Cardiovascular:      Rate and Rhythm: Normal rate and regular rhythm.      Pulses: Normal pulses.      Heart sounds: Normal heart sounds.   Pulmonary:      Effort: Pulmonary effort is normal.      Breath sounds: Normal breath sounds. No wheezing or rales.   Abdominal:      General: Bowel sounds are normal.      Palpations: Abdomen is soft.      Tenderness: There is abdominal tenderness.   Musculoskeletal:         General: No swelling or tenderness.      Cervical back: Normal range of motion.   Skin:     General: Skin is warm.      Coloration: Skin is jaundiced.   Neurological:      Mental Status: He is alert and oriented to person, place, and time. Mental status is at baseline.   Psychiatric:         Mood and Affect: Mood normal.         Fluids    Intake/Output Summary (Last 24 hours) at 5/19/2023 1528  Last data filed at 5/19/2023 1400  Gross per 24 hour   Intake 220 ml   Output 10 ml   Net 210 ml       Laboratory  Recent Labs     05/17/23 1745 05/18/23  0117 05/19/23  0305   WBC 11.8* 11.4* 5.3   RBC 4.34* 3.98* 3.52*   HEMOGLOBIN 9.9* 9.0* 8.0*   HEMATOCRIT 31.8* 28.8* 26.1*   MCV 73.3* 72.4* 74.1*   MCH 22.8* 22.6* 22.7*   MCHC 31.1* 31.3* 30.7*   RDW 45.9 45.1 44.8   PLATELETCT 299 254 228     Recent Labs     05/17/23  1745 05/18/23  0117 05/19/23  0305   SODIUM 127* 130* 137   POTASSIUM 4.0 3.7 4.1   CHLORIDE 94* 97 103   CO2 23 21 23   GLUCOSE 145* 101* 100*   BUN 15 13 10   CREATININE 0.71 0.57 0.55   CALCIUM 8.4* 8.0* 8.2*     Recent Labs     05/18/23  0117   INR 1.47*               Imaging  CT-DRAIN-PERITONEAL   Final Result      1.  CT GUIDED PERITONEAL LEFT PARAMEDIAN PELVIC CATHETER DRAINAGE. CATHETER DRAINAGE TARGETED AN  EXTRALUMINAL AIR POCKET IN PROXIMITY TO THE URINARY BLADDER AND JUST INFERIOR TO THE SEGMENT OF SIGMOID COLON SHOWING ACUTE DIVERTICULITIS.   2.  THE CURRENT PLAN IS TO MONITOR DRAINAGE OUTPUT AND OBTAIN A FOLLOWUP CT SCAN IN 5-7 DAYS IF CLINICALLY INDICATED.      DX-CHEST-LIMITED (1 VIEW)   Final Result         1.  Left basilar atelectasis, no focal infiltrates.      US-RUQ    (Results Pending)        Assessment/Plan  * Colonic diverticular abscess with SIRS- (present on admission)  Assessment & Plan  42-year-old male presented with left lower quadrant abdominal pain for 3 days and fever.  SIRS criteria included WBC 11.8, tachycardia 106, fever at home 103.2  CT of the abdomen and pelvis with contrast:  1.Wall thickening and extensive stranding in the distal sigmoid colon at the area of multiple diverticula, in keeping with severe acute diverticulitis.   2. There is a 2.7 x 3.0 cm gas collection between the urinary bladder and the diverticulitis, likely an abscess. Small amount of pelvic free fluid.  No peritoneal signs on exam.    S/p  IR drain 5/19, culture pending  Continue Zosyn,   Continue clear liquid  I ordered a.m. CBC CMP to monitor    Bilirubinemia  Assessment & Plan  Chronic.  CT of the abdomen showed status postcholecystectomy.      5/18 Bilirubin 5.3 (was 4.8 10mo ago)  AST/ALT 50/35    5/19 Bilirubin > 2.5  RUQ ultrasound  AST/ALT 50/35 > 23/25    Urinary retention  Assessment & Plan  New urinary retention, bladder scan showed over 500cc,   straight catheter, plan for Eckert if fails again  I ordered Flomax    Iron deficiency anemia  Assessment & Plan  Severe iron deficiency, ferritin is high due to infection  Likely due to thalassemia  Ferritin high    Hold iron replacement given thalassemia and the concern of iron overload    Splenomegaly  Assessment & Plan  Likely secondary to thalassemia  Chronic splenomegaly and abnormal liver enzyme were documented at that time.       Per chart review, patient  has a history of grade 2 splenic laceration when fell off an [employer] in 12/2017.    Monitor    Cough  Assessment & Plan  Patient reportedly was treated for pneumonia last week and still has residual COVID causing worsening of abdominal pain during the bout of cough  Chest x-ray showed some left lower lobe opacification, likely atelectasis per radiology report  Will check COVID-19/influenza/RSV  Cough suppressant as needed    Hyperglycemia  Assessment & Plan  Random blood sugar 145  A1c 4.5    Continue to monitor    Hyponatremia  Assessment & Plan  Sodium 127.  Possibly secondary to hypovolemia    IV fluid  Monitor      Gout of multiple sites- (present on admission)  Assessment & Plan  Continue allopurinol    H/O thalassemia minor- (present on admission)  Assessment & Plan  Noted    Essential hypertension, benign- (present on admission)  Assessment & Plan  Will hold Micardis and felodipine  Monitor blood pressure         VTE prophylaxis: enoxaparin ppx    I have performed a physical exam and reviewed and updated ROS and Plan today (5/19/2023). In review of yesterday's note (5/18/2023), there are no changes except as documented above.        ROS

## 2023-05-19 NOTE — CARE PLAN
The patient is Stable - Low risk of patient condition declining or worsening    Shift Goals  Clinical Goals: pain control, monitor diet  Patient Goals: comfort    Progress made toward(s) clinical / shift goals:    Problem: Knowledge Deficit - Standard  Goal: Patient and family/care givers will demonstrate understanding of plan of care, disease process/condition, diagnostic tests and medications  Outcome: Progressing     Problem: Pain - Standard  Goal: Alleviation of pain or a reduction in pain to the patient’s comfort goal  Outcome: Progressing       Patient is not progressing towards the following goals:

## 2023-05-19 NOTE — PROGRESS NOTES
Received report from day shift RN, patient care assumed, on assessment AAOX4; states pain is tolerable at this time, O2 saturation 93% on 1L  O2 via NC. Bed locked and in low position, call bell within reach, will continue to monitor.

## 2023-05-19 NOTE — ASSESSMENT & PLAN NOTE
New urinary retention, bladder scan showed over 500cc,   straight catheter, plan for Eckert if fails again  I ordered Flomax    5/20 resolved with Flomax, no need for Eckert

## 2023-05-19 NOTE — CARE PLAN
The patient is Stable - Low risk of patient condition declining or worsening    Shift Goals  Clinical Goals: pain control  Patient Goals: comfort    Progress made toward(s) clinical / shift goals:  patient verbalizes understanding of plan of care. Analgesics prescribed as MAR.     Problem: Knowledge Deficit - Standard  Goal: Patient and family/care givers will demonstrate understanding of plan of care, disease process/condition, diagnostic tests and medications  Outcome: Progressing     Problem: Pain - Standard  Goal: Alleviation of pain or a reduction in pain to the patient’s comfort goal  Outcome: Progressing       Patient is not progressing towards the following goals:

## 2023-05-20 LAB
ALBUMIN SERPL BCP-MCNC: 3.1 G/DL (ref 3.2–4.9)
ALBUMIN/GLOB SERPL: 0.9 G/DL
ALP SERPL-CCNC: 83 U/L (ref 30–99)
ALT SERPL-CCNC: 21 U/L (ref 2–50)
ANION GAP SERPL CALC-SCNC: 9 MMOL/L (ref 7–16)
AST SERPL-CCNC: 22 U/L (ref 12–45)
BACTERIA UR CULT: NORMAL
BILIRUB SERPL-MCNC: 2 MG/DL (ref 0.1–1.5)
BUN SERPL-MCNC: 8 MG/DL (ref 8–22)
CALCIUM ALBUM COR SERPL-MCNC: 8.8 MG/DL (ref 8.5–10.5)
CALCIUM SERPL-MCNC: 8.1 MG/DL (ref 8.5–10.5)
CHLORIDE SERPL-SCNC: 101 MMOL/L (ref 96–112)
CO2 SERPL-SCNC: 24 MMOL/L (ref 20–33)
CREAT SERPL-MCNC: 0.47 MG/DL (ref 0.5–1.4)
ERYTHROCYTE [DISTWIDTH] IN BLOOD BY AUTOMATED COUNT: 43.2 FL (ref 35.9–50)
GFR SERPLBLD CREATININE-BSD FMLA CKD-EPI: 132 ML/MIN/1.73 M 2
GLOBULIN SER CALC-MCNC: 3.5 G/DL (ref 1.9–3.5)
GLUCOSE SERPL-MCNC: 99 MG/DL (ref 65–99)
HCT VFR BLD AUTO: 25.7 % (ref 42–52)
HGB BLD-MCNC: 7.9 G/DL (ref 14–18)
MCH RBC QN AUTO: 22.2 PG (ref 27–33)
MCHC RBC AUTO-ENTMCNC: 30.7 G/DL (ref 33.7–35.3)
MCV RBC AUTO: 72.2 FL (ref 81.4–97.8)
PLATELET # BLD AUTO: 269 K/UL (ref 164–446)
POTASSIUM SERPL-SCNC: 3.7 MMOL/L (ref 3.6–5.5)
PROT SERPL-MCNC: 6.6 G/DL (ref 6–8.2)
RBC # BLD AUTO: 3.56 M/UL (ref 4.7–6.1)
SIGNIFICANT IND 70042: NORMAL
SITE SITE: NORMAL
SODIUM SERPL-SCNC: 134 MMOL/L (ref 135–145)
SOURCE SOURCE: NORMAL
WBC # BLD AUTO: 5.2 K/UL (ref 4.8–10.8)

## 2023-05-20 PROCEDURE — A9270 NON-COVERED ITEM OR SERVICE: HCPCS | Performed by: INTERNAL MEDICINE

## 2023-05-20 PROCEDURE — 85027 COMPLETE CBC AUTOMATED: CPT

## 2023-05-20 PROCEDURE — 99222 1ST HOSP IP/OBS MODERATE 55: CPT | Performed by: SURGERY

## 2023-05-20 PROCEDURE — 700105 HCHG RX REV CODE 258: Performed by: STUDENT IN AN ORGANIZED HEALTH CARE EDUCATION/TRAINING PROGRAM

## 2023-05-20 PROCEDURE — 700102 HCHG RX REV CODE 250 W/ 637 OVERRIDE(OP): Performed by: INTERNAL MEDICINE

## 2023-05-20 PROCEDURE — 36415 COLL VENOUS BLD VENIPUNCTURE: CPT

## 2023-05-20 PROCEDURE — 80053 COMPREHEN METABOLIC PANEL: CPT

## 2023-05-20 PROCEDURE — 99232 SBSQ HOSP IP/OBS MODERATE 35: CPT | Performed by: STUDENT IN AN ORGANIZED HEALTH CARE EDUCATION/TRAINING PROGRAM

## 2023-05-20 PROCEDURE — 700111 HCHG RX REV CODE 636 W/ 250 OVERRIDE (IP): Performed by: STUDENT IN AN ORGANIZED HEALTH CARE EDUCATION/TRAINING PROGRAM

## 2023-05-20 PROCEDURE — A9270 NON-COVERED ITEM OR SERVICE: HCPCS | Performed by: STUDENT IN AN ORGANIZED HEALTH CARE EDUCATION/TRAINING PROGRAM

## 2023-05-20 PROCEDURE — 770006 HCHG ROOM/CARE - MED/SURG/GYN SEMI*

## 2023-05-20 PROCEDURE — 700111 HCHG RX REV CODE 636 W/ 250 OVERRIDE (IP): Performed by: INTERNAL MEDICINE

## 2023-05-20 PROCEDURE — 700102 HCHG RX REV CODE 250 W/ 637 OVERRIDE(OP): Performed by: STUDENT IN AN ORGANIZED HEALTH CARE EDUCATION/TRAINING PROGRAM

## 2023-05-20 PROCEDURE — 700105 HCHG RX REV CODE 258: Performed by: INTERNAL MEDICINE

## 2023-05-20 RX ADMIN — ENOXAPARIN SODIUM 40 MG: 100 INJECTION SUBCUTANEOUS at 18:47

## 2023-05-20 RX ADMIN — FAMOTIDINE 20 MG: 10 INJECTION INTRAVENOUS at 18:47

## 2023-05-20 RX ADMIN — PIPERACILLIN AND TAZOBACTAM 3.38 G: 3; .375 INJECTION, POWDER, FOR SOLUTION INTRAVENOUS at 15:05

## 2023-05-20 RX ADMIN — HYDROCODONE BITARTRATE AND HOMATROPINE METHYLBROMIDE 5 ML: 5; 1.5 SOLUTION ORAL at 20:44

## 2023-05-20 RX ADMIN — OXYCODONE 5 MG: 5 TABLET ORAL at 17:15

## 2023-05-20 RX ADMIN — POLYETHYLENE GLYCOL 3350 1 PACKET: 17 POWDER, FOR SOLUTION ORAL at 05:27

## 2023-05-20 RX ADMIN — MORPHINE SULFATE 2 MG: 4 INJECTION INTRAVENOUS at 20:44

## 2023-05-20 RX ADMIN — SODIUM CHLORIDE: 9 INJECTION, SOLUTION INTRAVENOUS at 21:03

## 2023-05-20 RX ADMIN — PIPERACILLIN AND TAZOBACTAM 3.38 G: 3; .375 INJECTION, POWDER, FOR SOLUTION INTRAVENOUS at 21:05

## 2023-05-20 RX ADMIN — ALLOPURINOL 100 MG: 100 TABLET ORAL at 05:29

## 2023-05-20 RX ADMIN — FAMOTIDINE 20 MG: 10 INJECTION INTRAVENOUS at 05:29

## 2023-05-20 RX ADMIN — OXYCODONE 5 MG: 5 TABLET ORAL at 05:30

## 2023-05-20 RX ADMIN — TAMSULOSIN HYDROCHLORIDE 0.4 MG: 0.4 CAPSULE ORAL at 09:51

## 2023-05-20 RX ADMIN — PIPERACILLIN AND TAZOBACTAM 4.5 G: 4; .5 INJECTION, POWDER, FOR SOLUTION INTRAVENOUS at 05:36

## 2023-05-20 RX ADMIN — OXYCODONE 5 MG: 5 TABLET ORAL at 14:06

## 2023-05-20 RX ADMIN — OXYCODONE 5 MG: 5 TABLET ORAL at 09:52

## 2023-05-20 RX ADMIN — SENNOSIDES AND DOCUSATE SODIUM 2 TABLET: 50; 8.6 TABLET ORAL at 05:27

## 2023-05-20 RX ADMIN — MORPHINE SULFATE 2 MG: 4 INJECTION INTRAVENOUS at 00:37

## 2023-05-20 ASSESSMENT — PAIN DESCRIPTION - PAIN TYPE
TYPE: ACUTE PAIN;SURGICAL PAIN
TYPE: ACUTE PAIN
TYPE: ACUTE PAIN;SURGICAL PAIN
TYPE: SURGICAL PAIN
TYPE: ACUTE PAIN
TYPE: SURGICAL PAIN

## 2023-05-20 NOTE — PROGRESS NOTES
Hospital Medicine Daily Progress Note    Date of Service  5/20/2023    Chief Complaint  River Olson is a 42 y.o. male admitted 5/17/2023 with abdominal pain    Hospital Course  River Olson is a 42 y.o. male with past medical history of gout, thalassemia minor, hypertension, pneumonia with antibiotics treatment finished last week residual cough, who presented 5/17/2023 with left lower quadrant pain which is dull, worsening with movements and cough for 3 days, fever 103.2. CT showed severe diverticulitis with diverticular abscess 2.7 x 3.0 cm between bladder and diverticulum.      Per chart review, patient has a history of grade 2 splenic laceration when fell off an [employer] in 12/2017.  Chronic splenomegaly and abnormal liver enzyme were documented at that time.     Interval Problem Update  Patient was seen and examined at bedside.    On 2 L NC    Advanced to full liquid    S/p  IR drain 5/19, culture NTD  I discussed with general surgery Dr. Broussard, will see the patient      Bilirubin 5.3 (was 4.8 10mo ago), > 2  RUQ ultrasound showed no acute pathology, consistent with hepatic steatosis  AST/ALT 50/35 > 22/21  Sodium 130  WBC 11    Procalcitonin 0.28  Hemoglobin 8    Continue Zosyn    I have discussed this patient's plan of care and discharge plan at IDT rounds today with Case Management, Nursing, Nursing leadership, and other members of the IDT team.    Consultants/Specialty  IR    Code Status  Full Code    Disposition  The patient is not medically cleared for discharge to home or a post-acute facility.      I have placed the appropriate orders for post-discharge needs.    Review of Systems  .JH    Physical Exam  Temp:  [36.4 °C (97.6 °F)-36.6 °C (97.9 °F)] 36.5 °C (97.7 °F)  Pulse:  [81-88] 81  Resp:  [18-19] 18  BP: (102-124)/(63-72) 124/72  SpO2:  [90 %-97 %] 97 %    Physical Exam  Constitutional:       Appearance: He is ill-appearing.   HENT:      Head: Normocephalic.      Nose: Nose normal.       Mouth/Throat:      Mouth: Mucous membranes are moist.   Eyes:      General: Scleral icterus present.      Extraocular Movements: Extraocular movements intact.      Conjunctiva/sclera: Conjunctivae normal.   Cardiovascular:      Rate and Rhythm: Normal rate and regular rhythm.      Pulses: Normal pulses.      Heart sounds: Normal heart sounds.   Pulmonary:      Effort: Pulmonary effort is normal.      Breath sounds: Normal breath sounds. No wheezing or rales.   Abdominal:      General: Bowel sounds are normal.      Palpations: Abdomen is soft.      Tenderness: There is abdominal tenderness.   Musculoskeletal:         General: No swelling or tenderness.      Cervical back: Normal range of motion.   Skin:     General: Skin is warm.      Coloration: Skin is jaundiced.   Neurological:      Mental Status: He is alert and oriented to person, place, and time. Mental status is at baseline.   Psychiatric:         Mood and Affect: Mood normal.         Fluids    Intake/Output Summary (Last 24 hours) at 5/20/2023 1627  Last data filed at 5/20/2023 1506  Gross per 24 hour   Intake 960 ml   Output 700 ml   Net 260 ml       Laboratory  Recent Labs     05/18/23  0117 05/19/23  0305 05/20/23  0100   WBC 11.4* 5.3 5.2   RBC 3.98* 3.52* 3.56*   HEMOGLOBIN 9.0* 8.0* 7.9*   HEMATOCRIT 28.8* 26.1* 25.7*   MCV 72.4* 74.1* 72.2*   MCH 22.6* 22.7* 22.2*   MCHC 31.3* 30.7* 30.7*   RDW 45.1 44.8 43.2   PLATELETCT 254 228 269     Recent Labs     05/18/23  0117 05/19/23  0305 05/20/23  0100   SODIUM 130* 137 134*   POTASSIUM 3.7 4.1 3.7   CHLORIDE 97 103 101   CO2 21 23 24   GLUCOSE 101* 100* 99   BUN 13 10 8   CREATININE 0.57 0.55 0.47*   CALCIUM 8.0* 8.2* 8.1*     Recent Labs     05/18/23  0117   INR 1.47*               Imaging  US-RUQ   Final Result      1. Echogenic liver, most commonly hepatic steatosis.         CT-DRAIN-PERITONEAL   Final Result      1.  CT GUIDED PERITONEAL LEFT PARAMEDIAN PELVIC CATHETER DRAINAGE. CATHETER DRAINAGE  TARGETED AN EXTRALUMINAL AIR POCKET IN PROXIMITY TO THE URINARY BLADDER AND JUST INFERIOR TO THE SEGMENT OF SIGMOID COLON SHOWING ACUTE DIVERTICULITIS.   2.  THE CURRENT PLAN IS TO MONITOR DRAINAGE OUTPUT AND OBTAIN A FOLLOWUP CT SCAN IN 5-7 DAYS IF CLINICALLY INDICATED.      DX-CHEST-LIMITED (1 VIEW)   Final Result         1.  Left basilar atelectasis, no focal infiltrates.           Assessment/Plan  * Colonic diverticular abscess with SIRS- (present on admission)  Assessment & Plan  42-year-old male presented with left lower quadrant abdominal pain for 3 days and fever.  SIRS criteria included WBC 11.8, tachycardia 106, fever at home 103.2  CT of the abdomen and pelvis with contrast:  1.Wall thickening and extensive stranding in the distal sigmoid colon at the area of multiple diverticula, in keeping with severe acute diverticulitis.   2. There is a 2.7 x 3.0 cm gas collection between the urinary bladder and the diverticulitis, likely an abscess. Small amount of pelvic free fluid.  No peritoneal signs on exam.    S/p  IR drain 5/19, culture NTD  I discussed with general surgery Dr. Broussard, will see the patient    Continue Zosyn,   Advanced to full liquid  I ordered a.m. CBC CMP to monitor    Bilirubinemia  Assessment & Plan  Chronic.  CT of the abdomen showed status postcholecystectomy.      5/18 Bilirubin 5.3 (was 4.8 10mo ago)  AST/ALT 50/35    Improving  RUQ ultrasound showed no acute pathology, consistent with hepatic steatosis    Urinary retention  Assessment & Plan  New urinary retention, bladder scan showed over 500cc,   straight catheter, plan for Eckert if fails again  I ordered Flomax    5/20 resolved with Flomax, no need for Eckert    Iron deficiency anemia  Assessment & Plan  Severe iron deficiency, ferritin is high due to infection  Likely due to thalassemia  Ferritin high    Hold iron replacement given thalassemia and the concern of iron overload    Splenomegaly  Assessment & Plan  Likely secondary to  thalassemia  Chronic splenomegaly and abnormal liver enzyme were documented at that time.       Per chart review, patient has a history of grade 2 splenic laceration when fell off an [employer] in 12/2017.    Monitor    Cough  Assessment & Plan  Patient reportedly was treated for pneumonia last week and still has residual COVID causing worsening of abdominal pain during the bout of cough  Chest x-ray showed some left lower lobe opacification, likely atelectasis per radiology report  Will check COVID-19/influenza/RSV  Cough suppressant as needed    Hyperglycemia  Assessment & Plan  Random blood sugar 145  A1c 4.5    Continue to monitor    Hyponatremia  Assessment & Plan  Sodium 127.  Possibly secondary to hypovolemia    IV fluid  Monitor      Gout of multiple sites- (present on admission)  Assessment & Plan  Continue allopurinol    H/O thalassemia minor- (present on admission)  Assessment & Plan  Noted    Essential hypertension, benign- (present on admission)  Assessment & Plan  Will hold Micardis and felodipine  Monitor blood pressure         VTE prophylaxis: enoxaparin ppx    I have performed a physical exam and reviewed and updated ROS and Plan today (5/20/2023). In review of yesterday's note (5/19/2023), there are no changes except as documented above.        ROS

## 2023-05-20 NOTE — CARE PLAN
The patient is Stable - Low risk of patient condition declining or worsening    Shift Goals  Clinical Goals: pain control, Abd. ultrasoud  Patient Goals: comfort    Progress made toward(s) clinical / shift goals:  patient verbalizes understanding of plan of care; analgesic provided as per MAR.     Problem: Knowledge Deficit - Standard  Goal: Patient and family/care givers will demonstrate understanding of plan of care, disease process/condition, diagnostic tests and medications  Outcome: Progressing     Problem: Pain - Standard  Goal: Alleviation of pain or a reduction in pain to the patient’s comfort goal  Outcome: Progressing       Patient is not progressing towards the following goals:

## 2023-05-20 NOTE — CONSULTS
DATE OF CONSULTATION:  5/20/2023     REFERRING PHYSICIAN:   Ira Martinez M.D.     CONSULTING PHYSICIAN:  Sukhwinder Broussard M.D.     REASON FOR CONSULTATION:  I have been asked by  to see the patient in surgical consultation for evaluation of complicated diverticulitis.    HISTORY OF PRESENT ILLNESS: The patient is a 42 year-old White man who was admitted on 5/17/23 with a complicated diverticulitis. He was sent to [facility] from an urgent care. He endorses two - days history of severe  lower  abdominal pain. The pain is associated with fever and malaise. admits a history of pneumonia recently. The patient denies any history of previous abdominal surgery. The patient denies any previous surgery for obstructive symptoms. He underwent percutaneous drainage of 2.7 x 3.0 cm pelvic abscess on 5/18/23. He has never had a colonoscopy.    PAST MEDICAL HISTORY:  has a past medical history of ASTHMA, Hypertension (2014), Infectious disease (age 6), and Thalassanemia (1979).    He has no past medical history of Diabetes or Type II or unspecified type diabetes mellitus without mention of complication, not stated as uncontrolled.    PAST SURGICAL HISTORY:  has a past surgical history that includes other (greater than 10 years).    ALLERGIES: No Known Allergies    CURRENT MEDICATIONS:    Home Medications       Reviewed by Ashley Schwartz (Pharmacy Tech) on 05/17/23 at 2144  Med List Status: Complete     Medication Last Dose Status   acetaminophen (TYLENOL) 500 MG Tab > 1 WEEK Active   albuterol 108 (90 Base) MCG/ACT Aero Soln inhalation aerosol 5/17/2023 Active   allopurinol (ZYLOPRIM) 100 MG Tab 5/17/2023 Active   azithromycin (ZITHROMAX) 250 MG Tab 5/9/2023 Active   cetirizine (ZYRTEC) 10 MG Tab FEW DAYS AGO Active   felodipine ER (PLENDIL) 10 MG TABLET SR 24 HR 5/17/2023 Active   FLUoxetine (PROZAC) 20 MG Cap 5/17/2023 Active   ibuprofen (MOTRIN) 200 MG Tab 5/16/2023 Active   Pseudoephedrine-Guaifenesin (MUCINEX D MAX  STRENGTH) 120-1200 MG TABLET SR 12 HR FEW DAYS AGO Active   QUEtiapine (SEROQUEL) 50 MG tablet 5/16/2023 Active   telmisartan (MICARDIS) 40 MG Tab 5/17/2023 Active   triamcinolone acetonide (KENALOG-40) 40 MG/ML Suspension 5/3/2023 Active                    FAMILY HISTORY: family history includes Alcohol abuse in his mother; Diabetes in his maternal grandfather; Heart Disease in his maternal grandfather.    SOCIAL HISTORY:  reports that he quit smoking about 5 years ago. His smoking use included cigarettes. He started smoking about 22 years ago. He has a 4.25 pack-year smoking history. He has quit using smokeless tobacco.  His smokeless tobacco use included chew. He reports current alcohol use. He reports that he does not use drugs.    REVIEW OF SYSTEMS: Comprehensive review of systems is negative with the exception of the aforementioned HPI, PMH, and PSH bullets in accordance with CMS guidelines.    PHYSICAL EXAMINATION:    Physical Exam  Vitals and nursing note reviewed.   Constitutional:       General: He is not in acute distress.     Appearance: He is not toxic-appearing.   HENT:      Head: Normocephalic and atraumatic.      Right Ear: External ear normal.      Left Ear: External ear normal.      Nose: Nose normal.      Mouth/Throat:      Mouth: Mucous membranes are moist.      Pharynx: Oropharynx is clear.   Eyes:      General: Scleral icterus present.      Pupils: Pupils are equal, round, and reactive to light.   Cardiovascular:      Rate and Rhythm: Regular rhythm.   Pulmonary:      Effort: Pulmonary effort is normal. No respiratory distress.   Abdominal:      Palpations: Abdomen is soft.      Tenderness: There is no guarding or rebound.      Comments: Lower abdominal tenderness to palpation.  Drain in place with scant output.   Genitourinary:     Comments: Deferred.  Musculoskeletal:         General: No tenderness or deformity.      Cervical back: Neck supple.   Skin:     General: Skin is warm and dry.       Capillary Refill: Capillary refill takes less than 2 seconds.   Neurological:      General: No focal deficit present.      Mental Status: He is alert and oriented to person, place, and time.   Psychiatric:         Mood and Affect: Mood normal.         Behavior: Behavior normal.         LABORATORY VALUES:   Recent Labs     05/18/23 0117 05/19/23  0305 05/20/23  0100   WBC 11.4* 5.3 5.2   RBC 3.98* 3.52* 3.56*   HEMOGLOBIN 9.0* 8.0* 7.9*   HEMATOCRIT 28.8* 26.1* 25.7*   MCV 72.4* 74.1* 72.2*   MCH 22.6* 22.7* 22.2*   MCHC 31.3* 30.7* 30.7*   RDW 45.1 44.8 43.2   PLATELETCT 254 228 269     Recent Labs     05/18/23 0117 05/19/23 0305 05/20/23  0100   SODIUM 130* 137 134*   POTASSIUM 3.7 4.1 3.7   CHLORIDE 97 103 101   CO2 21 23 24   GLUCOSE 101* 100* 99   BUN 13 10 8   CREATININE 0.57 0.55 0.47*   CALCIUM 8.0* 8.2* 8.1*     Recent Labs     05/18/23 0117 05/19/23  0305 05/20/23  0100   ASTSGOT 50* 23 22   ALTSGPT 35 25 21   TBILIRUBIN 5.3* 2.5* 2.0*   ALKPHOSPHAT 104* 85 83   GLOBULIN 3.3 3.4 3.5   INR 1.47*  --   --      Recent Labs     05/18/23 0117   INR 1.47*        IMAGING:   US-RUQ   Final Result      1. Echogenic liver, most commonly hepatic steatosis.         CT-DRAIN-PERITONEAL   Final Result      1.  CT GUIDED PERITONEAL LEFT PARAMEDIAN PELVIC CATHETER DRAINAGE. CATHETER DRAINAGE TARGETED AN EXTRALUMINAL AIR POCKET IN PROXIMITY TO THE URINARY BLADDER AND JUST INFERIOR TO THE SEGMENT OF SIGMOID COLON SHOWING ACUTE DIVERTICULITIS.   2.  THE CURRENT PLAN IS TO MONITOR DRAINAGE OUTPUT AND OBTAIN A FOLLOWUP CT SCAN IN 5-7 DAYS IF CLINICALLY INDICATED.      DX-CHEST-LIMITED (1 VIEW)   Final Result         1.  Left basilar atelectasis, no focal infiltrates.          ASSESSMENT AND PLAN:     * Colonic diverticular abscess with SIRS- (present on admission)  Assessment & Plan  Admitted on 5/17 for complicated diverticulitis.  Status-post percutaneous drainage 5/18.  Abdominal exam without peritonitis.  I discussed  the surgical management of complicated diverticular disease with the patient. We reviewed that under ideal circumstances his current episode could be managed non-operatively with antibiotics and percutaneous drainage with plans for interval colonoscopy and elective sigmoidectomy. Should surgery be necessary urgently he will likely require an ostomy of some form.  No indications for acute surgical intervention at this time.  Will follow.        DISPOSITION: Medical evaluation and admission. The patient was admitted to the Medical Service prior to surgical consultation. [facility] Service will follow.     ____________________________________     Sukhwinder Broussard M.D.    DD: 5/20/2023  9:32 AM    ACS NSQIP Surgical Risk Calculator

## 2023-05-20 NOTE — PROGRESS NOTES
Received report from day shift RN. Assumed patient care, on assessment patient AAOX4, 1 L 02 via NC; using bedside urinal for 300 cc of yellow urine, same emptied; patient states feeling distention to left lower abdominal site, ambulation in room as per tolerance advised; patient NPO waiting for an scheduled ultrasound, no further concerns, bed locked and in low position, call bell within reach, will continue to monitor.

## 2023-05-21 LAB
ALBUMIN SERPL BCP-MCNC: 2.9 G/DL (ref 3.2–4.9)
ALBUMIN/GLOB SERPL: 0.9 G/DL
ALP SERPL-CCNC: 78 U/L (ref 30–99)
ALT SERPL-CCNC: 20 U/L (ref 2–50)
ANION GAP SERPL CALC-SCNC: 10 MMOL/L (ref 7–16)
AST SERPL-CCNC: 20 U/L (ref 12–45)
BACTERIA FLD AEROBE CULT: ABNORMAL
BILIRUB SERPL-MCNC: 1.3 MG/DL (ref 0.1–1.5)
BUN SERPL-MCNC: 5 MG/DL (ref 8–22)
CALCIUM ALBUM COR SERPL-MCNC: 8.9 MG/DL (ref 8.5–10.5)
CALCIUM SERPL-MCNC: 8 MG/DL (ref 8.5–10.5)
CHLORIDE SERPL-SCNC: 101 MMOL/L (ref 96–112)
CO2 SERPL-SCNC: 25 MMOL/L (ref 20–33)
CREAT SERPL-MCNC: 0.55 MG/DL (ref 0.5–1.4)
ERYTHROCYTE [DISTWIDTH] IN BLOOD BY AUTOMATED COUNT: 43.6 FL (ref 35.9–50)
GFR SERPLBLD CREATININE-BSD FMLA CKD-EPI: 126 ML/MIN/1.73 M 2
GLOBULIN SER CALC-MCNC: 3.3 G/DL (ref 1.9–3.5)
GLUCOSE SERPL-MCNC: 93 MG/DL (ref 65–99)
GRAM STN SPEC: ABNORMAL
HCT VFR BLD AUTO: 24.9 % (ref 42–52)
HGB BLD-MCNC: 7.8 G/DL (ref 14–18)
MCH RBC QN AUTO: 22.9 PG (ref 27–33)
MCHC RBC AUTO-ENTMCNC: 31.3 G/DL (ref 33.7–35.3)
MCV RBC AUTO: 73 FL (ref 81.4–97.8)
PLATELET # BLD AUTO: 269 K/UL (ref 164–446)
POTASSIUM SERPL-SCNC: 3.4 MMOL/L (ref 3.6–5.5)
PROT SERPL-MCNC: 6.2 G/DL (ref 6–8.2)
RBC # BLD AUTO: 3.41 M/UL (ref 4.7–6.1)
SIGNIFICANT IND 70042: ABNORMAL
SITE SITE: ABNORMAL
SODIUM SERPL-SCNC: 136 MMOL/L (ref 135–145)
SOURCE SOURCE: ABNORMAL
WBC # BLD AUTO: 4.5 K/UL (ref 4.8–10.8)

## 2023-05-21 PROCEDURE — A9270 NON-COVERED ITEM OR SERVICE: HCPCS | Performed by: INTERNAL MEDICINE

## 2023-05-21 PROCEDURE — 700111 HCHG RX REV CODE 636 W/ 250 OVERRIDE (IP): Performed by: INTERNAL MEDICINE

## 2023-05-21 PROCEDURE — 770006 HCHG ROOM/CARE - MED/SURG/GYN SEMI*

## 2023-05-21 PROCEDURE — 80053 COMPREHEN METABOLIC PANEL: CPT

## 2023-05-21 PROCEDURE — 99232 SBSQ HOSP IP/OBS MODERATE 35: CPT | Performed by: NURSE PRACTITIONER

## 2023-05-21 PROCEDURE — 36415 COLL VENOUS BLD VENIPUNCTURE: CPT

## 2023-05-21 PROCEDURE — 700111 HCHG RX REV CODE 636 W/ 250 OVERRIDE (IP): Performed by: STUDENT IN AN ORGANIZED HEALTH CARE EDUCATION/TRAINING PROGRAM

## 2023-05-21 PROCEDURE — 99233 SBSQ HOSP IP/OBS HIGH 50: CPT | Performed by: STUDENT IN AN ORGANIZED HEALTH CARE EDUCATION/TRAINING PROGRAM

## 2023-05-21 PROCEDURE — 700102 HCHG RX REV CODE 250 W/ 637 OVERRIDE(OP): Performed by: INTERNAL MEDICINE

## 2023-05-21 PROCEDURE — 700105 HCHG RX REV CODE 258: Performed by: STUDENT IN AN ORGANIZED HEALTH CARE EDUCATION/TRAINING PROGRAM

## 2023-05-21 PROCEDURE — 85027 COMPLETE CBC AUTOMATED: CPT

## 2023-05-21 PROCEDURE — 700102 HCHG RX REV CODE 250 W/ 637 OVERRIDE(OP): Performed by: STUDENT IN AN ORGANIZED HEALTH CARE EDUCATION/TRAINING PROGRAM

## 2023-05-21 PROCEDURE — A9270 NON-COVERED ITEM OR SERVICE: HCPCS | Performed by: STUDENT IN AN ORGANIZED HEALTH CARE EDUCATION/TRAINING PROGRAM

## 2023-05-21 RX ORDER — POTASSIUM CHLORIDE 20 MEQ/1
40 TABLET, EXTENDED RELEASE ORAL ONCE
Status: COMPLETED | OUTPATIENT
Start: 2023-05-21 | End: 2023-05-21

## 2023-05-21 RX ORDER — FLUOXETINE HYDROCHLORIDE 20 MG/1
20 CAPSULE ORAL DAILY
Status: DISCONTINUED | OUTPATIENT
Start: 2023-05-21 | End: 2023-05-23 | Stop reason: HOSPADM

## 2023-05-21 RX ORDER — QUETIAPINE FUMARATE 25 MG/1
100 TABLET, FILM COATED ORAL
Status: DISCONTINUED | OUTPATIENT
Start: 2023-05-21 | End: 2023-05-23 | Stop reason: HOSPADM

## 2023-05-21 RX ADMIN — QUETIAPINE FUMARATE 100 MG: 25 TABLET ORAL at 22:30

## 2023-05-21 RX ADMIN — OXYCODONE 5 MG: 5 TABLET ORAL at 22:30

## 2023-05-21 RX ADMIN — FAMOTIDINE 20 MG: 10 INJECTION INTRAVENOUS at 18:22

## 2023-05-21 RX ADMIN — OXYCODONE 5 MG: 5 TABLET ORAL at 08:02

## 2023-05-21 RX ADMIN — ENOXAPARIN SODIUM 40 MG: 100 INJECTION SUBCUTANEOUS at 18:22

## 2023-05-21 RX ADMIN — OXYCODONE 5 MG: 5 TABLET ORAL at 15:44

## 2023-05-21 RX ADMIN — TAMSULOSIN HYDROCHLORIDE 0.4 MG: 0.4 CAPSULE ORAL at 08:01

## 2023-05-21 RX ADMIN — PIPERACILLIN AND TAZOBACTAM 3.38 G: 3; .375 INJECTION, POWDER, FOR SOLUTION INTRAVENOUS at 06:07

## 2023-05-21 RX ADMIN — FAMOTIDINE 20 MG: 10 INJECTION INTRAVENOUS at 06:04

## 2023-05-21 RX ADMIN — SENNOSIDES AND DOCUSATE SODIUM 2 TABLET: 50; 8.6 TABLET ORAL at 18:22

## 2023-05-21 RX ADMIN — PIPERACILLIN AND TAZOBACTAM 3.38 G: 3; .375 INJECTION, POWDER, FOR SOLUTION INTRAVENOUS at 13:47

## 2023-05-21 RX ADMIN — POTASSIUM CHLORIDE 40 MEQ: 1500 TABLET, EXTENDED RELEASE ORAL at 08:01

## 2023-05-21 RX ADMIN — FLUOXETINE 20 MG: 20 CAPSULE ORAL at 15:43

## 2023-05-21 RX ADMIN — ALLOPURINOL 100 MG: 100 TABLET ORAL at 06:04

## 2023-05-21 RX ADMIN — PIPERACILLIN AND TAZOBACTAM 3.38 G: 3; .375 INJECTION, POWDER, FOR SOLUTION INTRAVENOUS at 22:31

## 2023-05-21 ASSESSMENT — PAIN DESCRIPTION - PAIN TYPE
TYPE: ACUTE PAIN;SURGICAL PAIN
TYPE: ACUTE PAIN
TYPE: ACUTE PAIN;SURGICAL PAIN
TYPE: ACUTE PAIN
TYPE: ACUTE PAIN;SURGICAL PAIN

## 2023-05-21 ASSESSMENT — ENCOUNTER SYMPTOMS
ABDOMINAL PAIN: 1
NAUSEA: 0
CONSTIPATION: 0
VOMITING: 0
CHILLS: 0
FEVER: 0

## 2023-05-21 ASSESSMENT — PATIENT HEALTH QUESTIONNAIRE - PHQ9
SUM OF ALL RESPONSES TO PHQ9 QUESTIONS 1 AND 2: 0
2. FEELING DOWN, DEPRESSED, IRRITABLE, OR HOPELESS: NOT AT ALL
1. LITTLE INTEREST OR PLEASURE IN DOING THINGS: NOT AT ALL

## 2023-05-21 NOTE — PROGRESS NOTES
"  DATE: 5/21/2023    Hospital day 4 colonic diverticular abscess with SIRS.    S/p IR drain placement 5/18.    INTERVAL EVENTS:  Up to chair, minimal pain, tolerating diet.    REVIEW OF SYSTEMS:  Review of Systems   Constitutional:  Negative for chills, fever and malaise/fatigue.   Gastrointestinal:  Positive for abdominal pain (mild). Negative for constipation, nausea and vomiting.   Skin:  Negative for rash.     PHYSICAL EXAMINATION:    Vital Signs: /74   Pulse 88   Temp 36.3 °C (97.4 °F) (Temporal)   Resp 18   Ht 1.803 m (5' 11\")   Wt 95.1 kg (209 lb 10.5 oz)   SpO2 92%   Physical Exam  Vitals and nursing note reviewed.   Constitutional:       General: He is awake. He is not in acute distress.     Appearance: He is well-developed. He is not toxic-appearing.   Pulmonary:      Effort: Pulmonary effort is normal. No respiratory distress.   Abdominal:      General: There is no distension.      Palpations: Abdomen is soft.      Tenderness: There is abdominal tenderness (mild at drain site). There is no guarding or rebound.      Comments: Drain in place with scant output.   Genitourinary:     Comments: Deferred.  Musculoskeletal:      Cervical back: Neck supple.   Skin:     General: Skin is warm and dry.   Neurological:      Mental Status: He is alert. Mental status is at baseline.   Psychiatric:         Mood and Affect: Mood normal.         Behavior: Behavior normal. Behavior is cooperative.     LABORATORY VALUES:   Recent Labs     05/19/23  0305 05/20/23  0100 05/21/23  0115   WBC 5.3 5.2 4.5*   RBC 3.52* 3.56* 3.41*   HEMOGLOBIN 8.0* 7.9* 7.8*   HEMATOCRIT 26.1* 25.7* 24.9*   MCV 74.1* 72.2* 73.0*   MCH 22.7* 22.2* 22.9*   MCHC 30.7* 30.7* 31.3*   RDW 44.8 43.2 43.6   PLATELETCT 228 269 269     Recent Labs     05/19/23  0305 05/20/23  0100 05/21/23  0115   SODIUM 137 134* 136   POTASSIUM 4.1 3.7 3.4*   CHLORIDE 103 101 101   CO2 23 24 25   GLUCOSE 100* 99 93   BUN 10 8 5*   CREATININE 0.55 0.47* 0.55 "   CALCIUM 8.2* 8.1* 8.0*     Recent Labs     05/19/23  0305 05/20/23  0100 05/21/23  0115   ASTSGOT 23 22 20   ALTSGPT 25 21 20   TBILIRUBIN 2.5* 2.0* 1.3   ALKPHOSPHAT 85 83 78   GLOBULIN 3.4 3.5 3.3     IMAGING:   US-RUQ   Final Result      1. Echogenic liver, most commonly hepatic steatosis.         CT-DRAIN-PERITONEAL   Final Result      1.  CT GUIDED PERITONEAL LEFT PARAMEDIAN PELVIC CATHETER DRAINAGE. CATHETER DRAINAGE TARGETED AN EXTRALUMINAL AIR POCKET IN PROXIMITY TO THE URINARY BLADDER AND JUST INFERIOR TO THE SEGMENT OF SIGMOID COLON SHOWING ACUTE DIVERTICULITIS.   2.  THE CURRENT PLAN IS TO MONITOR DRAINAGE OUTPUT AND OBTAIN A FOLLOWUP CT SCAN IN 5-7 DAYS IF CLINICALLY INDICATED.      DX-CHEST-LIMITED (1 VIEW)   Final Result         1.  Left basilar atelectasis, no focal infiltrates.        ASSESSMENT AND PLAN:   * Colonic diverticular abscess with SIRS- (present on admission)  Assessment & Plan  Admitted on 5/17 for complicated diverticulitis.  Status-post percutaneous drainage 5/18.  Abdominal exam without peritonitis.  I discussed the surgical management of complicated diverticular disease with the patient. We reviewed that under ideal circumstances his current episode could be managed non-operatively with antibiotics and percutaneous drainage with plans for interval colonoscopy and elective sigmoidectomy. Should surgery be necessary urgently he will likely require an ostomy of some form.  5/21 Abdominal exam remains benign and pt tolerating regular diet.  No indications for acute surgical intervention at this time. Recommend continued antibiotics and drain monitoring with eventual outpatient colonoscopy and elective sigmoidectomy.  [facility] Blue Service.    Responding well to conservative management.  Antibiotics per primary team, monitor cultures.  Could repeat CT abd/pelvis 5-7 days after drain placement if clinically indicated.  Plan for drain removal once output less than 30 ml in 24 hrs or  after repeat imaging (if obtained and abscess resolved).  Surgery will follow peripherally         ____________________________________     KEATON Elliott    DD: 5/21/2023  11:19 AM

## 2023-05-21 NOTE — PROGRESS NOTES
Assumed care of patient 0700. Received Report from [facility] nurse. Patient A&O 4, on RA, Reporting a pain level of 3. Call light within reach, belongings within reach, Fall precautions in place, bed in lowest position. Patient does not have any other needs at this time. Hourly rounding.    POC was discussed with patient. All questions were answered. Patient verbalized understanding.

## 2023-05-21 NOTE — CARE PLAN
Problem: Knowledge Deficit - Standard  Goal: Patient and family/care givers will demonstrate understanding of plan of care, disease process/condition, diagnostic tests and medications  Outcome: Progressing     Problem: Pain - Standard  Goal: Alleviation of pain or a reduction in pain to the patient’s comfort goal  Outcome: Progressing   The patient is Stable - Low risk of patient condition declining or worsening    Shift Goals  Clinical Goals: Pain/cough control  Patient Goals: Sleep comfortably    Progress made toward(s) clinical / shift goals:  Patient received PRN pain med and cough med. Patient is now resting in bed comfortably. Bed is locked and in lowest position, call bell within reach of patient, all needs met at this time.    Patient is not progressing towards the following goals:

## 2023-05-21 NOTE — ASSESSMENT & PLAN NOTE
Admitted on 5/17 for complicated diverticulitis.  Status-post percutaneous drainage 5/18.  Abdominal exam without peritonitis.  I discussed the surgical management of complicated diverticular disease with the patient. We reviewed that under ideal circumstances his current episode could be managed non-operatively with antibiotics and percutaneous drainage with plans for interval colonoscopy and elective sigmoidectomy. Should surgery be necessary urgently he will likely require an ostomy of some form.  5/21 Abdominal exam remains benign and pt tolerating regular diet.  No indications for acute surgical intervention at this time. Recommend continued antibiotics and drain monitoring with eventual outpatient colonoscopy and elective sigmoidectomy.  [facility] Blue Service.

## 2023-05-21 NOTE — CARE PLAN
Problem: Knowledge Deficit - Standard  Goal: Patient and family/care givers will demonstrate understanding of plan of care, disease process/condition, diagnostic tests and medications  Outcome: Progressing     Problem: Pain - Standard  Goal: Alleviation of pain or a reduction in pain to the patient’s comfort goal  Outcome: Progressing       The patient is Stable - Low risk of patient condition declining or worsening    Shift Goals  Clinical Goals: Monitor VS, labs, pain control, safety and advance diet as tolerated  Patient Goals: Comfort, safety and eat more  Family Goals: N/A    Progress made toward(s) clinical / shift goals:  Plan of care discussed and patient verbalizes understanding. Pain medications given as needed with effectiveness after given.    Patient is not progressing towards the following goals:

## 2023-05-21 NOTE — PROGRESS NOTES
Hospital Medicine Daily Progress Note    Date of Service  5/21/2023    Chief Complaint  River Olson is a 42 y.o. male admitted 5/17/2023 with abdominal pain    Hospital Course  River Olson is a 42 y.o. male with past medical history of gout, thalassemia minor, hypertension, pneumonia with antibiotics treatment finished last week residual cough, who presented 5/17/2023 with left lower quadrant pain which is dull, worsening with movements and cough for 3 days, fever 103.2. CT showed severe diverticulitis with diverticular abscess 2.7 x 3.0 cm between bladder and diverticulum.      Per chart review, patient has a history of grade 2 splenic laceration when fell off an [employer] in 12/2017.  Chronic splenomegaly and abnormal liver enzyme were documented at that time.     RUQ ultrasound showed no acute pathology, consistent with hepatic steatosis  AST/ALT 50/35 > 22/21  Bilirubin 5.3 (was 4.8 10mo ago) > 1.3  Procalcitonin 0.28    Interval Problem Update  Patient was seen and examined at bedside.      Advanced to GI soft    S/p  IR drain 5/18, culture with late growth of polymicrobial with Bacteroides vulgatus, Bacteroides ovatus, Bacteroides fragilis, Parabacteroides distasonis; Streptococcus mitis/oralis, Streptococcus gordonii  Follow-up sensitivity  Continue Zosyn    I discussed with surgery, no surgical intervention indicated at this point.  Patient will eventually need colonoscopy and elective sigmoidectomy.     Plan to repeat CT tomorrow    Potassium 3.4  Sodium 134  Hemoglobin 8, stable      I have discussed this patient's plan of care and discharge plan at IDT rounds today with Case Management, Nursing, Nursing leadership, and other members of the IDT team.    Consultants/Specialty  IR    Code Status  Full Code    Disposition  The patient is not medically cleared for discharge to home or a post-acute facility.      I have placed the appropriate orders for post-discharge needs.    Review of  Systems  .    Physical Exam  Temp:  [36 °C (96.8 °F)-36.9 °C (98.4 °F)] 36.3 °C (97.4 °F)  Pulse:  [81-98] 88  Resp:  [17-18] 18  BP: (121-125)/(64-74) 121/74  SpO2:  [92 %-97 %] 92 %    Physical Exam  Constitutional:       Appearance: He is ill-appearing.   HENT:      Head: Normocephalic.      Nose: Nose normal.      Mouth/Throat:      Mouth: Mucous membranes are moist.   Eyes:      General: Scleral icterus present.      Extraocular Movements: Extraocular movements intact.      Conjunctiva/sclera: Conjunctivae normal.   Cardiovascular:      Rate and Rhythm: Normal rate and regular rhythm.      Pulses: Normal pulses.      Heart sounds: Normal heart sounds.   Pulmonary:      Effort: Pulmonary effort is normal.      Breath sounds: Normal breath sounds. No wheezing or rales.   Abdominal:      General: Bowel sounds are normal.      Palpations: Abdomen is soft.      Tenderness: There is abdominal tenderness.   Musculoskeletal:         General: No swelling or tenderness.      Cervical back: Normal range of motion.   Skin:     General: Skin is warm.      Coloration: Skin is jaundiced.   Neurological:      Mental Status: He is alert and oriented to person, place, and time. Mental status is at baseline.   Psychiatric:         Mood and Affect: Mood normal.         Fluids    Intake/Output Summary (Last 24 hours) at 5/21/2023 1453  Last data filed at 5/20/2023 1700  Gross per 24 hour   Intake 960 ml   Output 400 ml   Net 560 ml       Laboratory  Recent Labs     05/19/23  0305 05/20/23  0100 05/21/23  0115   WBC 5.3 5.2 4.5*   RBC 3.52* 3.56* 3.41*   HEMOGLOBIN 8.0* 7.9* 7.8*   HEMATOCRIT 26.1* 25.7* 24.9*   MCV 74.1* 72.2* 73.0*   MCH 22.7* 22.2* 22.9*   MCHC 30.7* 30.7* 31.3*   RDW 44.8 43.2 43.6   PLATELETCT 228 269 269     Recent Labs     05/19/23  0305 05/20/23  0100 05/21/23  0115   SODIUM 137 134* 136   POTASSIUM 4.1 3.7 3.4*   CHLORIDE 103 101 101   CO2 23 24 25   GLUCOSE 100* 99 93   BUN 10 8 5*   CREATININE 0.55  0.47* 0.55   CALCIUM 8.2* 8.1* 8.0*                     Imaging  US-RUQ   Final Result      1. Echogenic liver, most commonly hepatic steatosis.         CT-DRAIN-PERITONEAL   Final Result      1.  CT GUIDED PERITONEAL LEFT PARAMEDIAN PELVIC CATHETER DRAINAGE. CATHETER DRAINAGE TARGETED AN EXTRALUMINAL AIR POCKET IN PROXIMITY TO THE URINARY BLADDER AND JUST INFERIOR TO THE SEGMENT OF SIGMOID COLON SHOWING ACUTE DIVERTICULITIS.   2.  THE CURRENT PLAN IS TO MONITOR DRAINAGE OUTPUT AND OBTAIN A FOLLOWUP CT SCAN IN 5-7 DAYS IF CLINICALLY INDICATED.      DX-CHEST-LIMITED (1 VIEW)   Final Result         1.  Left basilar atelectasis, no focal infiltrates.           Assessment/Plan  * Colonic diverticular abscess with SIRS- (present on admission)  Assessment & Plan  42-year-old male presented with left lower quadrant abdominal pain for 3 days and fever.  SIRS criteria included WBC 11.8, tachycardia 106, fever at home 103.2  CT of the abdomen and pelvis with contrast:  1.Wall thickening and extensive stranding in the distal sigmoid colon at the area of multiple diverticula, in keeping with severe acute diverticulitis.   2. There is a 2.7 x 3.0 cm gas collection between the urinary bladder and the diverticulitis, likely an abscess. Small amount of pelvic free fluid.  No peritoneal signs on exam.    5/21 S/p  IR drain 5/18, culture with late growth of polymicrobial with Bacteroides vulgatus, Bacteroides ovatus, Bacteroides fragilis, Parabacteroides distasonis; Streptococcus mitis/oralis, Streptococcus gordonii  Follow-up sensitivity  Continue Zosyn    I discussed with surgery, no surgical intervention indicated at this point.  Patient will eventually need colonoscopy and elective sigmoidectomy.     Plan to repeat CT tomorrow    I ordered a.m. CBC CMP to monitor    Bilirubinemia  Assessment & Plan  Chronic.  CT of the abdomen showed status postcholecystectomy.      5/18 Bilirubin 5.3 (was 4.8 10mo ago)  AST/ALT  50/35    Resolved  RUQ ultrasound showed no acute pathology, consistent with hepatic steatosis    Urinary retention  Assessment & Plan  New urinary retention, bladder scan showed over 500cc,   straight catheter, plan for Eckert if fails again  I ordered Flomax    5/20 resolved with Flomax, no need for Eckert    Iron deficiency anemia  Assessment & Plan  Severe iron deficiency, ferritin is high due to infection  Likely due to thalassemia  Ferritin high    Hold iron replacement given thalassemia and the concern of iron overload    Splenomegaly  Assessment & Plan  Likely secondary to thalassemia  Chronic splenomegaly and abnormal liver enzyme were documented at that time.       Per chart review, patient has a history of grade 2 splenic laceration when fell off an [employer] in 12/2017.    Monitor    Cough  Assessment & Plan  Patient reportedly was treated for pneumonia last week and still has residual COVID causing worsening of abdominal pain during the bout of cough  Chest x-ray showed some left lower lobe opacification, likely atelectasis per radiology report  Will check COVID-19/influenza/RSV  Cough suppressant as needed    Hyperglycemia  Assessment & Plan  Random blood sugar 145  A1c 4.5    Continue to monitor    Hyponatremia  Assessment & Plan  Sodium 127.  Possibly secondary to hypovolemia    IV fluid  Monitor      Gout of multiple sites- (present on admission)  Assessment & Plan  Continue allopurinol    H/O thalassemia minor- (present on admission)  Assessment & Plan  Noted    Essential hypertension, benign- (present on admission)  Assessment & Plan  Will hold Micardis and felodipine  Monitor blood pressure         VTE prophylaxis: enoxaparin ppx    I have performed a physical exam and reviewed and updated ROS and Plan today (5/21/2023). In review of yesterday's note (5/20/2023), there are no changes except as documented above.        ROS

## 2023-05-22 ENCOUNTER — APPOINTMENT (OUTPATIENT)
Dept: RADIOLOGY | Facility: MEDICAL CENTER | Age: 43
DRG: 392 | End: 2023-05-22
Attending: STUDENT IN AN ORGANIZED HEALTH CARE EDUCATION/TRAINING PROGRAM
Payer: COMMERCIAL

## 2023-05-22 LAB
ALBUMIN SERPL BCP-MCNC: 3.2 G/DL (ref 3.2–4.9)
ALBUMIN/GLOB SERPL: 1 G/DL
ALP SERPL-CCNC: 77 U/L (ref 30–99)
ALT SERPL-CCNC: 23 U/L (ref 2–50)
ANION GAP SERPL CALC-SCNC: 10 MMOL/L (ref 7–16)
AST SERPL-CCNC: 22 U/L (ref 12–45)
BILIRUB SERPL-MCNC: 1.3 MG/DL (ref 0.1–1.5)
BUN SERPL-MCNC: 4 MG/DL (ref 8–22)
CALCIUM ALBUM COR SERPL-MCNC: 9 MG/DL (ref 8.5–10.5)
CALCIUM SERPL-MCNC: 8.4 MG/DL (ref 8.5–10.5)
CHLORIDE SERPL-SCNC: 103 MMOL/L (ref 96–112)
CO2 SERPL-SCNC: 24 MMOL/L (ref 20–33)
CREAT SERPL-MCNC: 0.58 MG/DL (ref 0.5–1.4)
ERYTHROCYTE [DISTWIDTH] IN BLOOD BY AUTOMATED COUNT: 42.3 FL (ref 35.9–50)
GFR SERPLBLD CREATININE-BSD FMLA CKD-EPI: 124 ML/MIN/1.73 M 2
GLOBULIN SER CALC-MCNC: 3.3 G/DL (ref 1.9–3.5)
GLUCOSE SERPL-MCNC: 127 MG/DL (ref 65–99)
HCT VFR BLD AUTO: 27.1 % (ref 42–52)
HGB BLD-MCNC: 8.2 G/DL (ref 14–18)
MCH RBC QN AUTO: 22 PG (ref 27–33)
MCHC RBC AUTO-ENTMCNC: 30.3 G/DL (ref 33.7–35.3)
MCV RBC AUTO: 72.7 FL (ref 81.4–97.8)
PLATELET # BLD AUTO: 274 K/UL (ref 164–446)
PMV BLD AUTO: 12.2 FL (ref 9–12.9)
POTASSIUM SERPL-SCNC: 3.6 MMOL/L (ref 3.6–5.5)
PROT SERPL-MCNC: 6.5 G/DL (ref 6–8.2)
RBC # BLD AUTO: 3.73 M/UL (ref 4.7–6.1)
SODIUM SERPL-SCNC: 137 MMOL/L (ref 135–145)
WBC # BLD AUTO: 4.1 K/UL (ref 4.8–10.8)

## 2023-05-22 PROCEDURE — 80053 COMPREHEN METABOLIC PANEL: CPT

## 2023-05-22 PROCEDURE — A9270 NON-COVERED ITEM OR SERVICE: HCPCS | Performed by: STUDENT IN AN ORGANIZED HEALTH CARE EDUCATION/TRAINING PROGRAM

## 2023-05-22 PROCEDURE — 770006 HCHG ROOM/CARE - MED/SURG/GYN SEMI*

## 2023-05-22 PROCEDURE — 700111 HCHG RX REV CODE 636 W/ 250 OVERRIDE (IP): Performed by: INTERNAL MEDICINE

## 2023-05-22 PROCEDURE — A9270 NON-COVERED ITEM OR SERVICE: HCPCS | Performed by: INTERNAL MEDICINE

## 2023-05-22 PROCEDURE — 74177 CT ABD & PELVIS W/CONTRAST: CPT

## 2023-05-22 PROCEDURE — 700102 HCHG RX REV CODE 250 W/ 637 OVERRIDE(OP): Performed by: INTERNAL MEDICINE

## 2023-05-22 PROCEDURE — 700111 HCHG RX REV CODE 636 W/ 250 OVERRIDE (IP): Performed by: STUDENT IN AN ORGANIZED HEALTH CARE EDUCATION/TRAINING PROGRAM

## 2023-05-22 PROCEDURE — 700102 HCHG RX REV CODE 250 W/ 637 OVERRIDE(OP): Performed by: STUDENT IN AN ORGANIZED HEALTH CARE EDUCATION/TRAINING PROGRAM

## 2023-05-22 PROCEDURE — 99232 SBSQ HOSP IP/OBS MODERATE 35: CPT | Performed by: STUDENT IN AN ORGANIZED HEALTH CARE EDUCATION/TRAINING PROGRAM

## 2023-05-22 PROCEDURE — 700117 HCHG RX CONTRAST REV CODE 255: Performed by: STUDENT IN AN ORGANIZED HEALTH CARE EDUCATION/TRAINING PROGRAM

## 2023-05-22 PROCEDURE — 85027 COMPLETE CBC AUTOMATED: CPT

## 2023-05-22 PROCEDURE — 700105 HCHG RX REV CODE 258: Performed by: STUDENT IN AN ORGANIZED HEALTH CARE EDUCATION/TRAINING PROGRAM

## 2023-05-22 PROCEDURE — 99232 SBSQ HOSP IP/OBS MODERATE 35: CPT | Performed by: SURGERY

## 2023-05-22 RX ORDER — POTASSIUM CHLORIDE 20 MEQ/1
40 TABLET, EXTENDED RELEASE ORAL ONCE
Status: COMPLETED | OUTPATIENT
Start: 2023-05-22 | End: 2023-05-22

## 2023-05-22 RX ADMIN — PIPERACILLIN AND TAZOBACTAM 3.38 G: 3; .375 INJECTION, POWDER, FOR SOLUTION INTRAVENOUS at 14:50

## 2023-05-22 RX ADMIN — FLUOXETINE 20 MG: 20 CAPSULE ORAL at 04:43

## 2023-05-22 RX ADMIN — SENNOSIDES AND DOCUSATE SODIUM 2 TABLET: 50; 8.6 TABLET ORAL at 17:58

## 2023-05-22 RX ADMIN — OXYCODONE 2.5 MG: 5 TABLET ORAL at 22:43

## 2023-05-22 RX ADMIN — IOHEXOL 100 ML: 350 INJECTION, SOLUTION INTRAVENOUS at 22:26

## 2023-05-22 RX ADMIN — POTASSIUM CHLORIDE 40 MEQ: 1500 TABLET, EXTENDED RELEASE ORAL at 08:11

## 2023-05-22 RX ADMIN — QUETIAPINE FUMARATE 100 MG: 25 TABLET ORAL at 22:43

## 2023-05-22 RX ADMIN — OXYCODONE 5 MG: 5 TABLET ORAL at 17:59

## 2023-05-22 RX ADMIN — TAMSULOSIN HYDROCHLORIDE 0.4 MG: 0.4 CAPSULE ORAL at 08:11

## 2023-05-22 RX ADMIN — PIPERACILLIN AND TAZOBACTAM 3.38 G: 3; .375 INJECTION, POWDER, FOR SOLUTION INTRAVENOUS at 04:45

## 2023-05-22 RX ADMIN — ALLOPURINOL 100 MG: 100 TABLET ORAL at 04:43

## 2023-05-22 RX ADMIN — PIPERACILLIN AND TAZOBACTAM 3.38 G: 3; .375 INJECTION, POWDER, FOR SOLUTION INTRAVENOUS at 21:33

## 2023-05-22 RX ADMIN — OXYCODONE 5 MG: 5 TABLET ORAL at 08:11

## 2023-05-22 RX ADMIN — ENOXAPARIN SODIUM 40 MG: 100 INJECTION SUBCUTANEOUS at 17:58

## 2023-05-22 RX ADMIN — FAMOTIDINE 20 MG: 10 INJECTION INTRAVENOUS at 17:58

## 2023-05-22 RX ADMIN — FAMOTIDINE 20 MG: 10 INJECTION INTRAVENOUS at 04:47

## 2023-05-22 ASSESSMENT — PAIN DESCRIPTION - PAIN TYPE
TYPE: SURGICAL PAIN
TYPE: ACUTE PAIN
TYPE: ACUTE PAIN;SURGICAL PAIN
TYPE: SURGICAL PAIN
TYPE: SURGICAL PAIN;ACUTE PAIN
TYPE: SURGICAL PAIN

## 2023-05-22 ASSESSMENT — PATIENT HEALTH QUESTIONNAIRE - PHQ9
1. LITTLE INTEREST OR PLEASURE IN DOING THINGS: NOT AT ALL
2. FEELING DOWN, DEPRESSED, IRRITABLE, OR HOPELESS: NOT AT ALL
SUM OF ALL RESPONSES TO PHQ9 QUESTIONS 1 AND 2: 0

## 2023-05-22 NOTE — CARE PLAN
The patient is Stable - Low risk of patient condition declining or worsening    Shift Goals  Clinical Goals: Tolerate diet, pain conotrol  Patient Goals: pain control, rest  Family Goals: N/A    Progress made toward(s) clinical / shift goals:      Problem: Knowledge Deficit - Standard  Goal: Patient and family/care givers will demonstrate understanding of plan of care, disease process/condition, diagnostic tests and medications  Outcome: Progressing     Problem: Pain - Standard  Goal: Alleviation of pain or a reduction in pain to the patient’s comfort goal  Outcome: Progressing

## 2023-05-22 NOTE — PROGRESS NOTES
Hospital Medicine Daily Progress Note    Date of Service  5/22/2023    Chief Complaint  River Olson is a 42 y.o. male admitted 5/17/2023 with abdominal pain    Hospital Course  River Olson is a 42 y.o. male with past medical history of gout, thalassemia minor, hypertension, pneumonia with antibiotics treatment finished last week residual cough, who presented 5/17/2023 with left lower quadrant pain which is dull, worsening with movements and cough for 3 days, fever 103.2. CT showed severe diverticulitis with diverticular abscess 2.7 x 3.0 cm between bladder and diverticulum.      Per chart review, patient has a history of grade 2 splenic laceration when fell off an [employer] in 12/2017.  Chronic splenomegaly and abnormal liver enzyme were documented at that time.     RUQ ultrasound showed no acute pathology, consistent with hepatic steatosis  AST/ALT 50/35 > 22/21  Bilirubin 5.3 (was 4.8 10mo ago) > 1.3  Procalcitonin 0.28    Interval Problem Update  Patient was seen and examined at bedside.    Tolerated GI soft.  Patient has been improving    Plan to repeat CT abdomen with contrast today  Drain output 20 cc/24h    S/p  IR drain 5/18, culture with late growth of polymicrobial with Bacteroides vulgatus, Bacteroides ovatus, Bacteroides fragilis, Parabacteroides distasonis; Streptococcus mitis/oralis, Streptococcus gordonii  Follow-up sensitivity  Continue Zosyn, may transition to Augmentin pending CT result    I discussed with surgery, no surgical intervention indicated at this point.  Patient will eventually need colonoscopy and elective sigmoidectomy.       I have discussed this patient's plan of care and discharge plan at IDT rounds today with Case Management, Nursing, Nursing leadership, and other members of the IDT team.    Consultants/Specialty  IR    Code Status  Full Code    Disposition  The patient is not medically cleared for discharge to home or a post-acute facility.      I have placed the  appropriate orders for post-discharge needs.    Review of Systems  .    Physical Exam  Temp:  [36.1 °C (97 °F)-36.7 °C (98 °F)] 36.7 °C (98 °F)  Pulse:  [81-94] 92  Resp:  [18-20] 18  BP: (121-138)/(74-91) 137/91  SpO2:  [92 %-98 %] 98 %    Physical Exam  Constitutional:       Appearance: He is ill-appearing.   HENT:      Head: Normocephalic.      Nose: Nose normal.      Mouth/Throat:      Mouth: Mucous membranes are moist.   Eyes:      General: Scleral icterus present.      Extraocular Movements: Extraocular movements intact.      Conjunctiva/sclera: Conjunctivae normal.   Cardiovascular:      Rate and Rhythm: Normal rate and regular rhythm.      Pulses: Normal pulses.      Heart sounds: Normal heart sounds.   Pulmonary:      Effort: Pulmonary effort is normal.      Breath sounds: Normal breath sounds. No wheezing or rales.   Abdominal:      General: Bowel sounds are normal.      Palpations: Abdomen is soft.      Tenderness: There is abdominal tenderness.   Musculoskeletal:         General: No swelling or tenderness.      Cervical back: Normal range of motion.   Skin:     General: Skin is warm.      Coloration: Skin is jaundiced.   Neurological:      Mental Status: He is alert and oriented to person, place, and time. Mental status is at baseline.   Psychiatric:         Mood and Affect: Mood normal.         Fluids    Intake/Output Summary (Last 24 hours) at 5/22/2023 1511  Last data filed at 5/22/2023 0900  Gross per 24 hour   Intake 240 ml   Output 10 ml   Net 230 ml       Laboratory  Recent Labs     05/20/23  0100 05/21/23  0115 05/22/23  0029   WBC 5.2 4.5* 4.1*   RBC 3.56* 3.41* 3.73*   HEMOGLOBIN 7.9* 7.8* 8.2*   HEMATOCRIT 25.7* 24.9* 27.1*   MCV 72.2* 73.0* 72.7*   MCH 22.2* 22.9* 22.0*   MCHC 30.7* 31.3* 30.3*   RDW 43.2 43.6 42.3   PLATELETCT 269 269 274   MPV  --   --  12.2     Recent Labs     05/20/23  0100 05/21/23  0115 05/22/23  0029   SODIUM 134* 136 137   POTASSIUM 3.7 3.4* 3.6   CHLORIDE 101 101  103   CO2 24 25 24   GLUCOSE 99 93 127*   BUN 8 5* 4*   CREATININE 0.47* 0.55 0.58   CALCIUM 8.1* 8.0* 8.4*                     Imaging  US-RUQ   Final Result      1. Echogenic liver, most commonly hepatic steatosis.         CT-DRAIN-PERITONEAL   Final Result      1.  CT GUIDED PERITONEAL LEFT PARAMEDIAN PELVIC CATHETER DRAINAGE. CATHETER DRAINAGE TARGETED AN EXTRALUMINAL AIR POCKET IN PROXIMITY TO THE URINARY BLADDER AND JUST INFERIOR TO THE SEGMENT OF SIGMOID COLON SHOWING ACUTE DIVERTICULITIS.   2.  THE CURRENT PLAN IS TO MONITOR DRAINAGE OUTPUT AND OBTAIN A FOLLOWUP CT SCAN IN 5-7 DAYS IF CLINICALLY INDICATED.      DX-CHEST-LIMITED (1 VIEW)   Final Result         1.  Left basilar atelectasis, no focal infiltrates.      CT-ABDOMEN-PELVIS WITH    (Results Pending)        Assessment/Plan  * Colonic diverticular abscess with SIRS- (present on admission)  Assessment & Plan  42-year-old male presented with left lower quadrant abdominal pain for 3 days and fever.  SIRS criteria included WBC 11.8, tachycardia 106, fever at home 103.2  CT of the abdomen and pelvis with contrast:  1.Wall thickening and extensive stranding in the distal sigmoid colon at the area of multiple diverticula, in keeping with severe acute diverticulitis.   2. There is a 2.7 x 3.0 cm gas collection between the urinary bladder and the diverticulitis, likely an abscess. Small amount of pelvic free fluid.  No peritoneal signs on exam.    5/22 S/p  IR drain 5/18, culture with late growth of polymicrobial with Bacteroides vulgatus, Bacteroides ovatus, Bacteroides fragilis, Parabacteroides distasonis; Streptococcus mitis/oralis, Streptococcus gordonii  Follow-up sensitivity  Plan to repeat CT abdomen with contrast today  Drain output 20 cc/24h  Continue Zosyn, may transition to Augmentin pending CT result    I discussed with surgery, no surgical intervention indicated at this point.  Patient will eventually need colonoscopy and elective sigmoidectomy.      Bilirubinemia  Assessment & Plan  Chronic.  CT of the abdomen showed status postcholecystectomy.      5/18 Bilirubin 5.3 (was 4.8 10mo ago)  AST/ALT 50/35    Resolved  RUQ ultrasound showed no acute pathology, consistent with hepatic steatosis    Urinary retention  Assessment & Plan  New urinary retention, bladder scan showed over 500cc,   straight catheter, plan for Eckert if fails again  I ordered Flomax    5/20 resolved with Flomax, no need for Eckert    Iron deficiency anemia  Assessment & Plan  Severe iron deficiency, ferritin is high due to infection  Likely due to thalassemia  Ferritin high    Hold iron replacement given thalassemia and the concern of iron overload    Splenomegaly  Assessment & Plan  Likely secondary to thalassemia  Chronic splenomegaly and abnormal liver enzyme were documented at that time.       Per chart review, patient has a history of grade 2 splenic laceration when fell off an [employer] in 12/2017.    Monitor    Cough  Assessment & Plan  Patient reportedly was treated for pneumonia last week and still has residual COVID causing worsening of abdominal pain during the bout of cough  Chest x-ray showed some left lower lobe opacification, likely atelectasis per radiology report  Will check COVID-19/influenza/RSV  Cough suppressant as needed    Hyperglycemia  Assessment & Plan  Random blood sugar 145  A1c 4.5    Continue to monitor    Hyponatremia  Assessment & Plan  Sodium 127.  Possibly secondary to hypovolemia    IV fluid  Monitor      Gout of multiple sites- (present on admission)  Assessment & Plan  Continue allopurinol    H/O thalassemia minor- (present on admission)  Assessment & Plan  Noted    Essential hypertension, benign- (present on admission)  Assessment & Plan  Will hold Micardis and felodipine  Monitor blood pressure         VTE prophylaxis: enoxaparin ppx    I have performed a physical exam and reviewed and updated ROS and Plan today (5/22/2023). In review of yesterday's note  (5/21/2023), there are no changes except as documented above.        ROS

## 2023-05-22 NOTE — PROGRESS NOTES
"  DATE: 5/22/2023    Hospital Day 5  diverticular abscess .    INTERVAL EVENTS:  Feels much better  Tolerating a GI soft diet  No complaints  Hoping to go home soon.    REVIEW OF SYSTEMS:  Comprehensive review of systems is negative with the exception of the aforementioned HPI, PMH, and PSH bullets in accordance with CMS guidelines.    PHYSICAL EXAMINATION:    Vital Signs: /79   Pulse 81   Temp 36.2 °C (97.2 °F) (Temporal)   Resp 18   Ht 1.803 m (5' 11\")   Wt 95.1 kg (209 lb 10.5 oz)   SpO2 92%   Physical Exam  General: Laying in bed and in no distress  HEENT: Pupils equally round reactive to light, extraocular muscles intact, oropharynx without lesions  Neck: Supple with full range of motion  Chest: Bilateral breath sounds with symmetrical chest excursion  Cardiovascular: Regular rate and rhythm  Abdomen: Soft, nontender, nondistended, drain in place to his feculent drainage, low-volume  Extremities: No clubbing, cyanosis, or edema  Neurologic: Grossly intact  Vascular: Palpable radial femoral pulses  Skin: Warm and dry  Psychiatric: Interacts appropriately  LABORATORY VALUES:   Recent Labs     05/20/23 0100 05/21/23 0115 05/22/23 0029   WBC 5.2 4.5* 4.1*   RBC 3.56* 3.41* 3.73*   HEMOGLOBIN 7.9* 7.8* 8.2*   HEMATOCRIT 25.7* 24.9* 27.1*   MCV 72.2* 73.0* 72.7*   MCH 22.2* 22.9* 22.0*   MCHC 30.7* 31.3* 30.3*   RDW 43.2 43.6 42.3   PLATELETCT 269 269 274   MPV  --   --  12.2     Recent Labs     05/20/23 0100 05/21/23  0115 05/22/23  0029   SODIUM 134* 136 137   POTASSIUM 3.7 3.4* 3.6   CHLORIDE 101 101 103   CO2 24 25 24   GLUCOSE 99 93 127*   BUN 8 5* 4*   CREATININE 0.47* 0.55 0.58   CALCIUM 8.1* 8.0* 8.4*     Recent Labs     05/20/23  0100 05/21/23  0115 05/22/23  0029   ASTSGOT 22 20 22   ALTSGPT 21 20 23   TBILIRUBIN 2.0* 1.3 1.3   ALKPHOSPHAT 83 78 77   GLOBULIN 3.5 3.3 3.3            IMAGING:   US-RUQ   Final Result      1. Echogenic liver, most commonly hepatic steatosis.       "   CT-DRAIN-PERITONEAL   Final Result      1.  CT GUIDED PERITONEAL LEFT PARAMEDIAN PELVIC CATHETER DRAINAGE. CATHETER DRAINAGE TARGETED AN EXTRALUMINAL AIR POCKET IN PROXIMITY TO THE URINARY BLADDER AND JUST INFERIOR TO THE SEGMENT OF SIGMOID COLON SHOWING ACUTE DIVERTICULITIS.   2.  THE CURRENT PLAN IS TO MONITOR DRAINAGE OUTPUT AND OBTAIN A FOLLOWUP CT SCAN IN 5-7 DAYS IF CLINICALLY INDICATED.      DX-CHEST-LIMITED (1 VIEW)   Final Result         1.  Left basilar atelectasis, no focal infiltrates.          ASSESSMENT AND PLAN:     * Colonic diverticular abscess with SIRS- (present on admission)  Assessment & Plan  Admitted on 5/17 for complicated diverticulitis.  Status-post percutaneous drainage 5/18.  Abdominal exam without peritonitis.  I discussed the surgical management of complicated diverticular disease with the patient. We reviewed that under ideal circumstances his current episode could be managed non-operatively with antibiotics and percutaneous drainage with plans for interval colonoscopy and elective sigmoidectomy. Should surgery be necessary urgently he will likely require an ostomy of some form.  5/21 Abdominal exam remains benign and pt tolerating regular diet.  No indications for acute surgical intervention at this time. Recommend continued antibiotics and drain monitoring with eventual outpatient colonoscopy and elective sigmoidectomy.  [facility] Blue Service.    Clinically stable   Repeat CT pending   Plan:   Review CT   continue IV antibiotics  Continue drain  Likely home in 1 to 2 days on p.o. antibiotics         ____________________________________     Cipriano Arteaga M.D.    DD: 5/22/2023  11:13 AM

## 2023-05-23 VITALS
RESPIRATION RATE: 16 BRPM | DIASTOLIC BLOOD PRESSURE: 88 MMHG | OXYGEN SATURATION: 98 % | SYSTOLIC BLOOD PRESSURE: 136 MMHG | WEIGHT: 209.66 LBS | TEMPERATURE: 97.8 F | BODY MASS INDEX: 29.35 KG/M2 | HEART RATE: 75 BPM | HEIGHT: 71 IN

## 2023-05-23 PROBLEM — E80.6 BILIRUBINEMIA: Status: RESOLVED | Noted: 2023-05-18 | Resolved: 2023-05-23

## 2023-05-23 PROBLEM — E87.1 HYPONATREMIA: Status: RESOLVED | Noted: 2023-05-18 | Resolved: 2023-05-23

## 2023-05-23 PROBLEM — R33.9 URINARY RETENTION: Status: RESOLVED | Noted: 2023-05-19 | Resolved: 2023-05-23

## 2023-05-23 PROBLEM — R05.9 COUGH: Status: RESOLVED | Noted: 2023-05-18 | Resolved: 2023-05-23

## 2023-05-23 LAB
ALBUMIN SERPL BCP-MCNC: 3.1 G/DL (ref 3.2–4.9)
ALBUMIN/GLOB SERPL: 0.9 G/DL
ALP SERPL-CCNC: 69 U/L (ref 30–99)
ALT SERPL-CCNC: 19 U/L (ref 2–50)
ANION GAP SERPL CALC-SCNC: 8 MMOL/L (ref 7–16)
AST SERPL-CCNC: 20 U/L (ref 12–45)
BACTERIA BLD CULT: NORMAL
BACTERIA BLD CULT: NORMAL
BILIRUB SERPL-MCNC: 1.2 MG/DL (ref 0.1–1.5)
BUN SERPL-MCNC: 4 MG/DL (ref 8–22)
CALCIUM ALBUM COR SERPL-MCNC: 9 MG/DL (ref 8.5–10.5)
CALCIUM SERPL-MCNC: 8.3 MG/DL (ref 8.5–10.5)
CHLORIDE SERPL-SCNC: 105 MMOL/L (ref 96–112)
CO2 SERPL-SCNC: 25 MMOL/L (ref 20–33)
CREAT SERPL-MCNC: 0.7 MG/DL (ref 0.5–1.4)
ERYTHROCYTE [DISTWIDTH] IN BLOOD BY AUTOMATED COUNT: 41.5 FL (ref 35.9–50)
GFR SERPLBLD CREATININE-BSD FMLA CKD-EPI: 117 ML/MIN/1.73 M 2
GLOBULIN SER CALC-MCNC: 3.4 G/DL (ref 1.9–3.5)
GLUCOSE SERPL-MCNC: 121 MG/DL (ref 65–99)
HCT VFR BLD AUTO: 25.3 % (ref 42–52)
HGB BLD-MCNC: 7.9 G/DL (ref 14–18)
MCH RBC QN AUTO: 21.9 PG (ref 27–33)
MCHC RBC AUTO-ENTMCNC: 31.2 G/DL (ref 32.3–36.5)
MCV RBC AUTO: 70.1 FL (ref 81.4–97.8)
PLATELET # BLD AUTO: 282 K/UL (ref 164–446)
PMV BLD AUTO: 12 FL (ref 9–12.9)
POTASSIUM SERPL-SCNC: 3.9 MMOL/L (ref 3.6–5.5)
PROT SERPL-MCNC: 6.5 G/DL (ref 6–8.2)
RBC # BLD AUTO: 3.61 M/UL (ref 4.7–6.1)
SIGNIFICANT IND 70042: NORMAL
SIGNIFICANT IND 70042: NORMAL
SITE SITE: NORMAL
SITE SITE: NORMAL
SODIUM SERPL-SCNC: 138 MMOL/L (ref 135–145)
SOURCE SOURCE: NORMAL
SOURCE SOURCE: NORMAL
WBC # BLD AUTO: 4.5 K/UL (ref 4.8–10.8)

## 2023-05-23 PROCEDURE — 700111 HCHG RX REV CODE 636 W/ 250 OVERRIDE (IP): Performed by: INTERNAL MEDICINE

## 2023-05-23 PROCEDURE — 700111 HCHG RX REV CODE 636 W/ 250 OVERRIDE (IP): Performed by: HOSPITALIST

## 2023-05-23 PROCEDURE — A9270 NON-COVERED ITEM OR SERVICE: HCPCS | Performed by: INTERNAL MEDICINE

## 2023-05-23 PROCEDURE — 700102 HCHG RX REV CODE 250 W/ 637 OVERRIDE(OP): Performed by: INTERNAL MEDICINE

## 2023-05-23 PROCEDURE — 700105 HCHG RX REV CODE 258: Performed by: STUDENT IN AN ORGANIZED HEALTH CARE EDUCATION/TRAINING PROGRAM

## 2023-05-23 PROCEDURE — 80053 COMPREHEN METABOLIC PANEL: CPT

## 2023-05-23 PROCEDURE — 99239 HOSP IP/OBS DSCHRG MGMT >30: CPT | Performed by: HOSPITALIST

## 2023-05-23 PROCEDURE — 700111 HCHG RX REV CODE 636 W/ 250 OVERRIDE (IP): Performed by: STUDENT IN AN ORGANIZED HEALTH CARE EDUCATION/TRAINING PROGRAM

## 2023-05-23 PROCEDURE — 700102 HCHG RX REV CODE 250 W/ 637 OVERRIDE(OP): Performed by: STUDENT IN AN ORGANIZED HEALTH CARE EDUCATION/TRAINING PROGRAM

## 2023-05-23 PROCEDURE — A9270 NON-COVERED ITEM OR SERVICE: HCPCS | Performed by: STUDENT IN AN ORGANIZED HEALTH CARE EDUCATION/TRAINING PROGRAM

## 2023-05-23 PROCEDURE — 85027 COMPLETE CBC AUTOMATED: CPT

## 2023-05-23 PROCEDURE — 99232 SBSQ HOSP IP/OBS MODERATE 35: CPT | Performed by: SURGERY

## 2023-05-23 RX ORDER — AMOXICILLIN AND CLAVULANATE POTASSIUM 500; 125 MG/1; MG/1
2 TABLET, FILM COATED ORAL EVERY 12 HOURS
Status: DISCONTINUED | OUTPATIENT
Start: 2023-05-23 | End: 2023-05-23 | Stop reason: HOSPADM

## 2023-05-23 RX ORDER — ONDANSETRON 4 MG/1
4 TABLET, FILM COATED ORAL EVERY 4 HOURS PRN
Qty: 20 TABLET | Refills: 0 | Status: ON HOLD | OUTPATIENT
Start: 2023-05-23 | End: 2023-07-26

## 2023-05-23 RX ORDER — OXYCODONE HYDROCHLORIDE 5 MG/1
5 TABLET ORAL EVERY 8 HOURS PRN
Qty: 9 TABLET | Refills: 0 | Status: SHIPPED | OUTPATIENT
Start: 2023-05-23 | End: 2023-05-28

## 2023-05-23 RX ORDER — KETOROLAC TROMETHAMINE 30 MG/ML
15 INJECTION, SOLUTION INTRAMUSCULAR; INTRAVENOUS ONCE
Status: COMPLETED | OUTPATIENT
Start: 2023-05-23 | End: 2023-05-23

## 2023-05-23 RX ORDER — ACETAMINOPHEN 500 MG
500-1000 TABLET ORAL EVERY 6 HOURS PRN
COMMUNITY
Start: 2023-05-23 | End: 2023-08-08

## 2023-05-23 RX ORDER — AMOXICILLIN AND CLAVULANATE POTASSIUM 500; 125 MG/1; MG/1
2 TABLET, FILM COATED ORAL EVERY 12 HOURS
Qty: 56 TABLET | Refills: 0 | Status: ACTIVE | OUTPATIENT
Start: 2023-05-23 | End: 2023-06-06

## 2023-05-23 RX ADMIN — FLUOXETINE 20 MG: 20 CAPSULE ORAL at 04:53

## 2023-05-23 RX ADMIN — TAMSULOSIN HYDROCHLORIDE 0.4 MG: 0.4 CAPSULE ORAL at 08:24

## 2023-05-23 RX ADMIN — OXYCODONE 2.5 MG: 5 TABLET ORAL at 08:25

## 2023-05-23 RX ADMIN — ALLOPURINOL 100 MG: 100 TABLET ORAL at 04:53

## 2023-05-23 RX ADMIN — KETOROLAC TROMETHAMINE 15 MG: 30 INJECTION, SOLUTION INTRAMUSCULAR; INTRAVENOUS at 11:01

## 2023-05-23 RX ADMIN — PIPERACILLIN AND TAZOBACTAM 3.38 G: 3; .375 INJECTION, POWDER, FOR SOLUTION INTRAVENOUS at 04:54

## 2023-05-23 RX ADMIN — FAMOTIDINE 20 MG: 10 INJECTION INTRAVENOUS at 04:54

## 2023-05-23 ASSESSMENT — PAIN DESCRIPTION - PAIN TYPE
TYPE: ACUTE PAIN;SURGICAL PAIN
TYPE: ACUTE PAIN;SURGICAL PAIN

## 2023-05-23 NOTE — CARE PLAN
Problem: Knowledge Deficit - Standard  Goal: Patient and family/care givers will demonstrate understanding of plan of care, disease process/condition, diagnostic tests and medications  Outcome: Progressing     Problem: Pain - Standard  Goal: Alleviation of pain or a reduction in pain to the patient’s comfort goal  Outcome: Progressing       The patient is Stable - Low risk of patient condition declining or worsening    Shift Goals  Clinical Goals: Monitor VS, labs, pain control, HEATHER output and rest  Patient Goals: Comfort and rest  Family Goals: N/A    Progress made toward(s) clinical / shift goals:  Plan of care discussed with patient and verbalizes understanding. Pain medications given and effective.    Patient is not progressing towards the following goals:

## 2023-05-23 NOTE — PROGRESS NOTES
D/c instructions given, educated on worsening s/s. Pt understands and questions answered. D/c to home by self

## 2023-05-23 NOTE — DISCHARGE SUMMARY
Discharge Summary    CHIEF COMPLAINT ON ADMISSION  Chief Complaint   Patient presents with    Abdominal Pain     Since 1930 pm Monday. Denies n/v/d.     Sent from Urgent Care     Pt was sent here for diverticulitis per        Reason for Admission  Sent by      Admission Date  5/17/2023    CODE STATUS  Full Code    HPI & HOSPITAL COURSE  Hospital Course  River Olson is a 42 y.o. male with past medical history of gout, thalassemia minor, hypertension, pneumonia with antibiotics treatment finished last week residual cough, who presented 5/17/2023 with left lower quadrant pain which is dull, worsening with movements and cough for 3 days, fever 103.2. CT showed severe diverticulitis with diverticular abscess 2.7 x 3.0 cm between bladder and diverticulum.    The patient was evaluated by surgery and underwent drain placement by interventional radiology on 5/18/2023 with cultures growing polymicrobial with Bacteroides para Bacteroides Streptococcus  He was initially on Zosyn repeat CT on 5/22/2023 revealed improvement in the size of the abscess.  Clinically the patient is improved he has been tolerating his diet.  He is afebrile and his abdominal exam is benign  I discussed this case with Dr. Arteaga from surgery today and he is recommending to transition the patient to p.o. antibiotics discontinuing the drain as it has not been having any output and outpatient follow-up with surgery to discuss outpatient colonoscopy and elective sigmoidectomy    Therefore, he is discharged in good and stable condition to home with close outpatient follow-up.    The patient met 2-midnight criteria for an inpatient stay at the time of discharge.    Discharge Date  5/23/2023    FOLLOW UP ITEMS POST DISCHARGE  Follow-up with surgery in 1 to 2 weeks  Follow-up with PCP  Repeat CBC to follow-up on anemia as outpatient    DISCHARGE DIAGNOSES  Principal Problem:    Colonic diverticular abscess with SIRS (POA: Yes)  Active Problems:     Essential hypertension, benign (POA: Yes)      Overview: Chronic condition treated with losartan and felodipine.      11/29 - Resume home medications when clinically indicated           H/O thalassemia minor (POA: Yes)      Overview: Chronic condition. No outpatient treatment.    Gout of multiple sites (POA: Yes)    Hyperglycemia (POA: Unknown)    Splenomegaly (POA: Unknown)    Iron deficiency anemia (POA: Unknown)  Resolved Problems:    Hyponatremia (POA: Unknown)    Bilirubinemia (POA: Unknown)    Cough (POA: Unknown)    Anemia (POA: Unknown)    Urinary retention (POA: Unknown)      FOLLOW UP  No future appointments.  Cipriano Arteaga M.D.  [address] [place] [id number] [phone]    Follow up        MEDICATIONS ON DISCHARGE     Medication List        START taking these medications        Instructions   amoxicillin-clavulanate 500-125 MG Tabs  Commonly known as: AUGMENTIN   Take 2 Tablets by mouth every 12 hours for 14 days.  Dose: 2 Tablet     ondansetron 4 MG Tabs tablet  Commonly known as: Zofran   Take 1 Tablet by mouth every four hours as needed for Nausea/Vomiting.  Dose: 4 mg     oxyCODONE immediate-release 5 MG Tabs  Commonly known as: ROXICODONE   Take 1 Tablet by mouth every 8 hours as needed for Severe Pain for up to 5 days.  Dose: 5 mg            CHANGE how you take these medications        Instructions   acetaminophen 500 MG Tabs  What changed:   how much to take  when to take this  reasons to take this  Commonly known as: TYLENOL   Take 1-2 Tablets by mouth every 6 hours as needed for Mild Pain or Moderate Pain.  Dose: 500-1,000 mg            CONTINUE taking these medications        Instructions   albuterol 108 (90 Base) MCG/ACT Aers inhalation aerosol   Inhale 2 Puffs every four hours as needed for Shortness of Breath.  Dose: 2 Puff     allopurinol 100 MG Tabs  Commonly known as: ZYLOPRIM   TAKE 1 TABLET BY MOUTH EVERY DAY  Dose: 100 mg     cetirizine 10 MG Tabs  Commonly known as:  ZYRTEC   Take 10 mg by mouth 1 time a day as needed for Allergies.  Dose: 10 mg     felodipine ER 10 MG Tb24  Commonly known as: Plendil   Take 10 mg by mouth every day.  Dose: 10 mg     FLUoxetine 20 MG Caps  Commonly known as: PROZAC   Take 20 mg by mouth every day.  Dose: 20 mg     ibuprofen 200 MG Tabs  Commonly known as: MOTRIN   Take 600 mg by mouth 1 time a day as needed for Mild Pain.  Dose: 600 mg     QUEtiapine 50 MG tablet  Commonly known as: Seroquel   Take  mg by mouth at bedtime. 1 to 3 tablets  Dose:  mg     telmisartan 40 MG Tabs  Commonly known as: MICARDIS   Take 40 mg by mouth every day.  Dose: 40 mg     triamcinolone acetonide 40 MG/ML Susp  Commonly known as: KENALOG-40   Inject 30 mg into the shoulder, thigh, or buttocks one time.  Dose: 30 mg            STOP taking these medications      azithromycin 250 MG Tabs  Commonly known as: ZITHROMAX     Mucinex D Max Strength 120-1200 MG Tb12  Generic drug: Pseudoephedrine-Guaifenesin              Allergies  No Known Allergies    DIET  Orders Placed This Encounter   Procedures    Diet Order Diet: Low Fiber(GI Soft)     Standing Status:   Standing     Number of Occurrences:   1     Order Specific Question:   Diet:     Answer:   Low Fiber(GI Soft) [2]       ACTIVITY  As tolerated.  Weight bearing as tolerated    CONSULTATIONS  Surgery    PROCEDURES  CT-guided catheter drainage of abdominal abscess    LABORATORY  Lab Results   Component Value Date    SODIUM 138 05/23/2023    POTASSIUM 3.9 05/23/2023    CHLORIDE 105 05/23/2023    CO2 25 05/23/2023    GLUCOSE 121 (H) 05/23/2023    BUN 4 (L) 05/23/2023    CREATININE 0.70 05/23/2023        Lab Results   Component Value Date    WBC 4.5 (L) 05/23/2023    HEMOGLOBIN 7.9 (L) 05/23/2023    HEMATOCRIT 25.3 (L) 05/23/2023    PLATELETCT 282 05/23/2023        Total time of the discharge process exceeds 35 minutes.

## 2023-05-23 NOTE — PROGRESS NOTES
"  DATE: 5/23/2023    Hospital Day 6  complicated diverticulitis with abscess .    INTERVAL EVENTS:  Feels well  Tolerating his GI soft diet  Wishes to go home.    REVIEW OF SYSTEMS:  Comprehensive review of systems is negative with the exception of the aforementioned HPI, PMH, and PSH bullets in accordance with CMS guidelines.    PHYSICAL EXAMINATION:    Vital Signs: /88   Pulse 75   Temp 36.6 °C (97.8 °F) (Temporal)   Resp 16   Ht 1.803 m (5' 11\")   Wt 95.1 kg (209 lb 10.5 oz)   SpO2 98%   Physical Exam  General: Laying in bed and in no distress  HEENT: Pupils equally round reactive to light, extraocular muscles intact, oropharynx without lesions  Neck: Supple with full range of motion  Chest: Bilateral breath sounds with symmetrical chest excursion  Cardiovascular: Regular rate and rhythm  Abdomen: Soft, nontender, nondistended, drain with minimal purulent output  Extremities: No clubbing, cyanosis, or edema  Neurologic: Grossly intact  Vascular: Palpable radial femoral pulses  Skin: Warm and dry  Psychiatric: Interacts appropriately  LABORATORY VALUES:   Recent Labs     05/21/23 0115 05/22/23  0029 05/23/23  0026   WBC 4.5* 4.1* 4.5*   RBC 3.41* 3.73* 3.61*   HEMOGLOBIN 7.8* 8.2* 7.9*   HEMATOCRIT 24.9* 27.1* 25.3*   MCV 73.0* 72.7* 70.1*   MCH 22.9* 22.0* 21.9*   MCHC 31.3* 30.3* 31.2*   RDW 43.6 42.3 41.5   PLATELETCT 269 274 282   MPV  --  12.2 12.0     Recent Labs     05/21/23  0115 05/22/23  0029 05/23/23  0026   SODIUM 136 137 138   POTASSIUM 3.4* 3.6 3.9   CHLORIDE 101 103 105   CO2 25 24 25   GLUCOSE 93 127* 121*   BUN 5* 4* 4*   CREATININE 0.55 0.58 0.70   CALCIUM 8.0* 8.4* 8.3*     Recent Labs     05/21/23  0115 05/22/23  0029 05/23/23  0026   ASTSGOT 20 22 20   ALTSGPT 20 23 19   TBILIRUBIN 1.3 1.3 1.2   ALKPHOSPHAT 78 77 69   GLOBULIN 3.3 3.3 3.4            IMAGING:   CT-ABDOMEN-PELVIS WITH   Final Result         1.  Diverticulosis with changes of sigmoid diverticulitis.   2.  1.2 cm " residual enhancing abscess inferior to the pigtail catheter, overall size of abscess is significantly decreased since prior study.   3.  Enhancing fluid collection the right lower pelvis, appearance favors small abscess.   4.  Diffuse bladder wall thickening, greatest along the bladder dome, consider changes of cystitis.   5.  Atherosclerosis   6.  Splenomegaly   7.  Grade 1 spondylolisthesis with bilateral L5 spondylolysis.      US-RUQ   Final Result      1. Echogenic liver, most commonly hepatic steatosis.         CT-DRAIN-PERITONEAL   Final Result      1.  CT GUIDED PERITONEAL LEFT PARAMEDIAN PELVIC CATHETER DRAINAGE. CATHETER DRAINAGE TARGETED AN EXTRALUMINAL AIR POCKET IN PROXIMITY TO THE URINARY BLADDER AND JUST INFERIOR TO THE SEGMENT OF SIGMOID COLON SHOWING ACUTE DIVERTICULITIS.   2.  THE CURRENT PLAN IS TO MONITOR DRAINAGE OUTPUT AND OBTAIN A FOLLOWUP CT SCAN IN 5-7 DAYS IF CLINICALLY INDICATED.      DX-CHEST-LIMITED (1 VIEW)   Final Result         1.  Left basilar atelectasis, no focal infiltrates.          ASSESSMENT AND PLAN:     * Colonic diverticular abscess with SIRS- (present on admission)  Assessment & Plan  Admitted on 5/17 for complicated diverticulitis.  Status-post percutaneous drainage 5/18.  Abdominal exam without peritonitis.  I discussed the surgical management of complicated diverticular disease with the patient. We reviewed that under ideal circumstances his current episode could be managed non-operatively with antibiotics and percutaneous drainage with plans for interval colonoscopy and elective sigmoidectomy. Should surgery be necessary urgently he will likely require an ostomy of some form.  5/21 Abdominal exam remains benign and pt tolerating regular diet.  No indications for acute surgical intervention at this time. Recommend continued antibiotics and drain monitoring with eventual outpatient colonoscopy and elective sigmoidectomy.  [facility].    Doing well  DC  drain  DC home  Follow-up in 1 to 2 weeks in ACS clinic         ____________________________________     Cipriano Arteaga M.D.    DD: 5/23/2023  10:24 AM

## 2023-05-23 NOTE — PROGRESS NOTES
Assumed care of patient 0700. Received Report from [facility] nurse. Patient A&O 4, on RA, Reporting a pain level of 3. Call light within reach, belongings within reach, Fall precautions in place, bed in lowest position. Patient does not have any other needs at this time. Hourly rounding.    POC was discussed with patient. All questions were answered. Patient verbalized understanding.

## 2023-05-23 NOTE — PROGRESS NOTES
Joni drain removed, tube length intact and no bleeding from site after removal, zero drain noted from bulb, site dressed with gauze and tape

## 2023-05-23 NOTE — CARE PLAN
The patient is Stable - Low risk of patient condition declining or worsening    Shift Goals  Clinical Goals: Monitor drain output  Patient Goals: Comfort, rest    Progress made toward(s) clinical / shift goals:  Patient had minimal output from his HEATHER drain overnight. Patient received pain medication as needed, which allowed him to sleep comfortably in bed. Patient also received IV antibiotics as ordered.       Problem: Knowledge Deficit - Standard  Goal: Patient and family/care givers will demonstrate understanding of plan of care, disease process/condition, diagnostic tests and medications  Outcome: Progressing     Problem: Pain - Standard  Goal: Alleviation of pain or a reduction in pain to the patient’s comfort goal  Outcome: Progressing       Patient is not progressing towards the following goals:

## 2023-06-26 ENCOUNTER — APPOINTMENT (OUTPATIENT)
Dept: ADMISSIONS | Facility: MEDICAL CENTER | Age: 43
DRG: 331 | End: 2023-06-26
Attending: SURGERY
Payer: COMMERCIAL

## 2023-07-06 ENCOUNTER — PRE-ADMISSION TESTING (OUTPATIENT)
Dept: ADMISSIONS | Facility: MEDICAL CENTER | Age: 43
DRG: 331 | End: 2023-07-06
Attending: SURGERY
Payer: COMMERCIAL

## 2023-07-06 DIAGNOSIS — Z01.812 PRE-OPERATIVE LABORATORY EXAMINATION: ICD-10-CM

## 2023-07-06 RX ORDER — COLCHICINE 0.6 MG/1
0.6 TABLET ORAL 2 TIMES DAILY
COMMUNITY
Start: 2023-06-14 | End: 2023-08-08

## 2023-07-06 RX ORDER — POLYETHYLENE GLYCOL 3350 17 G/17G
17 POWDER, FOR SOLUTION ORAL DAILY
Status: ON HOLD | COMMUNITY
End: 2023-07-26

## 2023-07-17 ENCOUNTER — PRE-ADMISSION TESTING (OUTPATIENT)
Dept: ADMISSIONS | Facility: MEDICAL CENTER | Age: 43
DRG: 331 | End: 2023-07-17
Attending: SURGERY
Payer: COMMERCIAL

## 2023-07-17 ENCOUNTER — HOSPITAL ENCOUNTER (OUTPATIENT)
Dept: LAB | Facility: MEDICAL CENTER | Age: 43
End: 2023-07-17
Attending: SURGERY | Admitting: SURGERY
Payer: COMMERCIAL

## 2023-07-17 DIAGNOSIS — Z01.812 PRE-OPERATIVE LABORATORY EXAMINATION: ICD-10-CM

## 2023-07-17 LAB
25(OH)D3 SERPL-MCNC: 21 NG/ML (ref 30–100)
ALBUMIN SERPL BCP-MCNC: 4.1 G/DL (ref 3.2–4.9)
ALBUMIN/GLOB SERPL: 1.4 G/DL
ALP SERPL-CCNC: 74 U/L (ref 30–99)
ALT SERPL-CCNC: 18 U/L (ref 2–50)
ANION GAP SERPL CALC-SCNC: 11 MMOL/L (ref 7–16)
ANION GAP SERPL CALC-SCNC: 13 MMOL/L (ref 7–16)
AST SERPL-CCNC: 27 U/L (ref 12–45)
BILIRUB SERPL-MCNC: 2.3 MG/DL (ref 0.1–1.5)
BUN SERPL-MCNC: 11 MG/DL (ref 8–22)
BUN SERPL-MCNC: 12 MG/DL (ref 8–22)
CALCIUM ALBUM COR SERPL-MCNC: 8.7 MG/DL (ref 8.5–10.5)
CALCIUM SERPL-MCNC: 8.7 MG/DL (ref 8.5–10.5)
CALCIUM SERPL-MCNC: 8.8 MG/DL (ref 8.5–10.5)
CHLORIDE SERPL-SCNC: 101 MMOL/L (ref 96–112)
CHLORIDE SERPL-SCNC: 97 MMOL/L (ref 96–112)
CHOLEST SERPL-MCNC: 88 MG/DL (ref 100–199)
CO2 SERPL-SCNC: 23 MMOL/L (ref 20–33)
CO2 SERPL-SCNC: 24 MMOL/L (ref 20–33)
CREAT SERPL-MCNC: 0.64 MG/DL (ref 0.5–1.4)
CREAT SERPL-MCNC: 0.66 MG/DL (ref 0.5–1.4)
CREAT UR-MCNC: 69 MG/DL
ERYTHROCYTE [DISTWIDTH] IN BLOOD BY AUTOMATED COUNT: 48.8 FL (ref 35.9–50)
GFR SERPLBLD CREATININE-BSD FMLA CKD-EPI: 119 ML/MIN/1.73 M 2
GFR SERPLBLD CREATININE-BSD FMLA CKD-EPI: 120 ML/MIN/1.73 M 2
GLOBULIN SER CALC-MCNC: 3 G/DL (ref 1.9–3.5)
GLUCOSE SERPL-MCNC: 101 MG/DL (ref 65–99)
GLUCOSE SERPL-MCNC: 113 MG/DL (ref 65–99)
HCT VFR BLD AUTO: 38.8 % (ref 42–52)
HDLC SERPL-MCNC: 56 MG/DL
HGB BLD-MCNC: 11.8 G/DL (ref 14–18)
LDLC SERPL CALC-MCNC: 19 MG/DL
MCH RBC QN AUTO: 21.3 PG (ref 27–33)
MCHC RBC AUTO-ENTMCNC: 30.4 G/DL (ref 32.3–36.5)
MCV RBC AUTO: 70.2 FL (ref 81.4–97.8)
MICROALBUMIN UR-MCNC: 1.8 MG/DL
MICROALBUMIN/CREAT UR: 26 MG/G (ref 0–30)
PLATELET # BLD AUTO: 257 K/UL (ref 164–446)
POTASSIUM SERPL-SCNC: 3.9 MMOL/L (ref 3.6–5.5)
POTASSIUM SERPL-SCNC: 4 MMOL/L (ref 3.6–5.5)
PROT SERPL-MCNC: 7.1 G/DL (ref 6–8.2)
RBC # BLD AUTO: 5.53 M/UL (ref 4.7–6.1)
SODIUM SERPL-SCNC: 133 MMOL/L (ref 135–145)
SODIUM SERPL-SCNC: 136 MMOL/L (ref 135–145)
T4 FREE SERPL-MCNC: 0.89 NG/DL (ref 0.93–1.7)
TRIGL SERPL-MCNC: 64 MG/DL (ref 0–149)
TSH SERPL DL<=0.005 MIU/L-ACNC: 1.09 UIU/ML (ref 0.38–5.33)
VIT B12 SERPL-MCNC: 336 PG/ML (ref 211–911)
WBC # BLD AUTO: 6.3 K/UL (ref 4.8–10.8)

## 2023-07-17 PROCEDURE — 36415 COLL VENOUS BLD VENIPUNCTURE: CPT

## 2023-07-17 PROCEDURE — 80053 COMPREHEN METABOLIC PANEL: CPT

## 2023-07-17 PROCEDURE — 82607 VITAMIN B-12: CPT

## 2023-07-17 PROCEDURE — 82306 VITAMIN D 25 HYDROXY: CPT

## 2023-07-17 PROCEDURE — 83036 HEMOGLOBIN GLYCOSYLATED A1C: CPT

## 2023-07-17 PROCEDURE — 84439 ASSAY OF FREE THYROXINE: CPT

## 2023-07-17 PROCEDURE — 84443 ASSAY THYROID STIM HORMONE: CPT

## 2023-07-17 PROCEDURE — 85055 RETICULATED PLATELET ASSAY: CPT

## 2023-07-17 PROCEDURE — 85025 COMPLETE CBC W/AUTO DIFF WBC: CPT

## 2023-07-17 PROCEDURE — 82570 ASSAY OF URINE CREATININE: CPT

## 2023-07-17 PROCEDURE — 85027 COMPLETE CBC AUTOMATED: CPT

## 2023-07-17 PROCEDURE — 80061 LIPID PANEL: CPT

## 2023-07-17 PROCEDURE — 80048 BASIC METABOLIC PNL TOTAL CA: CPT

## 2023-07-17 PROCEDURE — 82043 UR ALBUMIN QUANTITATIVE: CPT

## 2023-07-18 LAB
BASOPHILS # BLD AUTO: 0.9 % (ref 0–1.8)
BASOPHILS # BLD: 0.06 K/UL (ref 0–0.12)
EOSINOPHIL # BLD AUTO: 0.03 K/UL (ref 0–0.51)
EOSINOPHIL NFR BLD: 0.5 % (ref 0–6.9)
ERYTHROCYTE [DISTWIDTH] IN BLOOD BY AUTOMATED COUNT: 52.1 FL (ref 35.9–50)
EST. AVERAGE GLUCOSE BLD GHB EST-MCNC: 82 MG/DL
HBA1C MFR BLD: 4.5 % (ref 4–5.6)
HCT VFR BLD AUTO: 40.4 % (ref 42–52)
HGB BLD-MCNC: 12.1 G/DL (ref 14–18)
IMM GRANULOCYTES # BLD AUTO: 0.09 K/UL (ref 0–0.11)
IMM GRANULOCYTES NFR BLD AUTO: 1.4 % (ref 0–0.9)
LYMPHOCYTES # BLD AUTO: 1.79 K/UL (ref 1–4.8)
LYMPHOCYTES NFR BLD: 27.2 % (ref 22–41)
MCH RBC QN AUTO: 21.2 PG (ref 27–33)
MCHC RBC AUTO-ENTMCNC: 30 G/DL (ref 32.3–36.5)
MCV RBC AUTO: 70.6 FL (ref 81.4–97.8)
MONOCYTES # BLD AUTO: 0.48 K/UL (ref 0–0.85)
MONOCYTES NFR BLD AUTO: 7.3 % (ref 0–13.4)
NEUTROPHILS # BLD AUTO: 4.14 K/UL (ref 1.82–7.42)
NEUTROPHILS NFR BLD: 62.7 % (ref 44–72)
NRBC # BLD AUTO: 0.05 K/UL
NRBC BLD-RTO: 0.8 /100 WBC (ref 0–0.2)
PLATELET # BLD AUTO: 171 K/UL (ref 164–446)
PLATELETS.RETICULATED NFR BLD AUTO: 6.7 % (ref 0.6–13.1)
RBC # BLD AUTO: 5.72 M/UL (ref 4.7–6.1)
WBC # BLD AUTO: 6.6 K/UL (ref 4.8–10.8)

## 2023-07-26 ENCOUNTER — ANESTHESIA (OUTPATIENT)
Dept: SURGERY | Facility: MEDICAL CENTER | Age: 43
DRG: 331 | End: 2023-07-26
Payer: COMMERCIAL

## 2023-07-26 ENCOUNTER — ANESTHESIA EVENT (OUTPATIENT)
Dept: SURGERY | Facility: MEDICAL CENTER | Age: 43
DRG: 331 | End: 2023-07-26
Payer: COMMERCIAL

## 2023-07-26 ENCOUNTER — HOSPITAL ENCOUNTER (INPATIENT)
Facility: MEDICAL CENTER | Age: 43
LOS: 2 days | DRG: 331 | End: 2023-07-28
Attending: SURGERY | Admitting: SURGERY
Payer: COMMERCIAL

## 2023-07-26 DIAGNOSIS — G89.18 POST-OPERATIVE PAIN: ICD-10-CM

## 2023-07-26 PROBLEM — K57.92 DIVERTICULITIS: Status: ACTIVE | Noted: 2023-07-26

## 2023-07-26 LAB
GLUCOSE BLD STRIP.AUTO-MCNC: 145 MG/DL (ref 65–99)
GLUCOSE BLD STRIP.AUTO-MCNC: 155 MG/DL (ref 65–99)
GLUCOSE BLD STRIP.AUTO-MCNC: 190 MG/DL (ref 65–99)
PATHOLOGY CONSULT NOTE: NORMAL

## 2023-07-26 PROCEDURE — 82962 GLUCOSE BLOOD TEST: CPT | Mod: 91

## 2023-07-26 PROCEDURE — 700101 HCHG RX REV CODE 250: Performed by: SURGERY

## 2023-07-26 PROCEDURE — 700105 HCHG RX REV CODE 258: Mod: JZ | Performed by: SURGERY

## 2023-07-26 PROCEDURE — 8E0W4CZ ROBOTIC ASSISTED PROCEDURE OF TRUNK REGION, PERCUTANEOUS ENDOSCOPIC APPROACH: ICD-10-PCS | Performed by: SURGERY

## 2023-07-26 PROCEDURE — 700102 HCHG RX REV CODE 250 W/ 637 OVERRIDE(OP): Performed by: INTERNAL MEDICINE

## 2023-07-26 PROCEDURE — C1894 INTRO/SHEATH, NON-LASER: HCPCS | Performed by: SURGERY

## 2023-07-26 PROCEDURE — 88307 TISSUE EXAM BY PATHOLOGIST: CPT

## 2023-07-26 PROCEDURE — 700111 HCHG RX REV CODE 636 W/ 250 OVERRIDE (IP): Mod: JZ | Performed by: SURGERY

## 2023-07-26 PROCEDURE — 3E0T3BZ INTRODUCTION OF ANESTHETIC AGENT INTO PERIPHERAL NERVES AND PLEXI, PERCUTANEOUS APPROACH: ICD-10-PCS | Performed by: INTERNAL MEDICINE

## 2023-07-26 PROCEDURE — 0DTN4ZZ RESECTION OF SIGMOID COLON, PERCUTANEOUS ENDOSCOPIC APPROACH: ICD-10-PCS | Performed by: SURGERY

## 2023-07-26 PROCEDURE — A9270 NON-COVERED ITEM OR SERVICE: HCPCS | Performed by: INTERNAL MEDICINE

## 2023-07-26 PROCEDURE — 160035 HCHG PACU - 1ST 60 MINS PHASE I: Performed by: SURGERY

## 2023-07-26 PROCEDURE — 88304 TISSUE EXAM BY PATHOLOGIST: CPT

## 2023-07-26 PROCEDURE — 160009 HCHG ANES TIME/MIN: Performed by: SURGERY

## 2023-07-26 PROCEDURE — 00860 ANES XTRPRTL PX LWR ABD NOS: CPT | Performed by: INTERNAL MEDICINE

## 2023-07-26 PROCEDURE — A9270 NON-COVERED ITEM OR SERVICE: HCPCS | Performed by: SURGERY

## 2023-07-26 PROCEDURE — 502714 HCHG ROBOTIC SURGERY SERVICES: Performed by: SURGERY

## 2023-07-26 PROCEDURE — 700111 HCHG RX REV CODE 636 W/ 250 OVERRIDE (IP): Mod: JZ | Performed by: INTERNAL MEDICINE

## 2023-07-26 PROCEDURE — 64486 TAP BLOCK UNIL BY INJECTION: CPT | Mod: 50,59 | Performed by: INTERNAL MEDICINE

## 2023-07-26 PROCEDURE — 700102 HCHG RX REV CODE 250 W/ 637 OVERRIDE(OP): Performed by: SURGERY

## 2023-07-26 PROCEDURE — 160031 HCHG SURGERY MINUTES - 1ST 30 MINS LEVEL 5: Performed by: SURGERY

## 2023-07-26 PROCEDURE — 770001 HCHG ROOM/CARE - MED/SURG/GYN PRIV*

## 2023-07-26 PROCEDURE — 700101 HCHG RX REV CODE 250: Performed by: INTERNAL MEDICINE

## 2023-07-26 PROCEDURE — 160048 HCHG OR STATISTICAL LEVEL 1-5: Performed by: SURGERY

## 2023-07-26 PROCEDURE — 160042 HCHG SURGERY MINUTES - EA ADDL 1 MIN LEVEL 5: Performed by: SURGERY

## 2023-07-26 PROCEDURE — 64488 TAP BLOCK BI INJECTION: CPT | Performed by: SURGERY

## 2023-07-26 PROCEDURE — 160002 HCHG RECOVERY MINUTES (STAT): Performed by: SURGERY

## 2023-07-26 RX ORDER — DEXTROSE MONOHYDRATE 25 G/50ML
25 INJECTION, SOLUTION INTRAVENOUS
Status: DISCONTINUED | OUTPATIENT
Start: 2023-07-26 | End: 2023-07-27

## 2023-07-26 RX ORDER — CELECOXIB 200 MG/1
400 CAPSULE ORAL ONCE
Status: COMPLETED | OUTPATIENT
Start: 2023-07-26 | End: 2023-07-26

## 2023-07-26 RX ORDER — HYDROMORPHONE HYDROCHLORIDE 1 MG/ML
0.2 INJECTION, SOLUTION INTRAMUSCULAR; INTRAVENOUS; SUBCUTANEOUS
Status: DISCONTINUED | OUTPATIENT
Start: 2023-07-26 | End: 2023-07-26 | Stop reason: HOSPADM

## 2023-07-26 RX ORDER — ONDANSETRON 4 MG/1
4 TABLET, ORALLY DISINTEGRATING ORAL EVERY 4 HOURS PRN
Status: DISCONTINUED | OUTPATIENT
Start: 2023-07-26 | End: 2023-07-28 | Stop reason: HOSPADM

## 2023-07-26 RX ORDER — ALBUTEROL SULFATE 90 UG/1
2 AEROSOL, METERED RESPIRATORY (INHALATION)
Status: DISCONTINUED | OUTPATIENT
Start: 2023-07-26 | End: 2023-07-28 | Stop reason: HOSPADM

## 2023-07-26 RX ORDER — MEPERIDINE HYDROCHLORIDE 25 MG/ML
12.5 INJECTION INTRAMUSCULAR; INTRAVENOUS; SUBCUTANEOUS
Status: DISCONTINUED | OUTPATIENT
Start: 2023-07-26 | End: 2023-07-26 | Stop reason: HOSPADM

## 2023-07-26 RX ORDER — OXYCODONE HCL 5 MG/5 ML
10 SOLUTION, ORAL ORAL
Status: COMPLETED | OUTPATIENT
Start: 2023-07-26 | End: 2023-07-26

## 2023-07-26 RX ORDER — LABETALOL HYDROCHLORIDE 5 MG/ML
5 INJECTION, SOLUTION INTRAVENOUS
Status: DISCONTINUED | OUTPATIENT
Start: 2023-07-26 | End: 2023-07-26 | Stop reason: HOSPADM

## 2023-07-26 RX ORDER — ONDANSETRON 2 MG/ML
4 INJECTION INTRAMUSCULAR; INTRAVENOUS
Status: COMPLETED | OUTPATIENT
Start: 2023-07-26 | End: 2023-07-26

## 2023-07-26 RX ORDER — KETOROLAC TROMETHAMINE 30 MG/ML
30 INJECTION, SOLUTION INTRAMUSCULAR; INTRAVENOUS EVERY 6 HOURS
Status: DISCONTINUED | OUTPATIENT
Start: 2023-07-26 | End: 2023-07-28 | Stop reason: HOSPADM

## 2023-07-26 RX ORDER — DEXAMETHASONE SODIUM PHOSPHATE 4 MG/ML
INJECTION, SOLUTION INTRA-ARTICULAR; INTRALESIONAL; INTRAMUSCULAR; INTRAVENOUS; SOFT TISSUE PRN
Status: DISCONTINUED | OUTPATIENT
Start: 2023-07-26 | End: 2023-07-26 | Stop reason: SURG

## 2023-07-26 RX ORDER — OXYCODONE HYDROCHLORIDE 5 MG/1
5 TABLET ORAL
Status: DISCONTINUED | OUTPATIENT
Start: 2023-07-26 | End: 2023-07-28 | Stop reason: HOSPADM

## 2023-07-26 RX ORDER — BUPIVACAINE HYDROCHLORIDE AND EPINEPHRINE 5; 5 MG/ML; UG/ML
INJECTION, SOLUTION EPIDURAL; INTRACAUDAL; PERINEURAL
Status: DISCONTINUED | OUTPATIENT
Start: 2023-07-26 | End: 2023-07-26 | Stop reason: HOSPADM

## 2023-07-26 RX ORDER — ROCURONIUM BROMIDE 10 MG/ML
INJECTION, SOLUTION INTRAVENOUS PRN
Status: DISCONTINUED | OUTPATIENT
Start: 2023-07-26 | End: 2023-07-26 | Stop reason: SURG

## 2023-07-26 RX ORDER — HYDROMORPHONE HYDROCHLORIDE 1 MG/ML
0.5 INJECTION, SOLUTION INTRAMUSCULAR; INTRAVENOUS; SUBCUTANEOUS
Status: DISCONTINUED | OUTPATIENT
Start: 2023-07-26 | End: 2023-07-28 | Stop reason: HOSPADM

## 2023-07-26 RX ORDER — SODIUM CHLORIDE, SODIUM LACTATE, POTASSIUM CHLORIDE, CALCIUM CHLORIDE 600; 310; 30; 20 MG/100ML; MG/100ML; MG/100ML; MG/100ML
INJECTION, SOLUTION INTRAVENOUS EVERY 6 HOURS
Status: ACTIVE | OUTPATIENT
Start: 2023-07-26 | End: 2023-07-26

## 2023-07-26 RX ORDER — HYDROMORPHONE HYDROCHLORIDE 1 MG/ML
0.4 INJECTION, SOLUTION INTRAMUSCULAR; INTRAVENOUS; SUBCUTANEOUS
Status: DISCONTINUED | OUTPATIENT
Start: 2023-07-26 | End: 2023-07-26 | Stop reason: HOSPADM

## 2023-07-26 RX ORDER — SODIUM CHLORIDE, SODIUM LACTATE, POTASSIUM CHLORIDE, CALCIUM CHLORIDE 600; 310; 30; 20 MG/100ML; MG/100ML; MG/100ML; MG/100ML
INJECTION, SOLUTION INTRAVENOUS CONTINUOUS
Status: DISCONTINUED | OUTPATIENT
Start: 2023-07-26 | End: 2023-07-27

## 2023-07-26 RX ORDER — OXYCODONE HYDROCHLORIDE 10 MG/1
10 TABLET ORAL
Status: DISCONTINUED | OUTPATIENT
Start: 2023-07-26 | End: 2023-07-28 | Stop reason: HOSPADM

## 2023-07-26 RX ORDER — HYDRALAZINE HYDROCHLORIDE 20 MG/ML
5 INJECTION INTRAMUSCULAR; INTRAVENOUS
Status: DISCONTINUED | OUTPATIENT
Start: 2023-07-26 | End: 2023-07-26 | Stop reason: HOSPADM

## 2023-07-26 RX ORDER — HYDROMORPHONE HYDROCHLORIDE 1 MG/ML
0.1 INJECTION, SOLUTION INTRAMUSCULAR; INTRAVENOUS; SUBCUTANEOUS
Status: DISCONTINUED | OUTPATIENT
Start: 2023-07-26 | End: 2023-07-26 | Stop reason: HOSPADM

## 2023-07-26 RX ORDER — ONDANSETRON 2 MG/ML
4 INJECTION INTRAMUSCULAR; INTRAVENOUS EVERY 4 HOURS PRN
Status: DISCONTINUED | OUTPATIENT
Start: 2023-07-26 | End: 2023-07-28 | Stop reason: HOSPADM

## 2023-07-26 RX ORDER — BUPIVACAINE HYDROCHLORIDE 2.5 MG/ML
INJECTION, SOLUTION EPIDURAL; INFILTRATION; INTRACAUDAL
Status: DISCONTINUED | OUTPATIENT
Start: 2023-07-26 | End: 2023-07-26 | Stop reason: SURG

## 2023-07-26 RX ORDER — ACETAMINOPHEN 500 MG
1000 TABLET ORAL EVERY 6 HOURS PRN
Status: DISCONTINUED | OUTPATIENT
Start: 2023-07-31 | End: 2023-07-28 | Stop reason: HOSPADM

## 2023-07-26 RX ORDER — ACETAMINOPHEN 500 MG
1000 TABLET ORAL ONCE
Status: COMPLETED | OUTPATIENT
Start: 2023-07-26 | End: 2023-07-26

## 2023-07-26 RX ORDER — HYDROMORPHONE HYDROCHLORIDE 2 MG/ML
INJECTION, SOLUTION INTRAMUSCULAR; INTRAVENOUS; SUBCUTANEOUS PRN
Status: DISCONTINUED | OUTPATIENT
Start: 2023-07-26 | End: 2023-07-26 | Stop reason: SURG

## 2023-07-26 RX ORDER — IBUPROFEN 200 MG
800 TABLET ORAL 3 TIMES DAILY PRN
Status: DISCONTINUED | OUTPATIENT
Start: 2023-07-29 | End: 2023-07-28 | Stop reason: HOSPADM

## 2023-07-26 RX ORDER — ALLOPURINOL 100 MG/1
150 TABLET ORAL DAILY
COMMUNITY
End: 2023-08-08

## 2023-07-26 RX ORDER — ACETAMINOPHEN 500 MG
1000 TABLET ORAL EVERY 6 HOURS
Status: DISCONTINUED | OUTPATIENT
Start: 2023-07-26 | End: 2023-07-28 | Stop reason: HOSPADM

## 2023-07-26 RX ORDER — MIDAZOLAM HYDROCHLORIDE 1 MG/ML
INJECTION INTRAMUSCULAR; INTRAVENOUS PRN
Status: DISCONTINUED | OUTPATIENT
Start: 2023-07-26 | End: 2023-07-26 | Stop reason: SURG

## 2023-07-26 RX ORDER — DIPHENHYDRAMINE HYDROCHLORIDE 50 MG/ML
12.5 INJECTION INTRAMUSCULAR; INTRAVENOUS
Status: DISCONTINUED | OUTPATIENT
Start: 2023-07-26 | End: 2023-07-26 | Stop reason: HOSPADM

## 2023-07-26 RX ORDER — HALOPERIDOL 5 MG/ML
1 INJECTION INTRAMUSCULAR
Status: DISCONTINUED | OUTPATIENT
Start: 2023-07-26 | End: 2023-07-26 | Stop reason: HOSPADM

## 2023-07-26 RX ORDER — CEFOTETAN DISODIUM 2 G/20ML
INJECTION, POWDER, FOR SOLUTION INTRAMUSCULAR; INTRAVENOUS PRN
Status: DISCONTINUED | OUTPATIENT
Start: 2023-07-26 | End: 2023-07-26 | Stop reason: SURG

## 2023-07-26 RX ORDER — OXYCODONE HCL 5 MG/5 ML
5 SOLUTION, ORAL ORAL
Status: COMPLETED | OUTPATIENT
Start: 2023-07-26 | End: 2023-07-26

## 2023-07-26 RX ORDER — KETOROLAC TROMETHAMINE 30 MG/ML
INJECTION, SOLUTION INTRAMUSCULAR; INTRAVENOUS PRN
Status: DISCONTINUED | OUTPATIENT
Start: 2023-07-26 | End: 2023-07-26 | Stop reason: SURG

## 2023-07-26 RX ORDER — ONDANSETRON 2 MG/ML
INJECTION INTRAMUSCULAR; INTRAVENOUS PRN
Status: DISCONTINUED | OUTPATIENT
Start: 2023-07-26 | End: 2023-07-26 | Stop reason: SURG

## 2023-07-26 RX ORDER — SODIUM CHLORIDE, SODIUM LACTATE, POTASSIUM CHLORIDE, CALCIUM CHLORIDE 600; 310; 30; 20 MG/100ML; MG/100ML; MG/100ML; MG/100ML
INJECTION, SOLUTION INTRAVENOUS CONTINUOUS
Status: DISCONTINUED | OUTPATIENT
Start: 2023-07-26 | End: 2023-07-26

## 2023-07-26 RX ADMIN — BUPIVACAINE HYDROCHLORIDE 50 ML: 2.5 INJECTION, SOLUTION EPIDURAL; INFILTRATION; INTRACAUDAL at 11:07

## 2023-07-26 RX ADMIN — KETOROLAC TROMETHAMINE 30 MG: 30 INJECTION, SOLUTION INTRAMUSCULAR; INTRAVENOUS at 22:05

## 2023-07-26 RX ADMIN — ACETAMINOPHEN 1000 MG: 500 TABLET, FILM COATED ORAL at 22:06

## 2023-07-26 RX ADMIN — HYDROMORPHONE HYDROCHLORIDE 0.5 MG: 2 INJECTION INTRAMUSCULAR; INTRAVENOUS; SUBCUTANEOUS at 08:28

## 2023-07-26 RX ADMIN — CEFOTETAN DISODIUM 2 G: 2 INJECTION, POWDER, FOR SOLUTION INTRAMUSCULAR; INTRAVENOUS at 08:20

## 2023-07-26 RX ADMIN — OXYCODONE HYDROCHLORIDE 10 MG: 10 TABLET ORAL at 16:59

## 2023-07-26 RX ADMIN — SODIUM CHLORIDE, POTASSIUM CHLORIDE, SODIUM LACTATE AND CALCIUM CHLORIDE: 600; 310; 30; 20 INJECTION, SOLUTION INTRAVENOUS at 13:57

## 2023-07-26 RX ADMIN — Medication 12.5 MG: at 08:41

## 2023-07-26 RX ADMIN — ROCURONIUM BROMIDE 20 MG: 50 INJECTION, SOLUTION INTRAVENOUS at 10:39

## 2023-07-26 RX ADMIN — Medication 25 MG: at 08:15

## 2023-07-26 RX ADMIN — FENTANYL CITRATE 100 MCG: 50 INJECTION, SOLUTION INTRAMUSCULAR; INTRAVENOUS at 08:08

## 2023-07-26 RX ADMIN — ROCURONIUM BROMIDE 10 MG: 50 INJECTION, SOLUTION INTRAVENOUS at 10:04

## 2023-07-26 RX ADMIN — DEXAMETHASONE SODIUM PHOSPHATE 8 MG: 4 INJECTION INTRA-ARTICULAR; INTRALESIONAL; INTRAMUSCULAR; INTRAVENOUS; SOFT TISSUE at 08:10

## 2023-07-26 RX ADMIN — KETOROLAC TROMETHAMINE 30 MG: 30 INJECTION, SOLUTION INTRAMUSCULAR; INTRAVENOUS at 11:03

## 2023-07-26 RX ADMIN — HYDROMORPHONE HYDROCHLORIDE 0.5 MG: 2 INJECTION INTRAMUSCULAR; INTRAVENOUS; SUBCUTANEOUS at 10:43

## 2023-07-26 RX ADMIN — PROPOFOL 200 MG: 10 INJECTION, EMULSION INTRAVENOUS at 08:08

## 2023-07-26 RX ADMIN — ACETAMINOPHEN 1000 MG: 500 TABLET, FILM COATED ORAL at 07:44

## 2023-07-26 RX ADMIN — OXYCODONE HYDROCHLORIDE 10 MG: 5 SOLUTION ORAL at 11:23

## 2023-07-26 RX ADMIN — ACETAMINOPHEN 1000 MG: 500 TABLET, FILM COATED ORAL at 14:00

## 2023-07-26 RX ADMIN — INSULIN HUMAN 1 UNITS: 100 INJECTION, SOLUTION PARENTERAL at 17:02

## 2023-07-26 RX ADMIN — ROCURONIUM BROMIDE 20 MG: 50 INJECTION, SOLUTION INTRAVENOUS at 08:45

## 2023-07-26 RX ADMIN — CELECOXIB 400 MG: 200 CAPSULE ORAL at 07:45

## 2023-07-26 RX ADMIN — FENTANYL CITRATE 50 MCG: 50 INJECTION, SOLUTION INTRAMUSCULAR; INTRAVENOUS at 12:15

## 2023-07-26 RX ADMIN — MIDAZOLAM 2 MG: 1 INJECTION, SOLUTION INTRAMUSCULAR; INTRAVENOUS at 08:00

## 2023-07-26 RX ADMIN — HALOPERIDOL LACTATE 1 MG: 5 INJECTION, SOLUTION INTRAMUSCULAR at 11:57

## 2023-07-26 RX ADMIN — HYDROMORPHONE HYDROCHLORIDE 0.5 MG: 2 INJECTION INTRAMUSCULAR; INTRAVENOUS; SUBCUTANEOUS at 09:18

## 2023-07-26 RX ADMIN — SODIUM CHLORIDE, POTASSIUM CHLORIDE, SODIUM LACTATE AND CALCIUM CHLORIDE: 600; 310; 30; 20 INJECTION, SOLUTION INTRAVENOUS at 08:00

## 2023-07-26 RX ADMIN — ROCURONIUM BROMIDE 20 MG: 50 INJECTION, SOLUTION INTRAVENOUS at 09:18

## 2023-07-26 RX ADMIN — ROCURONIUM BROMIDE 50 MG: 50 INJECTION, SOLUTION INTRAVENOUS at 08:08

## 2023-07-26 RX ADMIN — KETOROLAC TROMETHAMINE 30 MG: 30 INJECTION, SOLUTION INTRAMUSCULAR; INTRAVENOUS at 13:58

## 2023-07-26 RX ADMIN — ONDANSETRON 4 MG: 2 INJECTION INTRAMUSCULAR; INTRAVENOUS at 11:28

## 2023-07-26 RX ADMIN — FENTANYL CITRATE 50 MCG: 50 INJECTION, SOLUTION INTRAMUSCULAR; INTRAVENOUS at 11:23

## 2023-07-26 RX ADMIN — SUGAMMADEX 200 MG: 100 INJECTION, SOLUTION INTRAVENOUS at 11:09

## 2023-07-26 RX ADMIN — OXYCODONE HYDROCHLORIDE 10 MG: 10 TABLET ORAL at 22:05

## 2023-07-26 RX ADMIN — Medication 12.5 MG: at 09:53

## 2023-07-26 RX ADMIN — ONDANSETRON 4 MG: 2 INJECTION INTRAMUSCULAR; INTRAVENOUS at 22:05

## 2023-07-26 RX ADMIN — ONDANSETRON 4 MG: 2 INJECTION INTRAMUSCULAR; INTRAVENOUS at 10:58

## 2023-07-26 ASSESSMENT — COGNITIVE AND FUNCTIONAL STATUS - GENERAL
SUGGESTED CMS G CODE MODIFIER DAILY ACTIVITY: CH
MOBILITY SCORE: 24
SUGGESTED CMS G CODE MODIFIER MOBILITY: CH
DAILY ACTIVITIY SCORE: 24

## 2023-07-26 ASSESSMENT — LIFESTYLE VARIABLES
HAVE PEOPLE ANNOYED YOU BY CRITICIZING YOUR DRINKING: NO
EVER HAD A DRINK FIRST THING IN THE MORNING TO STEADY YOUR NERVES TO GET RID OF A HANGOVER: NO
DOES PATIENT WANT TO STOP DRINKING: NO
TOTAL SCORE: 0
EVER FELT BAD OR GUILTY ABOUT YOUR DRINKING: NO
AVERAGE NUMBER OF DAYS PER WEEK YOU HAVE A DRINK CONTAINING ALCOHOL: 5
HAVE YOU EVER FELT YOU SHOULD CUT DOWN ON YOUR DRINKING: NO
TOTAL SCORE: 0
ALCOHOL_USE: YES
HOW MANY TIMES IN THE PAST YEAR HAVE YOU HAD 5 OR MORE DRINKS IN A DAY: 0
ON A TYPICAL DAY WHEN YOU DRINK ALCOHOL HOW MANY DRINKS DO YOU HAVE: 2
TOTAL SCORE: 0
CONSUMPTION TOTAL: NEGATIVE

## 2023-07-26 ASSESSMENT — PAIN DESCRIPTION - PAIN TYPE
TYPE: SURGICAL PAIN
TYPE: ACUTE PAIN;SURGICAL PAIN
TYPE: ACUTE PAIN
TYPE: SURGICAL PAIN

## 2023-07-26 ASSESSMENT — COPD QUESTIONNAIRES
DURING THE PAST 4 WEEKS HOW MUCH DID YOU FEEL SHORT OF BREATH: NONE/LITTLE OF THE TIME
DO YOU EVER COUGH UP ANY MUCUS OR PHLEGM?: NO/ONLY WITH OCCASIONAL COLDS OR INFECTIONS
COPD SCREENING SCORE: 2
HAVE YOU SMOKED AT LEAST 100 CIGARETTES IN YOUR ENTIRE LIFE: YES

## 2023-07-26 ASSESSMENT — FIBROSIS 4 INDEX: FIB4 SCORE: 1.6

## 2023-07-26 NOTE — PROGRESS NOTES
4 Eyes Skin Assessment Completed by MILTON Sol and MILTON Hernandez.    Head WDL  Ears WDL  Nose WDL  Mouth WDL  Neck WDL  Breast/Chest WDL  Shoulder Blades WDL  Spine WDL  (R) Arm/Elbow/Hand WDL  (L) Arm/Elbow/Hand WDL  Abdomen Incision  Groin WDL  Scrotum/Coccyx/Buttocks WDL  (R) Leg WDL  (L) Leg WDL  (R) Heel/Foot/Toe WDL  (L) Heel/Foot/Toe WDL          Devices In Places Nasal Cannula      Interventions In Place NC W/Ear Foams    Possible Skin Injury No    Pictures Uploaded Into Epic N/A  Wound Consult Placed N/A  RN Wound Prevention Protocol Ordered No

## 2023-07-26 NOTE — ANESTHESIA POSTPROCEDURE EVALUATION
Patient: River Olson    Procedure Summary     Date: 07/26/23 Room / Location: [place] [id number] / SURGERY [facility]    Anesthesia Start: 0800 Anesthesia Stop: 1118    Procedure: RESECTION, LOW ANTERIOR, ROBOT-ASSISTED, LAPAROSCOPIC, USING DA GAVIN XI (Abdomen) Diagnosis: (complicated diverticulitis)    Surgeons: Cipriano Arteaga M.D. Responsible Provider: Corey Hamm M.D.    Anesthesia Type: general, peripheral nerve block ASA Status: 2          Final Anesthesia Type: general, peripheral nerve block  Last vitals  BP   Blood Pressure: (!) 143/82    Temp   36.3 °C (97.3 °F)    Pulse   87   Resp   20    SpO2   95 %      Anesthesia Post Evaluation    Patient location during evaluation: PACU  Patient participation: complete - patient participated  Level of consciousness: awake and alert    Airway patency: patent  Anesthetic complications: no  Cardiovascular status: hemodynamically stable  Respiratory status: acceptable  Hydration status: euvolemic    PONV: none          No notable events documented.     Nurse Pain Score: 4 (NPRS)

## 2023-07-26 NOTE — OP REPORT
Surgeon: Cipriano Arteaga MD  Assist: RADHA Person  Preoperative diagnosis: Complicated diverticulitis  Postoperative diagnosis: Complicated diverticulitis with residual abscess  Procedure: Da Sara assisted sigmoid colon resection with anastomosis  Anesthesia: General endotracheal anesthesia  Anesthesiologist: Corey Hamm MD  Indications: 42-year-old man with complicated diverticulitis.  Had initial admission with IR drainage of a relatively large diverticular abscess.  A sigmoid colon resection is now indicated.  Narrative: The procedure was discussed in detail with the patient including the risks of bleeding, infection, abscess, and hematoma injury to retroperitoneal organs such as the ureter and injury to the organs such as small bowel.  Also discussed the risk of anastomotic leak anastomotic stricture and the potential for chronic colostomy.  Also discussed the use of the da Sara machine and the potential for converting to an open procedure.  Also discussed the risk of in these circumstances.  He understood all the above and wished proceed.  He was placed under anesthesia by Dr. Hamm.  His abdomen is prepped and draped with chlorhexidine prep and sterile drapes.  After timeout a right upper quad incision was made and through this incision the Veress needle was placed.  Low pressure was confirmed and CO2 insufflation was used to achieve pneumoperitoneum 50 mils of mercury.  The same incision 8 mm da Sara port was placed.  Under direct visitation a near subxiphoid port, a mid right abdominal and a right lower quadrant da Sara ports were placed.  An assist port was placed lateral to these ports on the right side.  Robot was docked and instruments were placed.  The splenic flexure was mobilized and the descending colon was mobilized by releasing the lateral peritoneal attachments.  A significant area of colonic inflammation was encountered.  Dissection proceeded.  During this dissection a residual  abscess was encountered and drained completely.  Mobilization continued.  The distal sigmoid was adherent to the pelvic inlet.  This was mobilized.  The ureters were protected during this maneuver.  Eventually the colon was mobilized and dissection proceeded below to normal rectum.  The mesentery of the proximal rectum was divided.  A green staple load was then fired across the proximal rectum.  The proximal mesentery to the abnormal colon was then taken down.  The sigmoid was well mobilized.  An incision was then made in the left lower quadrant.  This was used to extract the detached colon.  Pneumoperitoneum was released and the robot was undocked during these maneuvers.  The colon was divided proximal to the area of inflammation and in an area that was free of diverticuli.  The specimen was handed off labeled as sigmoid colon.  A 2-0 Prolene pursestring was placed in the descending colon.  The 29 anvil for the EEA was placed.  This was then dropped back into the peritoneal cavity.  The wound protector was used to maintain a pneumoperitoneum and this was reestablished.  The robot was then docked.  Attention was placed to assure hemostasis.  The length was noted to be excellent.  The EEA stapler was passed from below and a stapled anastomosis was created.  This was noted to be tension free and well perfused.  Given the abscess that was present I did elect to place a drain.  This was placed through the assist port and positioned in the pelvis.  Robot was undocked and ports removed.  The wound protector was removed.  The fascia at the extraction site was approximated with interrupted #1 Vicryl sutures.  The skin and all incisions was approximated with staples.  Sterile dressings were placed.  The patient taught procedure well without apparent complication.  Final counts reported as correct.  Wound class was class III.    The first assist, RADHA Person, was required for this operation due to the complexity of  the case.  Guerita assisted with port placement and docking of the robot.  Guerita also assisted with placing sutures and instruments into the patient at the bedside while I was at the robotic console.  She remained at the bedside scrubbed and while I was at the console.  Guerita also assisted in undocking and closing of incisions.

## 2023-07-26 NOTE — OR NURSING
1115: Pt arrived from OR, handoff received from anesthesiologist and RN. Patient waking up, moving all extremities. Five sites to abdomen, dressing C/D/I. HEATHER drain to RLQ, with serosanguineous output. Vitals stable. Blood glucose 155.     1120: Medicated for pain per MAR.     1130: ERAS protocol, placed on 10L via oxy mask. Medicated for nausea.     1145: Continuing to medicate for nausea. Pain improved per patient. Attempted to update daughter via telephone, no answer.     1200: Daughter updated via  staff. Patient tolerating water and juice. One belongings bag retrieved from locker.     1230: Report given to T4 RNEbenezer.

## 2023-07-26 NOTE — ANESTHESIA TIME REPORT
Anesthesia Start and Stop Event Times     Date Time Event    7/26/2023 0750 Ready for Procedure     0800 Anesthesia Start     1118 Anesthesia Stop        Responsible Staff  07/26/23    Name Role Begin End    Corey Hamm M.D. Anesth 0800 1118        Overtime Reason:  no overtime (within assigned shift)    Comments:

## 2023-07-26 NOTE — ANESTHESIA PREPROCEDURE EVALUATION
Case: [id number] Date/Time: 07/26/23 0745    Procedure: ROBOTIC COLECTOMY, PARTIAL SIGMOID    Pre-op diagnosis: K57.20    Location: [place] [address] / SURGERY [facility]    Surgeons: Cipriano Arteaga M.D.      Hx of HTN, gout, thalassemia minor, and complicated doverticulitis who presents for a robotic partial colectomy.     Relevant Problems   CARDIAC   (positive) Essential hypertension, benign      Other   (positive) Splenomegaly       Physical Exam    Airway   Mallampati: II  TM distance: >3 FB  Neck ROM: full       Cardiovascular - normal exam  Rhythm: regular  Rate: normal  (-) murmur     Dental - normal exam           Pulmonary - normal exam  Breath sounds clear to auscultation     Abdominal    Neurological - normal exam                 Anesthesia Plan    ASA 2       Plan - general and peripheral nerve block     Peripheral nerve block will be post-op pain control  Airway plan will be ETT          Induction: intravenous    Postoperative Plan: Postoperative administration of opioids is intended.    Pertinent diagnostic labs and testing reviewed    Informed Consent:    Anesthetic plan and risks discussed with patient.    Use of blood products discussed with: patient whom consented to blood products.

## 2023-07-27 LAB
ANION GAP SERPL CALC-SCNC: 12 MMOL/L (ref 7–16)
BASOPHILS # BLD AUTO: 0.3 % (ref 0–1.8)
BASOPHILS # BLD: 0.03 K/UL (ref 0–0.12)
BUN SERPL-MCNC: 11 MG/DL (ref 8–22)
CALCIUM SERPL-MCNC: 8.6 MG/DL (ref 8.5–10.5)
CHLORIDE SERPL-SCNC: 98 MMOL/L (ref 96–112)
CO2 SERPL-SCNC: 26 MMOL/L (ref 20–33)
CREAT SERPL-MCNC: 0.58 MG/DL (ref 0.5–1.4)
EOSINOPHIL # BLD AUTO: 0 K/UL (ref 0–0.51)
EOSINOPHIL NFR BLD: 0 % (ref 0–6.9)
ERYTHROCYTE [DISTWIDTH] IN BLOOD BY AUTOMATED COUNT: 47.9 FL (ref 35.9–50)
GFR SERPLBLD CREATININE-BSD FMLA CKD-EPI: 123 ML/MIN/1.73 M 2
GLUCOSE BLD STRIP.AUTO-MCNC: 100 MG/DL (ref 65–99)
GLUCOSE BLD STRIP.AUTO-MCNC: 114 MG/DL (ref 65–99)
GLUCOSE BLD STRIP.AUTO-MCNC: 98 MG/DL (ref 65–99)
GLUCOSE SERPL-MCNC: 92 MG/DL (ref 65–99)
HCT VFR BLD AUTO: 32.4 % (ref 42–52)
HGB BLD-MCNC: 9.9 G/DL (ref 14–18)
IMM GRANULOCYTES # BLD AUTO: 0.06 K/UL (ref 0–0.11)
IMM GRANULOCYTES NFR BLD AUTO: 0.6 % (ref 0–0.9)
LYMPHOCYTES # BLD AUTO: 1.47 K/UL (ref 1–4.8)
LYMPHOCYTES NFR BLD: 15.4 % (ref 22–41)
MCH RBC QN AUTO: 21.4 PG (ref 27–33)
MCHC RBC AUTO-ENTMCNC: 30.6 G/DL (ref 32.3–36.5)
MCV RBC AUTO: 70 FL (ref 81.4–97.8)
MONOCYTES # BLD AUTO: 0.81 K/UL (ref 0–0.85)
MONOCYTES NFR BLD AUTO: 8.5 % (ref 0–13.4)
NEUTROPHILS # BLD AUTO: 7.16 K/UL (ref 1.82–7.42)
NEUTROPHILS NFR BLD: 75.2 % (ref 44–72)
NRBC # BLD AUTO: 0.04 K/UL
NRBC BLD-RTO: 0.4 /100 WBC (ref 0–0.2)
PLATELET # BLD AUTO: 221 K/UL (ref 164–446)
POTASSIUM SERPL-SCNC: 4.5 MMOL/L (ref 3.6–5.5)
RBC # BLD AUTO: 4.63 M/UL (ref 4.7–6.1)
SODIUM SERPL-SCNC: 136 MMOL/L (ref 135–145)
WBC # BLD AUTO: 9.5 K/UL (ref 4.8–10.8)

## 2023-07-27 PROCEDURE — 82962 GLUCOSE BLOOD TEST: CPT

## 2023-07-27 PROCEDURE — 700102 HCHG RX REV CODE 250 W/ 637 OVERRIDE(OP): Performed by: SURGERY

## 2023-07-27 PROCEDURE — 80048 BASIC METABOLIC PNL TOTAL CA: CPT

## 2023-07-27 PROCEDURE — 85025 COMPLETE CBC W/AUTO DIFF WBC: CPT

## 2023-07-27 PROCEDURE — 700111 HCHG RX REV CODE 636 W/ 250 OVERRIDE (IP): Mod: JZ | Performed by: SURGERY

## 2023-07-27 PROCEDURE — A9270 NON-COVERED ITEM OR SERVICE: HCPCS | Performed by: SURGERY

## 2023-07-27 PROCEDURE — 770001 HCHG ROOM/CARE - MED/SURG/GYN PRIV*

## 2023-07-27 PROCEDURE — 36415 COLL VENOUS BLD VENIPUNCTURE: CPT

## 2023-07-27 RX ORDER — QUETIAPINE FUMARATE 100 MG/1
100 TABLET, FILM COATED ORAL NIGHTLY PRN
Status: DISCONTINUED | OUTPATIENT
Start: 2023-07-27 | End: 2023-07-28 | Stop reason: HOSPADM

## 2023-07-27 RX ADMIN — ONDANSETRON 4 MG: 2 INJECTION INTRAMUSCULAR; INTRAVENOUS at 05:13

## 2023-07-27 RX ADMIN — QUETIAPINE FUMARATE 100 MG: 100 TABLET ORAL at 22:35

## 2023-07-27 RX ADMIN — OXYCODONE HYDROCHLORIDE 10 MG: 10 TABLET ORAL at 05:12

## 2023-07-27 RX ADMIN — ACETAMINOPHEN 1000 MG: 500 TABLET, FILM COATED ORAL at 05:12

## 2023-07-27 RX ADMIN — KETOROLAC TROMETHAMINE 30 MG: 30 INJECTION, SOLUTION INTRAMUSCULAR; INTRAVENOUS at 11:34

## 2023-07-27 RX ADMIN — OXYCODONE HYDROCHLORIDE 5 MG: 5 TABLET ORAL at 14:46

## 2023-07-27 RX ADMIN — KETOROLAC TROMETHAMINE 30 MG: 30 INJECTION, SOLUTION INTRAMUSCULAR; INTRAVENOUS at 18:06

## 2023-07-27 RX ADMIN — ACETAMINOPHEN 1000 MG: 500 TABLET, FILM COATED ORAL at 11:34

## 2023-07-27 RX ADMIN — ACETAMINOPHEN 1000 MG: 500 TABLET, FILM COATED ORAL at 18:06

## 2023-07-27 RX ADMIN — OXYCODONE HYDROCHLORIDE 10 MG: 10 TABLET ORAL at 22:35

## 2023-07-27 RX ADMIN — KETOROLAC TROMETHAMINE 30 MG: 30 INJECTION, SOLUTION INTRAMUSCULAR; INTRAVENOUS at 05:13

## 2023-07-27 ASSESSMENT — PAIN DESCRIPTION - PAIN TYPE
TYPE: ACUTE PAIN;SURGICAL PAIN
TYPE: ACUTE PAIN

## 2023-07-27 NOTE — PROGRESS NOTES
Messaged Dr. Arteaga. Pt takes Seroquel 100 mg at night. It's on pt's med rec however it's not on his MAR. Pt is requesting it tonight.    1535- Per Dr. Chandler burch to order the Seroquel 100 mg for sleep.

## 2023-07-27 NOTE — DISCHARGE PLANNING
HTH/SCP TCN chart review completed. Collaborated with NATALIE Cordon. The most current review of medical record, knowledge of pt's PLOF and social support, LACE+ score of 10, 6 clicks scores of 24 mobility were considered.      Pt seen at bedside. Introduced TCN program. Provided education regarding post acute levels of care. Discussed HTH/SCP plan benefits (Meds to Beds, medical uber and [facility] transitional care). Pt verbalizes understanding.     Pt reports he has just began to ambulate since surgery. However he reports he was able to ambulate within room with no AD and does not believe he will need AD at time of dc. He does endorse increased SOB during ambulation, though note pt is currently on .5L supplemental 02 at 92% and reports that Sp02 only decreased to 87% on RA during initial ambulation with recovery with rest. He is anticipating no functional concerns with dc to home once more medically stable with re: SOB and has increased his OOB activity. He reports no concerns with transportation for dc to home or for outpatient follow ups with surgeon.    Given aformentioned, no additional provider requests and no choice indicated at this time. TCN will continue to follow and collaborate with discharge planning team as additional post acute needs arise. Thank you.     Completed today:  Choice obtained: none; see above  [facility] referral not sent; pt planning on outpatient follow ups with surgeon

## 2023-07-27 NOTE — PROGRESS NOTES
Assumed care of patient at 1845. Bedside report received. Assessment complete.  AA&Ox4. Denies CP/SOB.  Reporting 7/10 pain. Medicated per MAR.   Educated patient regarding pharmacologic and non pharmacologic modalities for pain management.  Skin per flowsheet.  Tolerating full liquid diet.   +N. -V. Medicated per MAR.   + void via parada catheter. - BM. Last BM PTA.  Pt ambulates SBA with the FWW.  All needs met at this time. Call light within reach. Pt calls appropriately. Bed low and locked, non skid socks in place. Hourly rounding in place.

## 2023-07-27 NOTE — PROGRESS NOTES
Postoperative day #1  Some pain but controlled with pain medication  Tolerating a liquid diet  Benign exam  HEATHER with small amount of serosanguineous fluid  Eckert with clear urine  Plan:  DC Eckert  Ambulate  Advance diet as tolerated

## 2023-07-27 NOTE — CARE PLAN
The patient is Watcher - Medium risk of patient condition declining or worsening    Shift Goals  Clinical Goals: pain control, rest, OOB activity, I&Os  Patient Goals: rest, pain control  Family Goals: n/a    Progress made toward(s) clinical / shift goals:  Patient was able to rest minimally overnight. Patient reported pain to be managed with PRN and scheduled medications. Educated on pharmacological modalities. I&Os per flowsheets. Patient was able to ambulate with SBA overnight.     Patient is not progressing towards the following goals:

## 2023-07-28 ENCOUNTER — PHARMACY VISIT (OUTPATIENT)
Dept: PHARMACY | Facility: MEDICAL CENTER | Age: 43
End: 2023-07-28
Payer: COMMERCIAL

## 2023-07-28 VITALS
BODY MASS INDEX: 27.04 KG/M2 | HEART RATE: 87 BPM | TEMPERATURE: 98.8 F | WEIGHT: 193.12 LBS | RESPIRATION RATE: 18 BRPM | OXYGEN SATURATION: 96 % | DIASTOLIC BLOOD PRESSURE: 89 MMHG | SYSTOLIC BLOOD PRESSURE: 139 MMHG | HEIGHT: 71 IN

## 2023-07-28 LAB
ANION GAP SERPL CALC-SCNC: 6 MMOL/L (ref 7–16)
BASOPHILS # BLD AUTO: 0.5 % (ref 0–1.8)
BASOPHILS # BLD: 0.04 K/UL (ref 0–0.12)
BUN SERPL-MCNC: 8 MG/DL (ref 8–22)
CALCIUM SERPL-MCNC: 8.8 MG/DL (ref 8.5–10.5)
CHLORIDE SERPL-SCNC: 100 MMOL/L (ref 96–112)
CO2 SERPL-SCNC: 32 MMOL/L (ref 20–33)
CREAT SERPL-MCNC: 0.66 MG/DL (ref 0.5–1.4)
EOSINOPHIL # BLD AUTO: 0.09 K/UL (ref 0–0.51)
EOSINOPHIL NFR BLD: 1 % (ref 0–6.9)
ERYTHROCYTE [DISTWIDTH] IN BLOOD BY AUTOMATED COUNT: 48.5 FL (ref 35.9–50)
GFR SERPLBLD CREATININE-BSD FMLA CKD-EPI: 119 ML/MIN/1.73 M 2
GLUCOSE SERPL-MCNC: 103 MG/DL (ref 65–99)
HCT VFR BLD AUTO: 33 % (ref 42–52)
HGB BLD-MCNC: 9.9 G/DL (ref 14–18)
IMM GRANULOCYTES # BLD AUTO: 0.04 K/UL (ref 0–0.11)
IMM GRANULOCYTES NFR BLD AUTO: 0.5 % (ref 0–0.9)
LYMPHOCYTES # BLD AUTO: 2.43 K/UL (ref 1–4.8)
LYMPHOCYTES NFR BLD: 27.9 % (ref 22–41)
MCH RBC QN AUTO: 21.2 PG (ref 27–33)
MCHC RBC AUTO-ENTMCNC: 30 G/DL (ref 32.3–36.5)
MCV RBC AUTO: 70.7 FL (ref 81.4–97.8)
MONOCYTES # BLD AUTO: 0.77 K/UL (ref 0–0.85)
MONOCYTES NFR BLD AUTO: 8.8 % (ref 0–13.4)
NEUTROPHILS # BLD AUTO: 5.35 K/UL (ref 1.82–7.42)
NEUTROPHILS NFR BLD: 61.3 % (ref 44–72)
NRBC # BLD AUTO: 0.03 K/UL
NRBC BLD-RTO: 0.3 /100 WBC (ref 0–0.2)
PLATELET # BLD AUTO: 209 K/UL (ref 164–446)
POTASSIUM SERPL-SCNC: 3.9 MMOL/L (ref 3.6–5.5)
RBC # BLD AUTO: 4.67 M/UL (ref 4.7–6.1)
SODIUM SERPL-SCNC: 138 MMOL/L (ref 135–145)
WBC # BLD AUTO: 8.7 K/UL (ref 4.8–10.8)

## 2023-07-28 PROCEDURE — 80048 BASIC METABOLIC PNL TOTAL CA: CPT

## 2023-07-28 PROCEDURE — A9270 NON-COVERED ITEM OR SERVICE: HCPCS | Performed by: SURGERY

## 2023-07-28 PROCEDURE — RXMED WILLOW AMBULATORY MEDICATION CHARGE: Performed by: SURGERY

## 2023-07-28 PROCEDURE — 85025 COMPLETE CBC W/AUTO DIFF WBC: CPT

## 2023-07-28 PROCEDURE — 700111 HCHG RX REV CODE 636 W/ 250 OVERRIDE (IP): Mod: JZ | Performed by: SURGERY

## 2023-07-28 PROCEDURE — 700102 HCHG RX REV CODE 250 W/ 637 OVERRIDE(OP): Performed by: SURGERY

## 2023-07-28 PROCEDURE — 36415 COLL VENOUS BLD VENIPUNCTURE: CPT

## 2023-07-28 PROCEDURE — 94760 N-INVAS EAR/PLS OXIMETRY 1: CPT

## 2023-07-28 RX ORDER — OXYCODONE HYDROCHLORIDE 5 MG/1
5 TABLET ORAL EVERY 4 HOURS PRN
Qty: 24 TABLET | Refills: 0 | Status: SHIPPED | OUTPATIENT
Start: 2023-07-28 | End: 2023-08-02

## 2023-07-28 RX ADMIN — KETOROLAC TROMETHAMINE 30 MG: 30 INJECTION, SOLUTION INTRAMUSCULAR; INTRAVENOUS at 17:27

## 2023-07-28 RX ADMIN — KETOROLAC TROMETHAMINE 30 MG: 30 INJECTION, SOLUTION INTRAMUSCULAR; INTRAVENOUS at 12:07

## 2023-07-28 RX ADMIN — ACETAMINOPHEN 1000 MG: 500 TABLET, FILM COATED ORAL at 04:56

## 2023-07-28 RX ADMIN — ACETAMINOPHEN 1000 MG: 500 TABLET, FILM COATED ORAL at 17:28

## 2023-07-28 RX ADMIN — ACETAMINOPHEN 1000 MG: 500 TABLET, FILM COATED ORAL at 12:06

## 2023-07-28 RX ADMIN — KETOROLAC TROMETHAMINE 30 MG: 30 INJECTION, SOLUTION INTRAMUSCULAR; INTRAVENOUS at 04:56

## 2023-07-28 ASSESSMENT — PAIN DESCRIPTION - PAIN TYPE
TYPE: ACUTE PAIN;SURGICAL PAIN
TYPE: ACUTE PAIN

## 2023-07-28 NOTE — DISCHARGE INSTRUCTIONS
Diet as tolerated.   Shower ok. Do not scrub incisions or soak in bath or hot tub.   Activity as tolerated. No lifting >10lbs, lift using legs, not torso.   Notify MD or visit ED if any signs or symptoms of infection.

## 2023-07-28 NOTE — DISCHARGE PLANNING
HTH/SCP TCN chart review completed. Collaborated with NATALIE Cordon. Current discharge considerations are dc to home with outpatient follow ups if indicated. Note that 6 clicks mobility remains 24 and per review pt is up self as well. TCN will continue to follow and collaborate with discharge planning team as additional post acute needs arise. Thank you.    Completed:  Choice obtained: none; see above  [facility] referral not sent; pt planning on outpatient follow ups with surgeon

## 2023-07-28 NOTE — DISCHARGE SUMMARY
Discharge Summary    CHIEF COMPLAINT ON ADMISSION  Complicated diverticulitis      Reason for Admission  Diverticulitis of large intestine *     Admission Date  7/26/2023    CODE STATUS  Full Code    HPI & HOSPITAL COURSE  This is a 42 y.o. male here with complicated diverticulitis status post period of cooling off.  He was admitted and taken to the OR for a da Sara assisted sigmoid colon resection.  At the time of surgery he was noted to have significant persistent inflammation and an abscess.  He underwent resection and anastomosis without incident.  Postoperatively he is doing well.  He is tolerating p.o.  He has had flatus and a bowel movement.  He has a benign exam.  He is discharged home.  He will resume his preoperative medications and will also have a prescription for oxycodone 5 mg 1-2 p.o. every 4 hours as needed pain.  He is doing well clinically.  Therefore, he is discharged in good and stable condition to home with close outpatient follow-up.    The patient met 2-midnight criteria for an inpatient stay at the time of discharge.    Discharge Date  7/28/2023    FOLLOW UP ITEMS POST DISCHARGE  Follow-up my office in 2 weeks    DISCHARGE DIAGNOSES  Principal Problem:    Diverticulitis (POA: Yes)  Resolved Problems:    * No resolved hospital problems. *      FOLLOW UP  No future appointments.  No follow-up provider specified.    MEDICATIONS ON DISCHARGE     Medication List        START taking these medications        Instructions   oxyCODONE immediate-release 5 MG Tabs  Commonly known as: Roxicodone   Take 1 Tablet by mouth every four hours as needed for Severe Pain for up to 5 days.  Dose: 5 mg            CONTINUE taking these medications        Instructions   acetaminophen 500 MG Tabs  Commonly known as: Tylenol   Take 1-2 Tablets by mouth every 6 hours as needed for Mild Pain or Moderate Pain.  Dose: 500-1,000 mg     albuterol 108 (90 Base) MCG/ACT Aers inhalation aerosol   Inhale 2 Puffs every four  hours as needed for Shortness of Breath.  Dose: 2 Puff     allopurinol 100 MG Tabs  Commonly known as: Zyloprim   Take 150 mg by mouth every day.  Dose: 150 mg     colchicine 0.6 MG Tabs  Commonly known as: Colcrys   Take 0.6 mg by mouth 2 times a day.  Dose: 0.6 mg     felodipine ER 10 MG Tb24  Commonly known as: Plendil   Take 10 mg by mouth every day.  Dose: 10 mg     FLUoxetine 20 MG Caps  Commonly known as: PROzac   Take 40 mg by mouth every day.  Dose: 40 mg     multivitamin Tabs   Take 1 Tablet by mouth every day.  Dose: 1 Tablet     QUEtiapine 100 MG Tabs  Commonly known as: SEROquel   Take 100 mg by mouth at bedtime as needed (for sleep).  Dose: 100 mg     telmisartan 40 MG Tabs  Commonly known as: Micardis   Take 40 mg by mouth every day.  Dose: 40 mg              Allergies  No Known Allergies    DIET  Orders Placed This Encounter   Procedures    Diet Order Diet: Regular     Standing Status:   Standing     Number of Occurrences:   1     Order Specific Question:   Diet:     Answer:   Regular [1]       ACTIVITY  As tolerated.       PROCEDURES  Da Sara assisted sigmoid colon resection

## 2023-07-28 NOTE — CARE PLAN
Problem: Knowledge Deficit - Standard  Goal: Patient and family/care givers will demonstrate understanding of plan of care, disease process/condition, diagnostic tests and medications  Outcome: Progressing     Problem: Fall Risk  Goal: Patient will remain free from falls  Outcome: Progressing     Problem: Pain - Standard  Goal: Alleviation of pain or a reduction in pain to the patient’s comfort goal  Outcome: Progressing   The patient is Stable - Low risk of patient condition declining or worsening    Shift Goals  Clinical Goals: pain control, rest, OOB activity, I&Os  Patient Goals: rest, pain control  Family Goals: n/a    Progress made toward(s) clinical / shift goals:    Pain was well controlled throughout the day. Eckert was removed. Pt voided since. Pt got up to walk around the unit.    Patient is not progressing towards the following goals:

## 2023-07-28 NOTE — CARE PLAN
The patient is Stable - Low risk of patient condition declining or worsening    Shift Goals  Clinical Goals: Pain control, BM  Patient Goals: rest  Family Goals: n/a    Progress made toward(s) clinical / shift goals:    Problem: Pain - Standard  Goal: Alleviation of pain or a reduction in pain to the patient’s comfort goal  Description: Target End Date:  Prior to discharge or change in level of care    Document on Vitals flowsheet    1.  Document pain using the appropriate pain scale per order or unit policy  2.  Educate and implement non-pharmacologic comfort measures (i.e. relaxation, distraction, massage, cold/heat therapy, etc.)  3.  Pain management medications as ordered  4.  Reassess pain after pain med administration per policy  5.  If opiods administered assess patient's response to pain medication is appropriate per POSS sedation scale  6.  Follow pain management plan developed in collaboration with patient and interdisciplinary team (including palliative care or pain specialists if applicable)  Outcome: Progressing     Problem: Knowledge Deficit - Standard  Goal: Patient and family/care givers will demonstrate understanding of plan of care, disease process/condition, diagnostic tests and medications  Description: Target End Date:  1-3 days or as soon as patient condition allows    Document in Patient Education    1.  Patient and family/caregiver oriented to unit, equipment, visitation policy and means for communicating concern  2.  Complete/review Learning Assessment  3.  Assess knowledge level of disease process/condition, treatment plan, diagnostic tests and medications  4.  Explain disease process/condition, treatment plan, diagnostic tests and medications  Outcome: Progressing     Problem: Fall Risk  Goal: Patient will remain free from falls  Description: Target End Date:  Prior to discharge or change in level of care    Document interventions on the Angela Rodríguez Fall Risk Assessment    1.  Assess for  fall risk factors  2.  Implement fall precautions  Outcome: Progressing

## 2023-07-28 NOTE — CARE PLAN
Pt medically cleared for dc per MD order. Pt very pleasant/cooperative, A&Ox4. VSS on RA. C/o generalized abdominal pain relieved with PRN oxy. VSS on 0.5LNC. IS at bedside to help improve oxygenation. Tolerating regular diet. Lap sites CDI, HEATHER drain removed. OOB independently. AVS printed and reviewed. Pt educated on s/s of infection, as well as activity and diet restrictions. Dc meds retrieved from inpatient pharmacy. IV removed. Transportation arranged with pt family. No further questions/concerns at this time.      Problem: Pain - Standard  Goal: Alleviation of pain or a reduction in pain to the patient’s comfort goal  Outcome: Met     Problem: Knowledge Deficit - Standard  Goal: Patient and family/care givers will demonstrate understanding of plan of care, disease process/condition, diagnostic tests and medications  Outcome: Met     Problem: Fall Risk  Goal: Patient will remain free from falls  Outcome: Met

## 2023-07-28 NOTE — PROGRESS NOTES
Received report from previous shift RN  Assessment complete.  A&O x 4. Patient calls appropriately.  Patient is upself. Bed alarm off.   Patient has 8/10 pain. Pain managed with prescribed medications.  Denies N&V. Tolerating regular diet.  X5 lap sites to abdomen.  RLQ HEATHER drain, to bulb suction  + void, + flatus, - BM.  Patient denies SOB.  SCD's on.    Review plan of care with patient. Call light and personal belongings with in reach. Hourly rounding in place. All needs met at this time.

## 2023-08-08 ENCOUNTER — APPOINTMENT (OUTPATIENT)
Dept: RADIOLOGY | Facility: IMAGING CENTER | Age: 43
End: 2023-08-08
Attending: PHYSICIAN ASSISTANT
Payer: COMMERCIAL

## 2023-08-08 ENCOUNTER — OFFICE VISIT (OUTPATIENT)
Dept: URGENT CARE | Facility: CLINIC | Age: 43
End: 2023-08-08
Payer: COMMERCIAL

## 2023-08-08 ENCOUNTER — HOSPITAL ENCOUNTER (OUTPATIENT)
Dept: LAB | Facility: MEDICAL CENTER | Age: 43
End: 2023-08-08
Attending: INTERNAL MEDICINE
Payer: COMMERCIAL

## 2023-08-08 VITALS
DIASTOLIC BLOOD PRESSURE: 74 MMHG | HEIGHT: 71 IN | RESPIRATION RATE: 16 BRPM | SYSTOLIC BLOOD PRESSURE: 118 MMHG | OXYGEN SATURATION: 98 % | BODY MASS INDEX: 27.3 KG/M2 | WEIGHT: 195 LBS | TEMPERATURE: 97.8 F | HEART RATE: 76 BPM

## 2023-08-08 DIAGNOSIS — S29.9XXA TRAUMATIC INJURY OF RIB: ICD-10-CM

## 2023-08-08 DIAGNOSIS — S20.211A CONTUSION OF RIB ON RIGHT SIDE, INITIAL ENCOUNTER: ICD-10-CM

## 2023-08-08 LAB
25(OH)D3 SERPL-MCNC: 27 NG/ML (ref 30–100)
ALBUMIN SERPL BCP-MCNC: 4.3 G/DL (ref 3.2–4.9)
ALBUMIN/GLOB SERPL: 1.3 G/DL
ALP SERPL-CCNC: 103 U/L (ref 30–99)
ALT SERPL-CCNC: 24 U/L (ref 2–50)
ANION GAP SERPL CALC-SCNC: 9 MMOL/L (ref 7–16)
ANISOCYTOSIS BLD QL SMEAR: ABNORMAL
AST SERPL-CCNC: 38 U/L (ref 12–45)
BASOPHILS # BLD AUTO: 1.8 % (ref 0–1.8)
BASOPHILS # BLD: 0.13 K/UL (ref 0–0.12)
BILIRUB SERPL-MCNC: 3.1 MG/DL (ref 0.1–1.5)
BUN SERPL-MCNC: 11 MG/DL (ref 8–22)
CALCIUM ALBUM COR SERPL-MCNC: 9 MG/DL (ref 8.5–10.5)
CALCIUM SERPL-MCNC: 9.2 MG/DL (ref 8.5–10.5)
CHLORIDE SERPL-SCNC: 98 MMOL/L (ref 96–112)
CO2 SERPL-SCNC: 30 MMOL/L (ref 20–33)
CREAT SERPL-MCNC: 0.63 MG/DL (ref 0.5–1.4)
CREAT UR-MCNC: 102.76 MG/DL
DACRYOCYTES BLD QL SMEAR: NORMAL
EOSINOPHIL # BLD AUTO: 0.18 K/UL (ref 0–0.51)
EOSINOPHIL NFR BLD: 2.6 % (ref 0–6.9)
ERYTHROCYTE [DISTWIDTH] IN BLOOD BY AUTOMATED COUNT: 51.3 FL (ref 35.9–50)
EST. AVERAGE GLUCOSE BLD GHB EST-MCNC: 88 MG/DL
GFR SERPLBLD CREATININE-BSD FMLA CKD-EPI: 120 ML/MIN/1.73 M 2
GLOBULIN SER CALC-MCNC: 3.2 G/DL (ref 1.9–3.5)
GLUCOSE SERPL-MCNC: 112 MG/DL (ref 65–99)
HBA1C MFR BLD: 4.7 % (ref 4–5.6)
HCT VFR BLD AUTO: 38.2 % (ref 42–52)
HGB BLD-MCNC: 11.4 G/DL (ref 14–18)
HYPOCHROMIA BLD QL SMEAR: ABNORMAL
LYMPHOCYTES # BLD AUTO: 1.83 K/UL (ref 1–4.8)
LYMPHOCYTES NFR BLD: 26.1 % (ref 22–41)
MACROCYTES BLD QL SMEAR: ABNORMAL
MANUAL DIFF BLD: NORMAL
MCH RBC QN AUTO: 21 PG (ref 27–33)
MCHC RBC AUTO-ENTMCNC: 29.8 G/DL (ref 32.3–36.5)
MCV RBC AUTO: 70.5 FL (ref 81.4–97.8)
MICROALBUMIN UR-MCNC: <1.2 MG/DL
MICROALBUMIN/CREAT UR: NORMAL MG/G (ref 0–30)
MICROCYTES BLD QL SMEAR: ABNORMAL
MONOCYTES # BLD AUTO: 0.12 K/UL (ref 0–0.85)
MONOCYTES NFR BLD AUTO: 1.7 % (ref 0–13.4)
MORPHOLOGY BLD-IMP: NORMAL
NEUTROPHILS # BLD AUTO: 4.75 K/UL (ref 1.82–7.42)
NEUTROPHILS NFR BLD: 67.8 % (ref 44–72)
NRBC # BLD AUTO: 0.04 K/UL
NRBC BLD-RTO: 0.6 /100 WBC (ref 0–0.2)
OVALOCYTES BLD QL SMEAR: NORMAL
PLATELET # BLD AUTO: 243 K/UL (ref 164–446)
PLATELET BLD QL SMEAR: NORMAL
POIKILOCYTOSIS BLD QL SMEAR: NORMAL
POTASSIUM SERPL-SCNC: 4.4 MMOL/L (ref 3.6–5.5)
PROT SERPL-MCNC: 7.5 G/DL (ref 6–8.2)
RBC # BLD AUTO: 5.42 M/UL (ref 4.7–6.1)
RBC BLD AUTO: PRESENT
SODIUM SERPL-SCNC: 137 MMOL/L (ref 135–145)
WBC # BLD AUTO: 7 K/UL (ref 4.8–10.8)

## 2023-08-08 PROCEDURE — 82043 UR ALBUMIN QUANTITATIVE: CPT

## 2023-08-08 PROCEDURE — 85025 COMPLETE CBC W/AUTO DIFF WBC: CPT

## 2023-08-08 PROCEDURE — 36415 COLL VENOUS BLD VENIPUNCTURE: CPT

## 2023-08-08 PROCEDURE — 3074F SYST BP LT 130 MM HG: CPT | Performed by: PHYSICIAN ASSISTANT

## 2023-08-08 PROCEDURE — 83036 HEMOGLOBIN GLYCOSYLATED A1C: CPT

## 2023-08-08 PROCEDURE — 3078F DIAST BP <80 MM HG: CPT | Performed by: PHYSICIAN ASSISTANT

## 2023-08-08 PROCEDURE — 80053 COMPREHEN METABOLIC PANEL: CPT

## 2023-08-08 PROCEDURE — 85007 BL SMEAR W/DIFF WBC COUNT: CPT

## 2023-08-08 PROCEDURE — 71101 X-RAY EXAM UNILAT RIBS/CHEST: CPT | Mod: TC,RT | Performed by: RADIOLOGY

## 2023-08-08 PROCEDURE — 82570 ASSAY OF URINE CREATININE: CPT

## 2023-08-08 PROCEDURE — 99214 OFFICE O/P EST MOD 30 MIN: CPT | Performed by: PHYSICIAN ASSISTANT

## 2023-08-08 PROCEDURE — 82306 VITAMIN D 25 HYDROXY: CPT

## 2023-08-08 RX ORDER — CYCLOBENZAPRINE HCL 10 MG
10 TABLET ORAL 3 TIMES DAILY PRN
Qty: 30 TABLET | Refills: 0 | Status: SHIPPED | OUTPATIENT
Start: 2023-08-08

## 2023-08-08 ASSESSMENT — FIBROSIS 4 INDEX: FIB4 SCORE: 1.31

## 2023-08-08 NOTE — PROGRESS NOTES
Subjective     River Olson is a very pleasant 42 y.o. male who presents with Fall and Rib Injury (Happened on Saturday )            HPI  Patient fell while getting out of the tub landing on his right rib cage.  He has significant pain, swelling and bruising on his lateral and posterior right mid ribs.  There is no head injury or loss of consciousness.  He denies shortness of breath, hemoptysis, fever or chest pain.  Pain with deep breathing coughing and position.  Of note he is at home on leave due to to a bowel resection secondary to diverticulitis approximately 10 days ago.  He vehemently declines abdominal pain, bloating, hematuria, bloody stools, fever, vomiting.  He states his abdomen is actually doing very well.  Patient did take a Norco which she had from his surgery with good relief      PMH:  has a past medical history of Arrhythmia, ASTHMA, Bowel habit changes, Hypertension (2014), Infectious disease (age 6), Pneumonia (05/2023), Psychiatric problem, Thalassanemia (1979), and Thalassemia.    He has no past medical history of Diabetes or Type II or unspecified type diabetes mellitus without mention of complication, not stated as uncontrolled.  MEDS:   Current Outpatient Medications:     cyclobenzaprine (FLEXERIL) 10 mg Tab, Take 1 Tablet by mouth 3 times a day as needed for Muscle Spasms or Moderate Pain., Disp: 30 Tablet, Rfl: 0  ALLERGIES: No Known Allergies  SURGHX:   Past Surgical History:   Procedure Laterality Date    LOW ANTERIOR RESECTION ROBOTIC XI  7/26/2023    Procedure: RESECTION, LOW ANTERIOR, ROBOT-ASSISTED, LAPAROSCOPIC, USING DA GAVIN XI;  Surgeon: Cipriano Arteaga M.D.;  Location: SURGERY [facility];  Service: Gen Robotic    OTHER  greater than 10 years    gall bladder removed    OTHER ABDOMINAL SURGERY      hernia repair as child     SOCHX:  reports that he quit smoking about 5 years ago. His smoking use included cigarettes. He started smoking about 22 years ago. He has a 4.25  "pack-year smoking history. He has quit using smokeless tobacco.  His smokeless tobacco use included chew. He reports current alcohol use. He reports that he does not use drugs.  FH: family history includes Alcohol abuse in his mother; Diabetes in his maternal grandfather; Heart Disease in his maternal grandfather.        ROS           Objective     /74 (BP Location: Right arm, Patient Position: Sitting, BP Cuff Size: Adult long)   Pulse 76   Temp 36.6 °C (97.8 °F) (Temporal)   Resp 16   Ht 1.803 m (5' 11\")   Wt 88.5 kg (195 lb)   SpO2 98%   BMI 27.20 kg/m²      Physical Exam  Vitals and nursing note reviewed.   Constitutional:       General: He is not in acute distress.     Appearance: Normal appearance. He is well-developed. He is not diaphoretic.   HENT:      Head: Normocephalic.      Right Ear: Hearing and external ear normal.      Left Ear: Hearing and external ear normal.      Nose: Nose normal.      Mouth/Throat:      Mouth: Mucous membranes are moist.   Eyes:      General:         Right eye: No discharge.         Left eye: No discharge.      Conjunctiva/sclera: Conjunctivae normal.   Cardiovascular:      Rate and Rhythm: Normal rate and regular rhythm.      Pulses: Normal pulses.      Heart sounds: Normal heart sounds.   Pulmonary:      Effort: Pulmonary effort is normal. No respiratory distress.      Breath sounds: Normal breath sounds. No stridor. No wheezing, rhonchi or rales.   Chest:      Chest wall: Swelling and tenderness present. No deformity, crepitus or edema. There is no dullness to percussion.          Comments: Exquisite TTP, edema and bruising of his right sided lateral chest wall into the posterior rib cage.  Abdominal:      General: Abdomen is flat. There is no distension.      Tenderness: There is no abdominal tenderness. There is no guarding or rebound.      Comments: Surgical wounds investigated and staples remain intact without dehiscence.   Musculoskeletal:      Cervical " back: Normal range of motion and neck supple.   Skin:     General: Skin is warm and dry.   Neurological:      General: No focal deficit present.      Mental Status: He is alert and oriented to person, place, and time. Mental status is at baseline.   Psychiatric:         Mood and Affect: Mood normal.         Behavior: Behavior normal.         Thought Content: Thought content normal.         Judgment: Judgment normal.                             Assessment & Plan     This is a very pleasant 42-year-old male presenting 3 days after a fall in the bathtub injuring his right chest wall.  He has exquisite tenderness, swelling, bruising of his right lateral and posterior rib cage.  He denies head injury, loss of consciousness, shortness of breath, hemoptysis, fever or chest pain.  Pain with deep breathing and certain movements.  Of note he had a bowel resection 10 days ago secondary to diverticulitis.  He denies abdominal pain, bloating, hematuria, vomiting, bloody stools.  He has no concerns pertaining to his abdomen at this time.  Vital signs are normal and pO2 is adequate.  Exam reveals exquisite point TTP with swelling edema and bruising of his right lateral rib cage without gross deformity.  CTA bilaterally.  X-ray negative for fracture.  OTC meds, conservative measures and Flexeril for nighttime.  Encourage deep breathing and patient will begin using his incentive spirometer again.  ER and red flag symptoms discussed at length.    1. Traumatic injury of rib  [id number] (WITH 1-VIEW CXR) RIGHT      2. Contusion of rib on right side, initial encounter  cyclobenzaprine (FLEXERIL) 10 mg Tab          I personally reviewed prior external notes and test results pertinent to today's visit. Return to clinic or go to ED if symptoms worsen or persist. Red flag symptoms and indications for ED discussed at length. Patient/Parent/Guardian voices understanding. Follow-up with your primary care provider in 3-5 days. All side  effects and potential interactions of prescribed medication discussed including allergic response, GI upset, tendon injury, rash, sedation, OCP effectiveness, etc.    Please note that this dictation was created using voice recognition software. I have made every reasonable attempt to correct obvious errors, but I expect that there are errors of grammar and possibly content that I did not discover before finalizing the note.

## 2023-11-15 ENCOUNTER — NON-PROVIDER VISIT (OUTPATIENT)
Dept: OCCUPATIONAL MEDICINE | Facility: CLINIC | Age: 43
End: 2023-11-15

## 2023-11-15 DIAGNOSIS — Z11.1 PPD SCREENING TEST: Primary | ICD-10-CM

## 2023-11-16 ENCOUNTER — APPOINTMENT (OUTPATIENT)
Dept: RADIOLOGY | Facility: MEDICAL CENTER | Age: 43
End: 2023-11-16
Attending: EMERGENCY MEDICINE
Payer: COMMERCIAL

## 2023-11-16 ENCOUNTER — HOSPITAL ENCOUNTER (EMERGENCY)
Facility: MEDICAL CENTER | Age: 43
End: 2023-11-16
Attending: EMERGENCY MEDICINE
Payer: COMMERCIAL

## 2023-11-16 VITALS
TEMPERATURE: 97.8 F | WEIGHT: 230.82 LBS | RESPIRATION RATE: 23 BRPM | DIASTOLIC BLOOD PRESSURE: 75 MMHG | HEIGHT: 71 IN | BODY MASS INDEX: 32.31 KG/M2 | OXYGEN SATURATION: 93 % | HEART RATE: 104 BPM | SYSTOLIC BLOOD PRESSURE: 131 MMHG

## 2023-11-16 DIAGNOSIS — R60.9 PERIPHERAL EDEMA: ICD-10-CM

## 2023-11-16 DIAGNOSIS — R00.0 SINUS TACHYCARDIA: ICD-10-CM

## 2023-11-16 DIAGNOSIS — R07.9 CHEST PAIN, UNSPECIFIED TYPE: ICD-10-CM

## 2023-11-16 LAB
ALBUMIN SERPL BCP-MCNC: 3.9 G/DL (ref 3.2–4.9)
ALBUMIN/GLOB SERPL: 1.5 G/DL
ALP SERPL-CCNC: 67 U/L (ref 30–99)
ALT SERPL-CCNC: 20 U/L (ref 2–50)
ANION GAP SERPL CALC-SCNC: 10 MMOL/L (ref 7–16)
APTT PPP: 30 SEC (ref 24.7–36)
AST SERPL-CCNC: 34 U/L (ref 12–45)
BASOPHILS # BLD AUTO: 0.5 % (ref 0–1.8)
BASOPHILS # BLD: 0.03 K/UL (ref 0–0.12)
BILIRUB SERPL-MCNC: 4.8 MG/DL (ref 0.1–1.5)
BUN SERPL-MCNC: 9 MG/DL (ref 8–22)
CALCIUM ALBUM COR SERPL-MCNC: 8.5 MG/DL (ref 8.5–10.5)
CALCIUM SERPL-MCNC: 8.4 MG/DL (ref 8.5–10.5)
CHLORIDE SERPL-SCNC: 103 MMOL/L (ref 96–112)
CO2 SERPL-SCNC: 23 MMOL/L (ref 20–33)
CREAT SERPL-MCNC: 0.59 MG/DL (ref 0.5–1.4)
EKG IMPRESSION: NORMAL
EOSINOPHIL # BLD AUTO: 0.13 K/UL (ref 0–0.51)
EOSINOPHIL NFR BLD: 2.3 % (ref 0–6.9)
ERYTHROCYTE [DISTWIDTH] IN BLOOD BY AUTOMATED COUNT: 60.3 FL (ref 35.9–50)
GFR SERPLBLD CREATININE-BSD FMLA CKD-EPI: 123 ML/MIN/1.73 M 2
GLOBULIN SER CALC-MCNC: 2.6 G/DL (ref 1.9–3.5)
GLUCOSE SERPL-MCNC: 106 MG/DL (ref 65–99)
HCT VFR BLD AUTO: 27.1 % (ref 42–52)
HGB BLD-MCNC: 8.5 G/DL (ref 14–18)
IMM GRANULOCYTES # BLD AUTO: 0.06 K/UL (ref 0–0.11)
IMM GRANULOCYTES NFR BLD AUTO: 1.1 % (ref 0–0.9)
INR PPP: 1.28 (ref 0.87–1.13)
LACTATE SERPL-SCNC: 1.1 MMOL/L (ref 0.5–2)
LYMPHOCYTES # BLD AUTO: 1.16 K/UL (ref 1–4.8)
LYMPHOCYTES NFR BLD: 20.6 % (ref 22–41)
MCH RBC QN AUTO: 23.2 PG (ref 27–33)
MCHC RBC AUTO-ENTMCNC: 31.4 G/DL (ref 32.3–36.5)
MCV RBC AUTO: 74 FL (ref 81.4–97.8)
MONOCYTES # BLD AUTO: 0.58 K/UL (ref 0–0.85)
MONOCYTES NFR BLD AUTO: 10.3 % (ref 0–13.4)
NEUTROPHILS # BLD AUTO: 3.67 K/UL (ref 1.82–7.42)
NEUTROPHILS NFR BLD: 65.2 % (ref 44–72)
NRBC # BLD AUTO: 0.15 K/UL
NRBC BLD-RTO: 2.7 /100 WBC (ref 0–0.2)
NT-PROBNP SERPL IA-MCNC: 79 PG/ML (ref 0–125)
PLATELET # BLD AUTO: 97 K/UL (ref 164–446)
POTASSIUM SERPL-SCNC: 4.3 MMOL/L (ref 3.6–5.5)
PROT SERPL-MCNC: 6.5 G/DL (ref 6–8.2)
PROTHROMBIN TIME: 16.2 SEC (ref 12–14.6)
RBC # BLD AUTO: 3.66 M/UL (ref 4.7–6.1)
SODIUM SERPL-SCNC: 136 MMOL/L (ref 135–145)
TROPONIN T SERPL-MCNC: 8 NG/L (ref 6–19)
TROPONIN T SERPL-MCNC: 8 NG/L (ref 6–19)
WBC # BLD AUTO: 5.6 K/UL (ref 4.8–10.8)

## 2023-11-16 PROCEDURE — 700117 HCHG RX CONTRAST REV CODE 255: Performed by: EMERGENCY MEDICINE

## 2023-11-16 PROCEDURE — 85610 PROTHROMBIN TIME: CPT

## 2023-11-16 PROCEDURE — 80053 COMPREHEN METABOLIC PANEL: CPT

## 2023-11-16 PROCEDURE — 71045 X-RAY EXAM CHEST 1 VIEW: CPT

## 2023-11-16 PROCEDURE — 83605 ASSAY OF LACTIC ACID: CPT

## 2023-11-16 PROCEDURE — 85730 THROMBOPLASTIN TIME PARTIAL: CPT

## 2023-11-16 PROCEDURE — 36415 COLL VENOUS BLD VENIPUNCTURE: CPT

## 2023-11-16 PROCEDURE — 84484 ASSAY OF TROPONIN QUANT: CPT | Mod: 91

## 2023-11-16 PROCEDURE — 83880 ASSAY OF NATRIURETIC PEPTIDE: CPT

## 2023-11-16 PROCEDURE — 93970 EXTREMITY STUDY: CPT

## 2023-11-16 PROCEDURE — 94760 N-INVAS EAR/PLS OXIMETRY 1: CPT

## 2023-11-16 PROCEDURE — 93005 ELECTROCARDIOGRAM TRACING: CPT | Performed by: EMERGENCY MEDICINE

## 2023-11-16 PROCEDURE — 71275 CT ANGIOGRAPHY CHEST: CPT

## 2023-11-16 PROCEDURE — 93005 ELECTROCARDIOGRAM TRACING: CPT

## 2023-11-16 PROCEDURE — 99285 EMERGENCY DEPT VISIT HI MDM: CPT

## 2023-11-16 PROCEDURE — 85025 COMPLETE CBC W/AUTO DIFF WBC: CPT

## 2023-11-16 RX ADMIN — IOHEXOL 4 ML: 350 INJECTION, SOLUTION INTRAVENOUS at 19:02

## 2023-11-16 ASSESSMENT — FIBROSIS 4 INDEX: FIB4 SCORE: 1.4

## 2023-11-16 NOTE — ED TRIAGE NOTES
Chief Complaint   Patient presents with    Leg Swelling     Patient ambulatory to room with steady gait. Reports that he has bilateral leg swelling for the last 5 days. Right leg swelling greater then left. +2 pitting edema in right leg and foot. Patient states he had 1 episode of chest pain last night. Denies chest pain in triage.   Reports that swelling has gone down since this morning.

## 2023-11-17 NOTE — ED PROVIDER NOTES
ED Provider Note    CHIEF COMPLAINT  Chief Complaint   Patient presents with    Leg Swelling       EXTERNAL RECORDS REVIEWED  Outpatient Notes reviewed office visit progress note Maylin Alexander dated August 8, 2023.  Patient seen for chest wall contusion, diagnosed with right rib contusion and started on Flexeril.    HPI/ROS  LIMITATION TO HISTORY   Select: : None  OUTSIDE HISTORIAN(S):  None available    River Olson is a 43 y.o. with history of thalassemia minor male who presents for evaluation of acute swelling to his legs.  Patient notes bilateral but much more so on the right.  Swelling for the last 5 to 6 days.  No particular trauma.  He notes symptoms seem somewhat worse and wakes up in the morning but will improve when he walks around.  He has not had any similar such symptoms in the past.  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  Patient notes mild sharp pain to left side of the chest last night that lasted only few seconds and self resolved.  No dyspnea, no exertional component.  He has a history of thalassemia minor and is known to be anemic, he also notes heart rate tends to run in the high 90s at baseline.  He is ever had problems with swelling in the past and as such she came to be assessed.    PAST MEDICAL HISTORY   has a past medical history of Arrhythmia, ASTHMA, Bowel habit changes, Hypertension (2014), Infectious disease (age 6), Pneumonia (05/2023), Psychiatric problem, Thalassanemia (1979), and Thalassemia.    SURGICAL HISTORY   has a past surgical history that includes other (greater than 10 years); other abdominal surgery; and low anterior resection robotic xi (7/26/2023).    FAMILY HISTORY  Family History   Problem Relation Age of Onset    Alcohol abuse Mother     Diabetes Maternal Grandfather     Heart Disease Maternal Grandfather        SOCIAL HISTORY  Social  "History     Tobacco Use    Smoking status: Former     Current packs/day: 0.00     Average packs/day: 0.3 packs/day for 17.0 years (4.3 ttl pk-yrs)     Types: Cigarettes     Start date: 10/10/2000     Quit date: 10/10/2017     Years since quittin.1    Smokeless tobacco: Former     Types: Chew    Tobacco comments:     quit 2011   Vaping Use    Vaping Use: Never used   Substance and Sexual Activity    Alcohol use: Yes     Comment: 2 shoots per day liquer vodka    Drug use: No    Sexual activity: Not on file       CURRENT MEDICATIONS  Home Medications       Reviewed by Deanna Valles R.N. (Registered Nurse) on 23 at 1528  Med List Status: Not Addressed     Medication Last Dose Status   cyclobenzaprine (FLEXERIL) 10 mg Tab 2023 Active                    ALLERGIES  No Known Allergies    PHYSICAL EXAM  VITAL SIGNS: /75   Pulse (!) 104   Temp 36.6 °C (97.8 °F) (Temporal)   Resp (!) 23   Ht 1.803 m (5' 11\")   Wt 105 kg (230 lb 13.2 oz)   SpO2 93%   BMI 32.19 kg/m²    General: Alert, no acute distress  Skin: Warm, dry, mildly pale, otherwise normal for ethnicity  Head: Normocephalic, atraumatic  Neck: Trachea midline, no tenderness  Eye: PERRL, normal conjunctiva  ENMT: Oral mucosa moist, no pharyngeal erythema or exudate  Cardiovascular: S1, S2, mildly tachycardic, otherwise regular rate and rhythm, No murmur, Normal peripheral perfusion  Respiratory: Lungs CTA, respirations are non-labored, breath sounds are equal  Musculoskeletal: Moderate circumferential swelling to the right leg, trace edema only noted to the left.  2+ DP and PT pulses with brisk cap refill, not cold to touch, no cyanosis or discoloration.  No calf tenderness, no palpable venous cord, no erythema.  Neurological: Alert and oriented to person, place, time, and situation  Lymphatics: No lymphadenopathy  Psychiatric: Cooperative, appropriate mood & affect     DIAGNOSTIC STUDIES / PROCEDURES  EKG  I have independently " interpreted this EKG  EKG Interpretation    Interpreted by emergency department physician    Rhythm: sinus tachycardia  Rate: 109  Axis: left  Ectopy: none  Conduction: left bundle branch block (complete)  ST Segments: nonspecific changes  T Waves: no acute change  Q Waves: none    Clinical Impression: left bundle branch block, left bundle branch block is new compared to previous EKG dated May 18, 2023    LABS  Results for orders placed or performed during the hospital encounter of 11/16/23   CBC with Differential   Result Value Ref Range    WBC 5.6 4.8 - 10.8 K/uL    RBC 3.66 (L) 4.70 - 6.10 M/uL    Hemoglobin 8.5 (L) 14.0 - 18.0 g/dL    Hematocrit 27.1 (L) 42.0 - 52.0 %    MCV 74.0 (L) 81.4 - 97.8 fL    MCH 23.2 (L) 27.0 - 33.0 pg    MCHC 31.4 (L) 32.3 - 36.5 g/dL    RDW 60.3 (H) 35.9 - 50.0 fL    Platelet Count 97 (L) 164 - 446 K/uL    Neutrophils-Polys 65.20 44.00 - 72.00 %    Lymphocytes 20.60 (L) 22.00 - 41.00 %    Monocytes 10.30 0.00 - 13.40 %    Eosinophils 2.30 0.00 - 6.90 %    Basophils 0.50 0.00 - 1.80 %    Immature Granulocytes 1.10 (H) 0.00 - 0.90 %    Nucleated RBC 2.70 (H) 0.00 - 0.20 /100 WBC    Neutrophils (Absolute) 3.67 1.82 - 7.42 K/uL    Lymphs (Absolute) 1.16 1.00 - 4.80 K/uL    Monos (Absolute) 0.58 0.00 - 0.85 K/uL    Eos (Absolute) 0.13 0.00 - 0.51 K/uL    Baso (Absolute) 0.03 0.00 - 0.12 K/uL    Immature Granulocytes (abs) 0.06 0.00 - 0.11 K/uL    NRBC (Absolute) 0.15 K/uL   Complete Metabolic Panel (CMP)   Result Value Ref Range    Sodium 136 135 - 145 mmol/L    Potassium 4.3 3.6 - 5.5 mmol/L    Chloride 103 96 - 112 mmol/L    Co2 23 20 - 33 mmol/L    Anion Gap 10.0 7.0 - 16.0    Glucose 106 (H) 65 - 99 mg/dL    Bun 9 8 - 22 mg/dL    Creatinine 0.59 0.50 - 1.40 mg/dL    Calcium 8.4 (L) 8.5 - 10.5 mg/dL    Correct Calcium 8.5 8.5 - 10.5 mg/dL    AST(SGOT) 34 12 - 45 U/L    ALT(SGPT) 20 2 - 50 U/L    Alkaline Phosphatase 67 30 - 99 U/L    Total Bilirubin 4.8 (H) 0.1 - 1.5 mg/dL    Albumin  3.9 3.2 - 4.9 g/dL    Total Protein 6.5 6.0 - 8.2 g/dL    Globulin 2.6 1.9 - 3.5 g/dL    A-G Ratio 1.5 g/dL   Troponins NOW   Result Value Ref Range    Troponin T 8 6 - 19 ng/L   Troponins in two (2) hours   Result Value Ref Range    Troponin T 8 6 - 19 ng/L   ESTIMATED GFR   Result Value Ref Range    GFR (CKD-EPI) 123 >60 mL/min/1.73 m 2   APTT   Result Value Ref Range    APTT 30.0 24.7 - 36.0 sec   PT/INR   Result Value Ref Range    PT 16.2 (H) 12.0 - 14.6 sec    INR 1.28 (H) 0.87 - 1.13   LACTIC ACID   Result Value Ref Range    Lactic Acid 1.1 0.5 - 2.0 mmol/L   proBrain Natriuretic Peptide, NT   Result Value Ref Range    NT-proBNP 79 0 - 125 pg/mL   EKG   Result Value Ref Range    Report       [facility] Emergency Dept.    Test Date:  2023  Pt Name:    AMANDA PERAZA                 Department: ER  MRN:        [id number]                      Room:  Gender:     Male                         Technician: [id number]  :        1980                   Requested By:ER TRIAGE PROTOCOL  Order #:    [id number]                    Reading MD: CHANDRA KAMINSKI MD    Measurements  Intervals                                Axis  Rate:       109                          P:          62  LA:         154                          QRS:        -25  QRSD:       143                          T:          102  QT:         374  QTc:        504    Interpretive Statements  Sinus tachycardia  Left bundle branch block  Compared to ECG 2023 10:43:36  Left bundle-branch block now present  Sinus rhythm no longer present  ST (T wave) deviation no longer present  Electronically Signed On 2023 23:18:00 PST by CHANDRA KAMINSKI MD          RADIOLOGY  I have independently interpreted the diagnostic imaging associated with this visit and am waiting the final reading from the radiologist.   My preliminary interpretation is as follows: No evidence of pulmonary embolism on CTA.  Ultrasound is reassuring with no  evidence suggestive of DVT.  Radiologist interpretation:   US-EXTREMITY VENOUS LOWER BILAT   Final Result      CT-CTA CHEST PULMONARY ARTERY W/ RECONS   Final Result      1. No acute pulmonary embolus.   2. Hepatosplenomegaly.   3. Cardiomegaly.      No pulmonary nodule to warrant further imaging follow-up as per Fleischner Society guidelines.      DX-CHEST-PORTABLE (1 VIEW)   Final Result      No acute cardiopulmonary abnormality.           COURSE & MEDICAL DECISION MAKING    ED Observation Status? Yes; I am placing the patient in to an observation status due to a diagnostic uncertainty as well as therapeutic intensity. Patient placed in observation status at 5:11 PM, 11/16/2023.     Observation plan is as follows: Cardiac work-up as well as bilateral lower extremity venous Doppler and CTA of chest will be obtained.  Differential diagnosis at this point includes but is not restricted to pulmonary embolism, DVT, HILARY, hepatic insufficiency, peripheral edema    1951: Patient reassessed, heart rate is improving without pharmacologic intervention, currently 104 which is approximating his baseline.  I have updated him with reassuring studies, thankfully no evidence of DVT or PE.  No evidence of acute kidney injury nor hepatic insufficiency.  Labs demonstrate anemia consistent with his known thalassemia minor.    Upon Reevaluation, the patient's condition has: Improved; and will be discharged.    Patient discharged from ED Observation status at 1958 (Time) 11/16/23 (Date).     INITIAL ASSESSMENT, COURSE AND PLAN  Care Narrative: This is a 43-year-old male with history of thalassemia minor presents for evaluation swelling primarily the right leg but also involving the left last several days without any trauma.  He notes brief seconds long episode of chest pain last night.  Reassuring EKG demonstrating left bundle branch block but negative with regard to Sgarbossa criteria for ischemia, in addition serial troponins are  nonischemic, this would not be consistent with ACS.  No need to follow-up with regard to the development of left bundle branch block however.  Heart score as below, low risk stratification.  Given his tachycardia and unilateral limb swelling certainly thromboembolic etiology must be considered.  Lower extremity venous Doppler bilateral be obtained, given his tachycardia and albeit brief episode of chest pain CTA of chest also indicated given certainly high risk with regard to potential pulmonary embolism.  Thankfully these were both negative.  There is no evidence of any life-threatening etiology of edema, no evidence of CHF, no HILARY, no hepatic insufficiency.  As such no indication for inpatient management.  HTN/IDDM FOLLOW UP:  The patient has known hypertension and is being followed by their primary care doctor      ADDITIONAL PROBLEM LIST  Sinus tachycardia, lower extremity edema, brief and resolved chest pain  DISPOSITION AND DISCUSSIONS  I have discussed management of the patient with the following physicians and CHLOE's:  NA    Discussion of management with other QHP or appropriate source(s): None     Escalation of care considered, and ultimately not performed:acute inpatient care management, however at this time, the patient is most appropriate for outpatient management    Barriers to care at this time, including but not limited to:  NA .     Decision tools and prescription drugs considered including, but not limited to: HEART Score 2, low risk stratification .    The patient will return for new or worsening symptoms and is stable at the time of discharge.    Patient has had high blood pressure while in the emergency department, felt likely secondary to medical condition. Counseled patient to monitor blood pressure at home and follow up with primary care physician.      DISPOSITION:  Patient will be discharged home in stable condition.    FOLLOW UP:  Jose Tidwell M.D.  [address] [place] [address] [address]  [place]  [id number]  [phone]    Schedule an appointment as soon as possible for a visit         OUTPATIENT MEDICATIONS:  Discharge Medication List as of 11/16/2023  8:01 PM             FINAL DIAGNOSIS  1. Peripheral edema    2. Sinus tachycardia    3. Chest pain, unspecified type           Electronically signed by: José Miguel Blackwood M.D., 11/16/2023 5:02 PM

## 2023-11-17 NOTE — ED NOTES
Pt was RV@[id number]. PT's symptoms have remained the same at this time. Advised them to please let us know if anything changes.

## 2023-11-17 NOTE — ED NOTES
Bedside report received from Beth ROSE. Pt on cardiac monitor, pulse ox, and automatic BP. Fall precautions in place. Call light within reach. No supplemental O2 use at this time

## 2023-11-20 PROCEDURE — 86580 TB INTRADERMAL TEST: CPT | Performed by: NURSE PRACTITIONER

## 2023-11-20 NOTE — PROGRESS NOTES
River Olson is a 43 y.o. male here for a non-provider visit for PPD placement -- Step 1 of 1    Reason for PPD:  work requirement    1. TB evaluation questionnaire completed by patient? Yes      -  If any answers marked yes did you contact a provider prior to placing? Not Indicated  2.  Patient notified to return to clinic for reading on: 11/17 after 3pm, before 11/18 at 3pm  3.  PPD Placement documentation completed on TB evaluation questionnaire? Yes  4.  Location of TB evaluation questionnaire filed: [facility]

## 2024-10-15 ENCOUNTER — NON-PROVIDER VISIT (OUTPATIENT)
Dept: OCCUPATIONAL MEDICINE | Facility: CLINIC | Age: 44
End: 2024-10-15

## 2024-10-15 DIAGNOSIS — Z11.1 ENCOUNTER FOR PPD TEST: ICD-10-CM

## 2024-10-15 PROCEDURE — 86580 TB INTRADERMAL TEST: CPT | Performed by: PREVENTIVE MEDICINE

## 2024-10-17 ENCOUNTER — NON-PROVIDER VISIT (OUTPATIENT)
Dept: OCCUPATIONAL MEDICINE | Facility: CLINIC | Age: 44
End: 2024-10-17

## 2024-10-17 LAB — TB WHEAL 3D P 5 TU DIAM: NORMAL MM

## 2024-11-14 ENCOUNTER — HOSPITAL ENCOUNTER (EMERGENCY)
Facility: MEDICAL CENTER | Age: 44
End: 2024-11-14
Attending: STUDENT IN AN ORGANIZED HEALTH CARE EDUCATION/TRAINING PROGRAM
Payer: COMMERCIAL

## 2024-11-14 VITALS
DIASTOLIC BLOOD PRESSURE: 108 MMHG | BODY MASS INDEX: 29.4 KG/M2 | OXYGEN SATURATION: 93 % | RESPIRATION RATE: 16 BRPM | WEIGHT: 210 LBS | HEART RATE: 86 BPM | HEIGHT: 71 IN | SYSTOLIC BLOOD PRESSURE: 151 MMHG

## 2024-11-14 DIAGNOSIS — Z77.21 EXPOSURE TO BLOOD OR BODY FLUID: ICD-10-CM

## 2024-11-14 LAB
ALBUMIN SERPL BCP-MCNC: 4.6 G/DL (ref 3.2–4.9)
ALBUMIN/GLOB SERPL: 1.6 G/DL
ALP SERPL-CCNC: 61 U/L (ref 30–99)
ALT SERPL-CCNC: 51 U/L (ref 2–50)
ANION GAP SERPL CALC-SCNC: 13 MMOL/L (ref 7–16)
AST SERPL-CCNC: 48 U/L (ref 12–45)
BILIRUB SERPL-MCNC: 3.8 MG/DL (ref 0.1–1.5)
BUN SERPL-MCNC: 15 MG/DL (ref 8–22)
CALCIUM ALBUM COR SERPL-MCNC: 8.9 MG/DL (ref 8.5–10.5)
CALCIUM SERPL-MCNC: 9.4 MG/DL (ref 8.5–10.5)
CHLORIDE SERPL-SCNC: 101 MMOL/L (ref 96–112)
CO2 SERPL-SCNC: 24 MMOL/L (ref 20–33)
CREAT SERPL-MCNC: 0.69 MG/DL (ref 0.5–1.4)
ERYTHROCYTE [DISTWIDTH] IN BLOOD BY AUTOMATED COUNT: 42.5 FL (ref 35.9–50)
GFR SERPLBLD CREATININE-BSD FMLA CKD-EPI: 116 ML/MIN/1.73 M 2
GLOBULIN SER CALC-MCNC: 2.8 G/DL (ref 1.9–3.5)
GLUCOSE SERPL-MCNC: 87 MG/DL (ref 65–99)
HBV CORE AB SERPL QL IA: NONREACTIVE
HBV SURFACE AB SERPL IA-ACNC: <3.5 MIU/ML (ref 0–10)
HBV SURFACE AG SER QL: ABNORMAL
HCT VFR BLD AUTO: 36.9 % (ref 42–52)
HCV AB SER QL: ABNORMAL
HGB BLD-MCNC: 11.5 G/DL (ref 14–18)
HIV 1+2 AB+HIV1 P24 AG SERPL QL IA: ABNORMAL
MCH RBC QN AUTO: 22.1 PG (ref 27–33)
MCHC RBC AUTO-ENTMCNC: 31.2 G/DL (ref 32.3–36.5)
MCV RBC AUTO: 70.8 FL (ref 81.4–97.8)
PLATELET # BLD AUTO: 153 K/UL (ref 164–446)
PLATELETS.RETICULATED NFR BLD AUTO: 9.8 % (ref 0.6–13.1)
POTASSIUM SERPL-SCNC: 4.1 MMOL/L (ref 3.6–5.5)
PROT SERPL-MCNC: 7.4 G/DL (ref 6–8.2)
RBC # BLD AUTO: 5.21 M/UL (ref 4.7–6.1)
SODIUM SERPL-SCNC: 138 MMOL/L (ref 135–145)
WBC # BLD AUTO: 6.5 K/UL (ref 4.8–10.8)

## 2024-11-14 PROCEDURE — 99283 EMERGENCY DEPT VISIT LOW MDM: CPT

## 2024-11-14 PROCEDURE — 87340 HEPATITIS B SURFACE AG IA: CPT

## 2024-11-14 PROCEDURE — 86704 HEP B CORE ANTIBODY TOTAL: CPT

## 2024-11-14 PROCEDURE — 85055 RETICULATED PLATELET ASSAY: CPT

## 2024-11-14 PROCEDURE — 80053 COMPREHEN METABOLIC PANEL: CPT

## 2024-11-14 PROCEDURE — 86706 HEP B SURFACE ANTIBODY: CPT

## 2024-11-14 PROCEDURE — 86803 HEPATITIS C AB TEST: CPT

## 2024-11-14 PROCEDURE — 85027 COMPLETE CBC AUTOMATED: CPT

## 2024-11-14 PROCEDURE — 87389 HIV-1 AG W/HIV-1&-2 AB AG IA: CPT

## 2024-11-14 ASSESSMENT — FIBROSIS 4 INDEX: FIB4 SCORE: 3.53

## 2024-11-14 NOTE — LETTER
"EMPLOYEE’S CLAIM FOR COMPENSATION/ REPORT OF INITIAL TREATMENT  FORM C-4  PLEASE TYPE OR PRINT  EMPLOYEE’S CLAIM - PROVIDE ALL INFORMATION REQUESTED   First Name                    JHOANA Holman                  Last Name  White Birthdate                    1980                Sex  Male Claim Number (Insurer’s Use Only)     Home Address  [address] Age  44 y.o. Height  1.803 m (5' 11\") Weight  95.3 kg (210 lb) Social Security Number     [facility] Zip  [address] Telephone  [phone]   Mailing Address  [address]  [address] Primary Language Spoken  English    INSURER   THIRD-PARTY   [employer]   Employee's Occupation (Job Title) When Injury or Occupational Disease Occurred     Employer's Name/Company Name  [employer]  Telephone  [phone]    Office Mail Address (Number and Street)  [address] [place] [address]   Date of Injury (if applicable)   11/14/2024            Hours Injury (if applicable)  02:30 PM   Date Employer Notified  11/14/2024   Last Day of Work after Injury or Occupational Disease  11/14/2024 Supervisor to Whom Injury Reported  [employer]   Address or Location of Accident (if applicable)     What were you doing at the time of accident? (if applicable)      How did this injury or occupational disease occur? (Be specific and answer in detail. Use additional sheet if necessary)     If you believe that you have an occupational disease, when did you first have knowledge of the disability and its relationship to your employment?   Witnesses to the Accident (if applicable)        Nature of Injury or Occupational Disease    Part(s) of Body Injured or Affected        I CERTIFY THAT THE ABOVE IS TRUE AND CORRECT TO T HE BEST OF MY KNOWLEDGE AND THAT I HAVE PROVIDED THIS INFORMATION IN ORDER TO OBTAIN THE BENEFITS OF [place], [place]  I HEREBY AUTHORIZE ANY PHYSICIAN, CHIROPRACTOR, SURGEON, PRACTITIONER OR ANY OTHER PERSON, ANY HOSPITAL, INCLUDING [facility] OR [facility], ANY  MEDICAL SERVICE ORGANIZATION, ANY INSURANCE COMPANY, OR OTHER INSTITUTION OR ORGANIZATION TO RELEASE TO EACH OTHER, ANY MEDICAL OR OTHER INFORMATION, INCLUDING BENEFITS PAID OR PAYABLE, PERTINENT TO THIS INJURY OR DISEASE, EXCEPT INFORMATION RELATIVE TO DIAGNOSIS, TREATMENT AND/OR COUNSELING FOR AIDS, PSYCHOLOGICAL CONDITIONS, ALCOHOL OR CONTROLLED SUBSTANCES, FOR WHICH I MUST GIVE SPECIFIC AUTHORIZATION.  A PHOTOSTAT OF THIS AUTHORIZATION SHALL BE VALID AS THE ORIGINAL.     Date   Place Employee’s Original or  *Electronic Signature   THIS REPORT MUST BE COMPLETED AND MAILED WITHIN 3 WORKING DAYS OF TREATMENT   Place  [facility], EMERGENCY DEPT    Name of Facility      Date 11/14/2024 Diagnosis and Description of Injury or Occupational Disease  No diagnosis found.  There were no encounter diagnoses. Is there evidence that the injured employee was under the influence of alcohol and/or another controlled substance at the time of accident?  []No  [] Yes (if yes, please explain)   Hour       Treatment:      Have you advised the patient to remain off work five days or more?   [] Yes Indicate dates: From   To    [] No      If no, is the injured employee capable of: [] full duty [] modified duty                     If modified duty, specify any limitations / restrictions:                                                                                                                                                                                                                                                                                                                                                                                                                   X-Ray Findings:      From information given by the employee, together with medical evidence, can you directly connect this injury or occupational disease as job incurred?  []Yes   [] No      Is additional medical care by a physician indicated? []Yes [] No       Do you know of any previous injury or disease contributing to this condition or occupational disease? []Yes [] No (Explain if yes)                              Date  11/14/2024 Print Health Care Provider’s Name  No name on file I certify that the employer’s copy of  this form was delivered to the employer on:   Address    INSURER'S USE ONLY                       City    State    Zip    Provider’s Tax ID Number      Telephone  Dept: [phone]    Health Care Provider’s Original or Electronic Signature    Degree (MD,DO, DC,PA-C,APRN)  {Provider Degrees:09709}  Choose (if applicable)      ORIGINAL - TREATING HEALTHCARE PROVIDER PAGE 2 - INSURER/TPA PAGE 3 - EMPLOYER PAGE 4 - EMPLOYEE             Form C-4 (rev.08/23)
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
## 2024-11-14 NOTE — LETTER
"    EMPLOYEE’S CLAIM FOR COMPENSATION/ REPORT OF INITIAL TREATMENT  FORM C-4  PLEASE TYPE OR PRINT    EMPLOYEE’S CLAIM - PROVIDE ALL INFORMATION REQUESTED   First Name                    JHOANA Holman                  Last Name  White Birthdate                    1980                Sex  Male Claim Number (Insurer’s Use Only)     Home Address  [address] Age  44 y.o. Height  1.803 m (5' 11\") Weight  95.3 kg (210 lb) Social Security Number  [id number]   [facility] Zip  [address] Telephone  There are no phone numbers on file.   Mailing Address  [address] Zip  [address] Primary Language Spoken  English    INSURER  *** THIRD-PARTY   [facility]   Employee's Occupation (Job Title) When Injury or Occupational Disease Occurred      Employer's Name/Company Name  [employer]  Telephone  [phone]    Office Mail Address (Number and Street)  [address]     Date of Injury (if applicable)                Hours Injury (if applicable)   Date Employer Notified   Last Day of Work after Injury or Occupational Disease   Supervisor to Whom Injury Reported     Address or Location of Accident (if applicable)     What were you doing at the time of accident? (if applicable)      How did this injury or occupational disease occur? (Be specific and answer in detail. Use additional sheet if necessary)     If you believe that you have an occupational disease, when did you first have knowledge of the disability and its relationship to your employment?   Witnesses to the Accident (if applicable)        Nature of Injury or Occupational Disease    Part(s) of Body Injured or Affected        I CERTIFY THAT THE ABOVE IS TRUE AND CORRECT TO T HE BEST OF MY KNOWLEDGE AND THAT I HAVE PROVIDED THIS INFORMATION IN ORDER TO OBTAIN THE BENEFITS OF [place], [place]  I HEREBY AUTHORIZE ANY PHYSICIAN, CHIROPRACTOR, SURGEON, PRACTITIONER OR ANY OTHER PERSON, ANY HOSPITAL, INCLUDING [facility] OR [facility], ANY  MEDICAL SERVICE ORGANIZATION, ANY INSURANCE COMPANY, OR OTHER INSTITUTION OR ORGANIZATION TO RELEASE TO EACH OTHER, ANY MEDICAL OR OTHER INFORMATION, INCLUDING BENEFITS PAID OR PAYABLE, PERTINENT TO THIS INJURY OR DISEASE, EXCEPT INFORMATION RELATIVE TO DIAGNOSIS, TREATMENT AND/OR COUNSELING FOR AIDS, PSYCHOLOGICAL CONDITIONS, ALCOHOL OR CONTROLLED SUBSTANCES, FOR WHICH I MUST GIVE SPECIFIC AUTHORIZATION.  A PHOTOSTAT OF THIS AUTHORIZATION SHALL BE VALID AS THE ORIGINAL.     Date   Place Employee’s Original or  *Electronic Signature   THIS REPORT MUST BE COMPLETED AND MAILED WITHIN 3 WORKING DAYS OF TREATMENT   Place  [facility], EMERGENCY DEPT    Name of Facility      Date 11/14/2024 Diagnosis and Description of Injury or Occupational Disease  No diagnosis found.  There were no encounter diagnoses. Is there evidence that the injured employee was under the influence of alcohol and/or another controlled substance at the time of accident?  []No  [] Yes (if yes, please explain)   Hour       Treatment:      Have you advised the patient to remain off work five days or more?   [] Yes Indicate dates: From   To    [] No      If no, is the injured employee capable of: [] full duty [] modified duty                     If modified duty, specify any limitations / restrictions:                                                                                                                                                                                                                                                                                                                                                                                                                   X-Ray Findings:      From information given by the employee, together with medical evidence, can you directly connect this injury or occupational disease as job incurred?  []Yes   [] No      Is additional medical care by a physician indicated? []Yes [] No       Do you know of any previous injury or disease contributing to this condition or occupational disease? []Yes [] No (Explain if yes)                              Date  11/14/2024 Print Health Care Provider’s Name  No name on file I certify that the employer’s copy of  this form was delivered to the employer on:   Address    INSURER'S USE ONLY                       City    State    Zip    Provider’s Tax ID Number      Telephone  Dept: [phone]    Health Care Provider’s Original or Electronic Signature    Degree (MD,DO, DC,PA-C,APRN)  {Provider Degrees:09890}  Choose (if applicable)      ORIGINAL - TREATING HEALTHCARE PROVIDER PAGE 2 - INSURER/TPA PAGE 3 - EMPLOYER PAGE 4 - EMPLOYEE             Form C-4 (rev.08/23)
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
## 2024-11-15 NOTE — ED NOTES
Bedside report received from off going RN/tech: anselmo, assumed care of patient.  POC discussed with patient. Call light within reach, all needs addressed at this time.       Fall risk interventions in place: Not Applicable (all applicable per [facility] Fall risk assessment)   Continuous monitoring: Not Applicable   IVF/IV medications: Not Applicable   Oxygen: Room Air  Bedside sitter: Not Applicable   Isolation: Not Applicable

## 2024-11-15 NOTE — ED NOTES
Discharge papers provided to pt, erp to notify pt if any medications are necessary. Pt/visitor states they typically stay until both results come back from exposure panel to leave.   RN notified erp. Erp stated they can stay until results are final. Pt requesting to stay.   Pt pending d/c.
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
## 2025-02-06 ENCOUNTER — OFFICE VISIT (OUTPATIENT)
Dept: URGENT CARE | Facility: PHYSICIAN GROUP | Age: 45
End: 2025-02-06
Payer: COMMERCIAL

## 2025-02-06 ENCOUNTER — APPOINTMENT (OUTPATIENT)
Dept: RADIOLOGY | Facility: IMAGING CENTER | Age: 45
End: 2025-02-06
Attending: STUDENT IN AN ORGANIZED HEALTH CARE EDUCATION/TRAINING PROGRAM
Payer: COMMERCIAL

## 2025-02-06 VITALS
HEIGHT: 71 IN | HEART RATE: 85 BPM | BODY MASS INDEX: 32.48 KG/M2 | WEIGHT: 232 LBS | RESPIRATION RATE: 18 BRPM | SYSTOLIC BLOOD PRESSURE: 126 MMHG | TEMPERATURE: 97.7 F | OXYGEN SATURATION: 94 % | DIASTOLIC BLOOD PRESSURE: 84 MMHG

## 2025-02-06 DIAGNOSIS — J45.901 EXACERBATION OF ASTHMA, UNSPECIFIED ASTHMA SEVERITY, UNSPECIFIED WHETHER PERSISTENT: ICD-10-CM

## 2025-02-06 DIAGNOSIS — J22 LRTI (LOWER RESPIRATORY TRACT INFECTION): ICD-10-CM

## 2025-02-06 DIAGNOSIS — R68.89 FLU-LIKE SYMPTOMS: ICD-10-CM

## 2025-02-06 PROCEDURE — 94640 AIRWAY INHALATION TREATMENT: CPT | Performed by: STUDENT IN AN ORGANIZED HEALTH CARE EDUCATION/TRAINING PROGRAM

## 2025-02-06 PROCEDURE — 99214 OFFICE O/P EST MOD 30 MIN: CPT | Mod: 25 | Performed by: STUDENT IN AN ORGANIZED HEALTH CARE EDUCATION/TRAINING PROGRAM

## 2025-02-06 PROCEDURE — 3079F DIAST BP 80-89 MM HG: CPT | Performed by: STUDENT IN AN ORGANIZED HEALTH CARE EDUCATION/TRAINING PROGRAM

## 2025-02-06 PROCEDURE — 71046 X-RAY EXAM CHEST 2 VIEWS: CPT | Mod: TC | Performed by: RADIOLOGY

## 2025-02-06 PROCEDURE — 3074F SYST BP LT 130 MM HG: CPT | Performed by: STUDENT IN AN ORGANIZED HEALTH CARE EDUCATION/TRAINING PROGRAM

## 2025-02-06 RX ORDER — AMOXICILLIN 500 MG/1
1000 CAPSULE ORAL 3 TIMES DAILY
Qty: 30 CAPSULE | Refills: 0 | Status: SHIPPED | OUTPATIENT
Start: 2025-02-06 | End: 2025-02-11

## 2025-02-06 RX ORDER — IPRATROPIUM BROMIDE AND ALBUTEROL SULFATE 2.5; .5 MG/3ML; MG/3ML
3 SOLUTION RESPIRATORY (INHALATION) ONCE
Status: COMPLETED | OUTPATIENT
Start: 2025-02-06 | End: 2025-02-06

## 2025-02-06 RX ORDER — BENZONATATE 100 MG/1
200 CAPSULE ORAL 3 TIMES DAILY PRN
Qty: 60 CAPSULE | Refills: 0 | Status: SHIPPED | OUTPATIENT
Start: 2025-02-06

## 2025-02-06 RX ORDER — PREDNISONE 20 MG/1
40 TABLET ORAL DAILY
Qty: 10 TABLET | Refills: 0 | Status: SHIPPED | OUTPATIENT
Start: 2025-02-06 | End: 2025-02-11

## 2025-02-06 RX ORDER — PREDNISONE 20 MG/1
40 TABLET ORAL DAILY
Qty: 10 TABLET | Refills: 0 | Status: SHIPPED | OUTPATIENT
Start: 2025-02-06 | End: 2025-02-06

## 2025-02-06 RX ORDER — BENZONATATE 100 MG/1
200 CAPSULE ORAL 3 TIMES DAILY PRN
Qty: 60 CAPSULE | Refills: 0 | Status: SHIPPED | OUTPATIENT
Start: 2025-02-06 | End: 2025-02-06

## 2025-02-06 RX ORDER — AMOXICILLIN 500 MG/1
1000 CAPSULE ORAL 3 TIMES DAILY
Qty: 30 CAPSULE | Refills: 0 | Status: SHIPPED | OUTPATIENT
Start: 2025-02-06 | End: 2025-02-06

## 2025-02-06 RX ADMIN — IPRATROPIUM BROMIDE AND ALBUTEROL SULFATE 3 ML: 2.5; .5 SOLUTION RESPIRATORY (INHALATION) at 14:17

## 2025-02-06 ASSESSMENT — FIBROSIS 4 INDEX: FIB4 SCORE: 1.98

## 2025-02-06 NOTE — PROGRESS NOTES
Subjective:   River Olson is a 44 y.o. male who presents for Cough (Dizziness, headache, body chills hot and cold, fevers x 3 days )      HPI:  44-year-old male presents to the urgent care for approximately 4 days of fever, chills, body aches, nasal congestion, runny nose, and cough.  Also has had some sensation of shortness of breath/wheezing.  Does have a history of asthma which is generally well-controlled.  No chest pain at rest.  Does have some sore throat with coughing but no sore throat at baseline.  No nausea, vomiting, diarrhea.    Medications:    benzonatate Caps  cyclobenzaprine Tabs  ipratropium-albuterol  predniSONE Tabs    Allergies: Patient has no allergy information on record.    Problem List: River Olson does not have any pertinent problems on file.    Surgical History:  Past Surgical History:   Procedure Laterality Date    LOW ANTERIOR RESECTION ROBOTIC XI  7/26/2023    Procedure: RESECTION, LOW ANTERIOR, ROBOT-ASSISTED, LAPAROSCOPIC, USING DA GAVIN XI;  Surgeon: Cipriano Arteaga M.D.;  Location: SURGERY [facility];  Service: Gen Robotic    OTHER  greater than 10 years    gall bladder removed    OTHER ABDOMINAL SURGERY      hernia repair as child       Past Social Hx: River Olson  reports that he quit smoking about 7 years ago. His smoking use included cigarettes. He started smoking about 24 years ago. He has a 4.3 pack-year smoking history. He has quit using smokeless tobacco.  His smokeless tobacco use included chew. He reports current alcohol use. He reports that he does not use drugs.     Past Family Hx:  River Olson family history includes Alcohol abuse in his mother; Diabetes in his maternal grandfather; Heart Disease in his maternal grandfather.     Problem list, medications, and allergies reviewed by myself today in Epic.     Objective:     /84 (BP Location: Right arm, Patient Position: Sitting, BP Cuff Size: Adult)   Pulse 85   Temp 36.5 °C  "(97.7 °F) (Temporal)   Resp 18   Ht 1.803 m (5' 11\")   Wt 105 kg (232 lb)   SpO2 94%   BMI 32.36 kg/m²     Physical Exam  Vitals reviewed.   Constitutional:       Appearance: He is not toxic-appearing.   HENT:      Right Ear: Tympanic membrane, ear canal and external ear normal.      Left Ear: Tympanic membrane, ear canal and external ear normal.      Nose: Congestion and rhinorrhea present.      Mouth/Throat:      Mouth: Mucous membranes are moist.      Pharynx: Posterior oropharyngeal erythema present.   Cardiovascular:      Rate and Rhythm: Normal rate and regular rhythm.      Pulses: Normal pulses.      Heart sounds: Normal heart sounds. No murmur heard.  Pulmonary:      Effort: Pulmonary effort is normal. No respiratory distress.      Breath sounds: No stridor. Wheezing present. No rhonchi or rales.         Assessment/Plan:     Diagnosis and associated orders:     1. Flu-like symptoms  DX-CHEST-2 VIEWS    benzonatate (TESSALON) 100 MG Cap    DISCONTINUED: benzonatate (TESSALON) 100 MG Cap      2. Exacerbation of asthma, unspecified asthma severity, unspecified whether persistent  ipratropium-albuterol (DUONEB) nebulizer solution    predniSONE (DELTASONE) 20 MG Tab    DISCONTINUED: predniSONE (DELTASONE) 20 MG Tab      3. LRTI (lower respiratory tract infection)  amoxicillin (AMOXIL) 500 MG Cap    DISCONTINUED: amoxicillin (AMOXIL) 500 MG Cap         Comments/MDM:     Patient's presentation physical exam findings are consistent with asthma exacerbation secondary to upper respiratory tract infection.  Initially had wheezing in all lung fields.  DuoNeb was administered and wheezing did resolve but did have rhonchi present to the left upper lung field.  I do of concern for lower respiratory tract infection.  Chest x-ray was performed showing no obvious acute cardiopulmonary disease.  Amoxicillin prescribed.  Discussed typical timeframe for improvement in symptoms.  Vitals are stable.  Return precautions " given.         Differential diagnosis, natural history, supportive care, and indications for immediate follow-up discussed.    Advised the patient to follow-up with the primary care physician for recheck, reevaluation, and consideration of further management.    Please note that this dictation was created using voice recognition software. I have made a reasonable attempt to correct obvious errors, but I expect that there are errors of grammar and possibly content that I did not discover before finalizing the note.    Electronically signed by John Shell PA-C.

## (undated) DEVICE — SYSTEM CLEARIFY VISUALIZATION (10EA/PK)

## (undated) DEVICE — SODIUM CHL IRRIGATION 0.9% 1000ML (12EA/CA)

## (undated) DEVICE — LACTATED RINGERS INJ 1000 ML - (14EA/CA 60CA/PF)

## (undated) DEVICE — Device

## (undated) DEVICE — COVER LIGHT HANDLE ALC PLUS DISP (18EA/BX)

## (undated) DEVICE — SPONGE GAUZESTER 4 X 4 4PLY - (128PK/CA)

## (undated) DEVICE — CANISTER SUCTION 3000ML MECHANICAL FILTER AUTO SHUTOFF MEDI-VAC NONSTERILE LF DISP  (40EA/CA)

## (undated) DEVICE — DERMABOND ADVANCED - (12EA/BX)

## (undated) DEVICE — PAD LAP STERILE 18 X 18 - (5/PK 40PK/CA)

## (undated) DEVICE — RELOAD STAPLER SUREFORM 60 GREEN (12EA/BX)

## (undated) DEVICE — DRAPE UNDER BUTTOCK LRG (40/CA)

## (undated) DEVICE — SHEARS MONOPOLAR CURVED  DA VINCI 10X'S REUSABLE

## (undated) DEVICE — GOWN WARMING STANDARD FLEX - (30/CA)

## (undated) DEVICE — STAPLER SKIN DISP - (6/BX 10BX/CA) VISISTAT

## (undated) DEVICE — REDUCER XI STAPLER 12MM TO 8MM (6EA/BX)

## (undated) DEVICE — BIPOLAR FORCE DA VINCI 12X'S REISABLE

## (undated) DEVICE — OBTURATOR BLADELESS STANDARD 8MM (6EA/BX)

## (undated) DEVICE — SET LEADWIRE 5 LEAD BEDSIDE DISPOSABLE ECG (1SET OF 5/EA)

## (undated) DEVICE — SUTURE 4-0 MONOCRYL PLUSPC-3 - 18 INCH (12/BX)

## (undated) DEVICE — ROBOTIC SURGERY SERVICES

## (undated) DEVICE — SENSOR OXIMETER ADULT SPO2 RD SET (20EA/BX)

## (undated) DEVICE — BOWL

## (undated) DEVICE — SUTURE 2-0 PROLENE SH (36PK/BX)

## (undated) DEVICE — GLOVE BIOGEL PI INDICATOR SZ 6.5 SURGICAL PF LF - (50/BX 4BX/CA)

## (undated) DEVICE — SUCTION INSTRUMENT YANKAUER BULBOUS TIP W/O VENT (50EA/CA)

## (undated) DEVICE — SUTURE 3-0 VICRYL PLUS SH - 8X 18 INCH (12/BX)

## (undated) DEVICE — ELECTRODE DUAL RETURN W/ CORD - (50/PK)

## (undated) DEVICE — TUBING CLEARLINK DUO-VENT - C-FLO (48EA/CA)

## (undated) DEVICE — SET EXTENSION WITH 2 PORTS (48EA/CA) ***PART #2C8610 IS A SUBSTITUTE*****

## (undated) DEVICE — GLOVE BIOGEL SZ 6.5 SURGICAL PF LTX (50PR/BX 4BX/CA)

## (undated) DEVICE — SLEEVE VASO CALF MED - (10PR/CA)

## (undated) DEVICE — TRAY CATHETER FOLEY URINE METER W/STATLOCK 350ML (10EA/CA)

## (undated) DEVICE — TOWELS CLOTH SURGICAL - (4/PK 20PK/CA)

## (undated) DEVICE — SET SUCTION/IRRIGATION WITH DISPOSABLE TIP (6/CA )PART #0250-070-520 IS A SUB

## (undated) DEVICE — STAPLER SUREFORM 60 12 MM (6EA/BX)

## (undated) DEVICE — SUTURE GENERAL

## (undated) DEVICE — GRASPER SMALL DA VINCI 10X'S REUSABLE

## (undated) DEVICE — COVER TIP ENDOWRIST HOT SHEAR - (10EA/BX) DA VINCI

## (undated) DEVICE — DRAPE MAYO STAND - (30/CA)

## (undated) DEVICE — TROCAR 5X100 NON BLADED Z-TH - READ KII (6/BX)

## (undated) DEVICE — SUTURE 2-0 SILK SH (36PK/BX)

## (undated) DEVICE — NEEDLE INSFL 120MM 14GA VRRS - (20/BX)

## (undated) DEVICE — GLOVE SZ 6.5 BIOGEL PI MICRO - PF LF (50PR/BX)

## (undated) DEVICE — GOWN SURGEONS X-LARGE - DISP. (30/CA)

## (undated) DEVICE — SEALER VESSEL EXTEND FROM DAVINCI ENERGY (6EA/BX)

## (undated) DEVICE — SYRINGE ASEPTO - (50EA/CA

## (undated) DEVICE — STAPLER CIRCULAR POWERED 29MM ECHELON (3EA/BX)

## (undated) DEVICE — SEAL CANNULA STAPLER 12 MM (10EA/BX)

## (undated) DEVICE — GLOVE BIOGEL SZ 8 SURGICAL PF LTX - (50PR/BX 4BX/CA)

## (undated) DEVICE — SEAL 5MM-8MM UNIVERSAL  BOX OF 10

## (undated) DEVICE — DRAPE COLUMN  BOX OF 20

## (undated) DEVICE — PAD OR TABLE DA VINCI 2IN X 20IN X 72IN - (12EA/CA)

## (undated) DEVICE — SUTURE 0 COATED VICRYL 6-18IN - (12PK/BX)

## (undated) DEVICE — SUTURE 1 VICRYL PLUS CTX - 8 X 18 INCH (12/BX)

## (undated) DEVICE — LEGGING LITHOTOMY 31 X 48 IN - (2EA/PK 20PK/CA)

## (undated) DEVICE — SIGMOIDOSCOPE DISP. - (25/BX)

## (undated) DEVICE — DRAPE ARM  BOX OF 20